# Patient Record
Sex: MALE | Race: WHITE | NOT HISPANIC OR LATINO | Employment: FULL TIME | URBAN - METROPOLITAN AREA
[De-identification: names, ages, dates, MRNs, and addresses within clinical notes are randomized per-mention and may not be internally consistent; named-entity substitution may affect disease eponyms.]

---

## 2020-08-19 ENCOUNTER — TELEPHONE (OUTPATIENT)
Dept: FAMILY MEDICINE CLINIC | Facility: CLINIC | Age: 55
End: 2020-08-19

## 2020-08-19 ENCOUNTER — OFFICE VISIT (OUTPATIENT)
Dept: FAMILY MEDICINE CLINIC | Facility: CLINIC | Age: 55
End: 2020-08-19

## 2020-08-19 VITALS
BODY MASS INDEX: 36.47 KG/M2 | TEMPERATURE: 97.3 F | OXYGEN SATURATION: 99 % | HEIGHT: 73 IN | DIASTOLIC BLOOD PRESSURE: 88 MMHG | HEART RATE: 67 BPM | WEIGHT: 275.2 LBS | SYSTOLIC BLOOD PRESSURE: 140 MMHG

## 2020-08-19 DIAGNOSIS — R73.01 IMPAIRED FASTING BLOOD SUGAR: ICD-10-CM

## 2020-08-19 DIAGNOSIS — R97.20 HIGH PROSTATE SPECIFIC ANTIGEN (PSA): ICD-10-CM

## 2020-08-19 DIAGNOSIS — Z12.11 ENCOUNTER FOR SCREENING COLONOSCOPY: ICD-10-CM

## 2020-08-19 DIAGNOSIS — E78.00 HIGH CHOLESTEROL: ICD-10-CM

## 2020-08-19 DIAGNOSIS — Z00.00 ANNUAL PHYSICAL EXAM: Primary | ICD-10-CM

## 2020-08-19 PROBLEM — J30.2 ALLERGIC RHINITIS, SEASONAL: Status: ACTIVE | Noted: 2020-08-19

## 2020-08-19 PROBLEM — E66.9 ADIPOSITY: Status: ACTIVE | Noted: 2020-08-19

## 2020-08-19 PROCEDURE — 99386 PREV VISIT NEW AGE 40-64: CPT | Performed by: FAMILY MEDICINE

## 2020-08-19 RX ORDER — LOSARTAN POTASSIUM AND HYDROCHLOROTHIAZIDE 12.5; 5 MG/1; MG/1
1 TABLET ORAL DAILY
COMMUNITY
End: 2021-01-25 | Stop reason: SDUPTHER

## 2020-08-19 NOTE — PROGRESS NOTES
Chief Complaint   Patient presents with   • Establish Care     needes referrals       Subjective     Wei Salgado is a 55 y.o. male and is here for a yearly physical exam. The patient reports no problems.    Do you take any herbs or supplements that were not prescribed by a doctor? no. If so, these will be added to active medication list.    55-year-old male here for annual exam and to establish care    Past medical history of hypertension.  He takes losartan unknown dose, he has been taking this medication for about a year.  He started with lisinopril but that gave him headaches.  He is former PCP is Dr. Alba Manriquez MD.      Labs from Jan 2019 last year:  -  -  -Fasting glucose 118 A1c 5.6  -LFTs normal  -Thyroid was normal   -Hgb normal   -PSA 0.7  -Low vit D  -Normal renal function    He was told to get started on cholesterol medication he declined.  He is a non-smoker.  His BMI is 36.  He is on medication for hypertension.    Health Maintenance:  No FH colon cancer.  Non-smoker.  No family history of any other cancers.  Has been told about sleep apnea in the past  Never had c-scope  He is on [facility] medicaid until end month/may get renewed we will see when we can schedule colonoscopy so that it is covered        Past Medical History:   Diagnosis Date   • Hyperlipidemia    • Hypertension    • Sleep apnea        Past Surgical History:   Procedure Laterality Date   • HAND SURGERY  1977       Family History   Problem Relation Age of Onset   • Arthritis Mother    • Mental illness Mother    • Stroke Father    • No Known Problems Sister    • No Known Problems Brother    • No Known Problems Brother    • No Known Problems Brother        Social History     Socioeconomic History   • Marital status:      Spouse name: Not on file   • Number of children: Not on file   • Years of education: Not on file   • Highest education level: Not on file   Tobacco Use   • Smoking status: Never Smoker   • Smokeless  "tobacco: Never Used   Substance and Sexual Activity   • Alcohol use: Defer   • Drug use: Defer       Current Outpatient Medications on File Prior to Visit   Medication Sig Dispense Refill   • LOSARTAN POTASSIUM PO Take  by mouth.       No current facility-administered medications on file prior to visit.        Review of Systems   Constitutional: Negative for activity change, chills and fever.   HENT: Negative for congestion, postnasal drip and rhinorrhea.    Eyes: Negative for blurred vision and pain.   Respiratory: Negative for cough, chest tightness and shortness of breath.    Cardiovascular: Negative for chest pain.   Gastrointestinal: Negative for abdominal pain, constipation, diarrhea, nausea and vomiting.   Endocrine: Negative for cold intolerance and heat intolerance.   Genitourinary: Negative for decreased urine volume, dysuria and frequency.   Musculoskeletal: Negative for arthralgias, back pain and myalgias.   Skin: Negative for rash and skin lesions.   Neurological: Negative for dizziness and confusion.   Psychiatric/Behavioral: Negative for agitation, behavioral problems and depressed mood.         Vitals:    08/19/20 0830   BP: 140/88   Pulse: 67   Temp: 97.3 °F (36.3 °C)   SpO2: 99%   Weight: 125 kg (275 lb 3.2 oz)   Height: 185.4 cm (73\")       General Appearance:  Alert, cooperative, no distress, appears stated age   Head:  Normocephalic, without obvious abnormality, atraumatic   Eyes:  PERRL, conjunctiva/corneas clear, EOM's intact.   Ears:  Normal external ear canals, both ears   Nose: Nares normal   Throat: Lips, mucosa, and tongue normal; teeth and gums normal   Neck: Supple, symmetrical, trachea midline   Back:  Symmetric   Lungs:  Clear to auscultation bilaterally, respirations unlabored   Chest wall:  No tenderness or deformity   Heart:  Regular rate and rhythm, S1 and S2 normal   Abdomen:  Soft, non-tender, bowel sounds active   Rectal:        Extremities: Extremities normal   Pulses: 2+ and " symmetric all extremities   Skin: Skin normal   Lymph nodes: Cervical nodes normal   Neurologic:  Normal strength, sensation and reflexes   throughout        No results found for this or any previous visit.  Assessment/Plan   Healthy male exam.    There are no diagnoses linked to this encounter.  1. Annual Exam     2. Patient Counseling:  --Nutrition: Stressed importance of moderation in sodium/caffeine intake, saturated fat and cholesterol.  Discussed caloric balance, sufficient intake of fresh fruits, vegetables, fiber, calcium, iron.  --Discussed the new recommendation against daily use of baby aspirin for primary prevention in low risk patients.  --Exercise: Stressed the importance of regular exercise.   --Substance Abuse: Discussed cessation/primary prevention of tobacco, alcohol, or other drug use; driving or other dangerous activities under the influence.    --Dental health: Discussed importance of regular tooth brushing, flossing, and dental visits.  -- suggested having eyes and vision checked if needed or past due.  --Immunizations reviewed.  --Discussed benefits of screening colonoscopy.    3. Discussed the patient's BMI with him.  The BMI is above average; BMI management plan is completed  4. Follow up as needed for acute illness       -Schedule for colonoscopy depending on his insurance coverage  -Make sure he comes back for fasting labs before the end of the month per insurance  -He is to call us and let us know what dosage of losartan he takes daily.  He does not know.  He takes it once a day in the mornings.  Blood pressure was borderline 140/80 today.  Asymptomatic      Shayna Messina MD  [facility]

## 2020-08-24 ENCOUNTER — RESULTS ENCOUNTER (OUTPATIENT)
Dept: FAMILY MEDICINE CLINIC | Facility: CLINIC | Age: 55
End: 2020-08-24

## 2020-08-24 DIAGNOSIS — R73.01 IMPAIRED FASTING BLOOD SUGAR: ICD-10-CM

## 2020-08-24 DIAGNOSIS — E78.00 HIGH CHOLESTEROL: ICD-10-CM

## 2020-08-24 DIAGNOSIS — R97.20 HIGH PROSTATE SPECIFIC ANTIGEN (PSA): ICD-10-CM

## 2020-08-24 DIAGNOSIS — Z00.00 ANNUAL PHYSICAL EXAM: ICD-10-CM

## 2020-09-14 ENCOUNTER — PREP FOR SURGERY (OUTPATIENT)
Dept: OTHER | Facility: HOSPITAL | Age: 55
End: 2020-09-14

## 2020-09-14 DIAGNOSIS — Z12.11 SCREEN FOR COLON CANCER: Primary | ICD-10-CM

## 2020-10-19 PROBLEM — Z12.11 SCREEN FOR COLON CANCER: Status: ACTIVE | Noted: 2020-10-19

## 2020-11-05 ENCOUNTER — TRANSCRIBE ORDERS (OUTPATIENT)
Dept: SLEEP MEDICINE | Facility: HOSPITAL | Age: 55
End: 2020-11-05

## 2020-11-05 DIAGNOSIS — Z01.818 OTHER SPECIFIED PRE-OPERATIVE EXAMINATION: Primary | ICD-10-CM

## 2020-11-18 ENCOUNTER — LAB (OUTPATIENT)
Dept: LAB | Facility: HOSPITAL | Age: 55
End: 2020-11-18

## 2020-11-18 DIAGNOSIS — Z01.818 OTHER SPECIFIED PRE-OPERATIVE EXAMINATION: ICD-10-CM

## 2020-11-18 PROCEDURE — C9803 HOPD COVID-19 SPEC COLLECT: HCPCS

## 2020-11-18 PROCEDURE — U0004 COV-19 TEST NON-CDC HGH THRU: HCPCS

## 2020-11-19 LAB — SARS-COV-2 RNA RESP QL NAA+PROBE: NOT DETECTED

## 2020-11-19 RX ORDER — FLUTICASONE PROPIONATE 50 MCG
1 SPRAY, SUSPENSION (ML) NASAL DAILY
COMMUNITY
End: 2021-01-25 | Stop reason: SDUPTHER

## 2020-11-20 ENCOUNTER — HOSPITAL ENCOUNTER (OUTPATIENT)
Facility: HOSPITAL | Age: 55
Setting detail: HOSPITAL OUTPATIENT SURGERY
Discharge: HOME | End: 2020-11-20
Attending: INTERNAL MEDICINE | Admitting: INTERNAL MEDICINE

## 2020-11-20 ENCOUNTER — ANESTHESIA (OUTPATIENT)
Dept: GASTROENTEROLOGY | Facility: HOSPITAL | Age: 55
End: 2020-11-20

## 2020-11-20 ENCOUNTER — ANESTHESIA EVENT (OUTPATIENT)
Dept: GASTROENTEROLOGY | Facility: HOSPITAL | Age: 55
End: 2020-11-20

## 2020-11-20 VITALS
DIASTOLIC BLOOD PRESSURE: 85 MMHG | TEMPERATURE: 98.4 F | HEART RATE: 65 BPM | SYSTOLIC BLOOD PRESSURE: 131 MMHG | OXYGEN SATURATION: 96 % | RESPIRATION RATE: 12 BRPM | BODY MASS INDEX: 36.18 KG/M2 | HEIGHT: 73 IN | WEIGHT: 273 LBS

## 2020-11-20 PROCEDURE — 25010000002 PROPOFOL 10 MG/ML EMULSION: Performed by: ANESTHESIOLOGY

## 2020-11-20 PROCEDURE — S0260 H&P FOR SURGERY: HCPCS | Performed by: INTERNAL MEDICINE

## 2020-11-20 PROCEDURE — G0121 COLON CA SCRN NOT HI RSK IND: HCPCS | Performed by: INTERNAL MEDICINE

## 2020-11-20 RX ORDER — LIDOCAINE HYDROCHLORIDE 20 MG/ML
INJECTION, SOLUTION INFILTRATION; PERINEURAL AS NEEDED
Status: DISCONTINUED | OUTPATIENT
Start: 2020-11-20 | End: 2020-11-20 | Stop reason: SURG

## 2020-11-20 RX ORDER — SODIUM CHLORIDE, SODIUM LACTATE, POTASSIUM CHLORIDE, CALCIUM CHLORIDE 600; 310; 30; 20 MG/100ML; MG/100ML; MG/100ML; MG/100ML
1000 INJECTION, SOLUTION INTRAVENOUS CONTINUOUS
Status: DISCONTINUED | OUTPATIENT
Start: 2020-11-20 | End: 2020-11-20 | Stop reason: HOSPADM

## 2020-11-20 RX ORDER — PROPOFOL 10 MG/ML
VIAL (ML) INTRAVENOUS AS NEEDED
Status: DISCONTINUED | OUTPATIENT
Start: 2020-11-20 | End: 2020-11-20 | Stop reason: SURG

## 2020-11-20 RX ORDER — PROPOFOL 10 MG/ML
VIAL (ML) INTRAVENOUS CONTINUOUS PRN
Status: DISCONTINUED | OUTPATIENT
Start: 2020-11-20 | End: 2020-11-20 | Stop reason: SURG

## 2020-11-20 RX ADMIN — SODIUM CHLORIDE, POTASSIUM CHLORIDE, SODIUM LACTATE AND CALCIUM CHLORIDE 1000 ML: 600; 310; 30; 20 INJECTION, SOLUTION INTRAVENOUS at 11:33

## 2020-11-20 RX ADMIN — LIDOCAINE HYDROCHLORIDE 60 MG: 20 INJECTION, SOLUTION INFILTRATION; PERINEURAL at 12:18

## 2020-11-20 RX ADMIN — PROPOFOL 140 MCG/KG/MIN: 10 INJECTION, EMULSION INTRAVENOUS at 12:18

## 2020-11-20 RX ADMIN — PROPOFOL 50 MG: 10 INJECTION, EMULSION INTRAVENOUS at 12:18

## 2020-11-20 NOTE — ANESTHESIA POSTPROCEDURE EVALUATION
"Patient: Wei Salgado    Procedure Summary     Date: 11/20/20 Room / Location:  [facility]    Anesthesia Start: 1215 Anesthesia Stop: 1248    Procedure: COLONOSCOPY INTO CECUM (N/A ) Diagnosis:       Screen for colon cancer      Internal hemorrhoids      (Screen for colon cancer [Z12.11])    Surgeon: Jesse Frey MD Provider: Toribio Horner MD    Anesthesia Type: MAC ASA Status: 3          Anesthesia Type: MAC    Vitals  Vitals Value Taken Time   /75 11/20/20 1249   Temp     Pulse 76 11/20/20 1249   Resp 12 11/20/20 1249   SpO2 95 % 11/20/20 1249           Post Anesthesia Care and Evaluation    Patient location during evaluation: bedside  Patient participation: complete - patient participated  Level of consciousness: awake  Pain score: 2  Pain management: adequate  Airway patency: patent  Anesthetic complications: No anesthetic complications  PONV Status: none  Cardiovascular status: acceptable  Respiratory status: acceptable  Hydration status: acceptable    Comments: /75   Pulse 76   Temp 36.9 °C (98.4 °F) (Oral)   Resp 12   Ht 185.4 cm (73\")   Wt 124 kg (273 lb)   SpO2 95%   BMI 36.02 kg/m²         "

## 2020-11-20 NOTE — BRIEF OP NOTE
COLONOSCOPY  Progress Note    Wei Salgado  11/20/2020    Pre-op Diagnosis:   Screen for colon cancer [Z12.11]       Post-Op Diagnosis Codes:     * Screen for colon cancer [Z12.11]     * Internal hemorrhoids [K64.8]    Procedure/CPT® Codes:        Procedure(s):  COLONOSCOPY INTO CECUM    Surgeon(s):  Jesse Frey MD    Anesthesia: Monitored Anesthesia Care    Staff:   Endo Technician: Rosalie Galeano Zyon  Endo Nurse: Elizabet Rojas RN         Estimated Blood Loss: none    Urine Voided: * No values recorded between 11/20/2020 12:15 PM and 11/20/2020 12:43 PM *    Specimens:                None          Drains: * No LDAs found *    Findings: Colonoscopy to the cecum and ileocecal valve with normal colonic mucosa throughout 1+ internal hemorrhoids were identified.    Complications: None          Jesse Frey MD     Date: 11/20/2020  Time: 12:44 EST

## 2020-11-20 NOTE — ANESTHESIA PREPROCEDURE EVALUATION
Anesthesia Evaluation     Patient summary reviewed and Nursing notes reviewed   NPO Solid Status: > 8 hours  NPO Liquid Status: > 2 hours           Airway   Mallampati: II  TM distance: >3 FB  Neck ROM: full  Dental - normal exam     Pulmonary - normal exam   (+) sleep apnea on CPAP,   Cardiovascular - normal exam    (+) hypertension 2 medications or greater, hyperlipidemia,       Neuro/Psych- negative ROS  GI/Hepatic/Renal/Endo    (+) obesity,       Musculoskeletal (-) negative ROS    Abdominal    Substance History - negative use     OB/GYN negative ob/gyn ROS         Other                        Anesthesia Plan    ASA 3     MAC       Anesthetic plan, all risks, benefits, and alternatives have been provided, discussed and informed consent has been obtained with: patient.

## 2020-11-20 NOTE — H&P
"[facility]  Pre Procedure History & Physical    Chief Complaint:   Colonoscopy screening    Subjective     HPI:   Patient 55-year-old male with history of hypertension hyperlipidemia presenting for screening.  Patient with no GI complaints here for colonoscopy.    Past Medical History:   Past Medical History:   Diagnosis Date   • Hyperlipidemia    • Hypertension    • Sleep apnea     USES CPAP        Past Surgical History:  Past Surgical History:   Procedure Laterality Date   • HAND SURGERY  1977   • WISDOM TOOTH EXTRACTION         Family History:  Family History   Problem Relation Age of Onset   • Arthritis Mother    • Mental illness Mother    • Stroke Father    • No Known Problems Sister    • No Known Problems Brother    • No Known Problems Brother    • No Known Problems Brother    • Malig Hyperthermia Neg Hx        Social History:   reports that he has never smoked. He has never used smokeless tobacco. He reports current alcohol use. He reports that he does not use drugs.    Medications:   Medications Prior to Admission   Medication Sig Dispense Refill Last Dose   • fluticasone (FLONASE) 50 MCG/ACT nasal spray 1 spray into the nostril(s) as directed by provider Daily.   11/19/2020 at Unknown time   • losartan-hydrochlorothiazide (HYZAAR) 50-12.5 MG per tablet Take 1 tablet by mouth Daily.   11/20/2020 at 0620       Allergies:  Patient has no known allergies.    ROS:    Pertinent items are noted in HPI     Objective     Blood pressure 141/90, pulse 70, temperature 98.4 °F (36.9 °C), temperature source Oral, resp. rate 13, height 185.4 cm (73\"), weight 124 kg (273 lb), SpO2 99 %.    Physical Exam   Constitutional: Pt is oriented to person, place, and time and well-developed, well-nourished, and in no distress.   Mouth/Throat: Oropharynx is clear and moist.   Neck: Normal range of motion.   Cardiovascular: Normal rate, regular rhythm and normal heart sounds.    Pulmonary/Chest: Effort normal " and breath sounds normal.   Abdominal: Soft. Nontender  Skin: Skin is warm and dry.   Psychiatric: Mood, memory, affect and judgment normal.     Assessment/Plan     Diagnosis:  Colonoscopy screening    Anticipated Surgical Procedure:  Colonoscopy    The risks, benefits, and alternatives of this procedure have been discussed with the patient or the responsible party- the patient understands and agrees to proceed.

## 2020-12-29 ENCOUNTER — TELEPHONE (OUTPATIENT)
Dept: FAMILY MEDICINE CLINIC | Facility: CLINIC | Age: 55
End: 2020-12-29

## 2020-12-29 NOTE — TELEPHONE ENCOUNTER
Provider: YOSVANY CONTRERAS  Caller: LOVE GARCIA   Relationship to Patient: SELF    Phone Number: [phone] (H)      Reason for Call: PATIENT CALLING STATING CALLED MEDICAID TO GET NEW CPAP MACHINE  MODEL (RESNED [id number]) WAS TOLD  NEEDS A PRESCRIPTION WRITTEN. PATIENT STATES JUST NEEDS HOES, HEADGEAR AND NASAL PILLOW        PATIENT IS REQUESTING A CALLBACK

## 2020-12-30 NOTE — TELEPHONE ENCOUNTER
Pt only needs the supplies, said his CPAP machine is fine. He only needs the hose, head strap and a new nasal pillow.

## 2020-12-31 NOTE — TELEPHONE ENCOUNTER
[facility] just supplies written on it, sorry thanks

## 2021-01-25 NOTE — TELEPHONE ENCOUNTER
Caller: Wei Salgado    Relationship: Self    Best call back number: [phone]       Medication needed:   Requested Prescriptions     Pending Prescriptions Disp Refills   • losartan-hydrochlorothiazide (HYZAAR) 50-12.5 MG per tablet        Sig: Take 1 tablet by mouth Daily.   • fluticasone (FLONASE) 50 MCG/ACT nasal spray        Si spray into the nostril(s) as directed by provider Daily.       When do you need the refill by: ASAP  What details did the patient provide when requesting the medication: PREVIOUS DR FILLED THESE, IS WANTING TO NOW SINCE HE ESTABLISHED WITH DR CONTRERAS IN  IF SHE CAN FILL THESE NOW    Does the patient have less than a 3 day supply:  [x] Yes  [] No    What is the patient's preferred pharmacy: [facility] [address] [place] [address] PKY - [phone] [facility] [phone] FX

## 2021-01-26 RX ORDER — FLUTICASONE PROPIONATE 50 MCG
1 SPRAY, SUSPENSION (ML) NASAL DAILY
Qty: 1 BOTTLE | Refills: 3 | Status: SHIPPED | OUTPATIENT
Start: 2021-01-26 | End: 2021-05-01

## 2021-01-26 RX ORDER — LOSARTAN POTASSIUM AND HYDROCHLOROTHIAZIDE 12.5; 5 MG/1; MG/1
1 TABLET ORAL DAILY
Qty: 30 TABLET | Refills: 3 | Status: SHIPPED | OUTPATIENT
Start: 2021-01-26 | End: 2021-05-01

## 2021-01-29 RX ORDER — LOSARTAN POTASSIUM 50 MG/1
50 TABLET ORAL DAILY
Qty: 30 TABLET | Refills: 1 | Status: SHIPPED | OUTPATIENT
Start: 2021-01-29 | End: 2021-03-30 | Stop reason: SDUPTHER

## 2021-01-29 RX ORDER — LOSARTAN POTASSIUM 50 MG/1
TABLET ORAL
COMMUNITY
Start: 2020-11-25 | End: 2021-01-29 | Stop reason: SDUPTHER

## 2021-01-29 NOTE — TELEPHONE ENCOUNTER
The message from the pt below states he only wants the losartan he does not need the HCTZ. I reconciled his medication and what is in his chart is what he was taking. The pending order is what he would like

## 2021-01-29 NOTE — TELEPHONE ENCOUNTER
Caller: Wei Salgado    Relationship: Self    Best call back number: [phone] (H)    Medication needed:   Requested Prescriptions      No prescriptions requested or ordered in this encounter   LOSARTAN 50MG 1 QD OR 25 2 QD    When do you need the refill by: ASAP    What details did the patient provide when requesting the medication: PATIENT IS REQUESTING THE LOSARTAN BY ITSELF, WAS PREVIOUSLY FILLED BY DR ANA APARICIO BUT HE HAS NOW SWITCHED TO DR CONTRERAS AND IS ASKING THAT SHE FILL IT WITHOUT THE HYDROCHLOROTHIAZIDE BECAUSE HE DOESN'T NEED THAT, PLEASE CALL PATIENT IF YOU HAVE QUESTIONS, PATIENT IS COMPLETELY OUT AND HAS BEEN FOR ALMOST A WEEK    Does the patient have less than a 3 day supply:  [x] Yes  [] No    What is the patient's preferred pharmacy: [facility] [address] [place] [address] PKY - [phone] [facility] [phone] FX

## 2021-01-29 NOTE — TELEPHONE ENCOUNTER
Can we check is he on this med or the combo losartan-hctz, can give refill but needs f/u at least q6mo

## 2021-02-01 ENCOUNTER — OFFICE VISIT (OUTPATIENT)
Dept: FAMILY MEDICINE CLINIC | Facility: CLINIC | Age: 56
End: 2021-02-01

## 2021-02-01 VITALS
WEIGHT: 265 LBS | HEART RATE: 76 BPM | BODY MASS INDEX: 35.12 KG/M2 | HEIGHT: 73 IN | SYSTOLIC BLOOD PRESSURE: 160 MMHG | OXYGEN SATURATION: 98 % | DIASTOLIC BLOOD PRESSURE: 90 MMHG

## 2021-02-01 DIAGNOSIS — J01.00 ACUTE MAXILLARY SINUSITIS, RECURRENCE NOT SPECIFIED: Primary | ICD-10-CM

## 2021-02-01 PROCEDURE — 99213 OFFICE O/P EST LOW 20 MIN: CPT | Performed by: PHYSICIAN ASSISTANT

## 2021-02-01 RX ORDER — AMOXICILLIN 500 MG/1
1000 CAPSULE ORAL 3 TIMES DAILY
Qty: 30 CAPSULE | Refills: 0 | Status: SHIPPED | OUTPATIENT
Start: 2021-02-01 | End: 2021-05-01

## 2021-02-01 RX ORDER — IBUPROFEN 200 MG
200 TABLET ORAL EVERY 6 HOURS PRN
COMMUNITY
End: 2021-05-01

## 2021-02-01 NOTE — PROGRESS NOTES
"Chief Complaint  Oral Pain (last dental visit 8/31/20)    Subjective          Wei Salgado presents to [facility] for   History of Present Illness    Gilberto is a 55 year old male who presents for fatigue and symptoms pain.  States he has had some teeth sensitivity.  Has had left ear pressure/pain off and on for the past few days.  Denied any fevers, chills, rhinorrhea, headache, or grinding of teeth.  The last saw his dentist 2020.  Has been taking over-the-counter ibuprofen which has helped.  Appetite and sleep have been normal.    Objective   Vital Signs:   /90   Pulse 76   Ht 185.4 cm (73\")   Wt 120 kg (265 lb)   SpO2 98%   BMI 34.96 kg/m²     Physical Exam  Vitals signs and nursing note reviewed.   Constitutional:       Appearance: Normal appearance. He is well-developed and well-groomed. He is obese.      Interventions: Face mask in place.   HENT:      Head: Normocephalic and atraumatic.      Jaw: There is normal jaw occlusion.      Right Ear: Hearing, tympanic membrane, ear canal and external ear normal.      Left Ear: Hearing, tympanic membrane, ear canal and external ear normal.      Nose:      Right Sinus: Maxillary sinus tenderness present.      Left Sinus: Maxillary sinus tenderness present.      Mouth/Throat:      Lips: Pink.      Mouth: Mucous membranes are moist.      Dentition: Normal dentition. No dental tenderness, dental caries or gum lesions.      Tongue: No lesions.      Palate: No mass.      Pharynx: Oropharynx is clear. Uvula midline.   Eyes:      General: Lids are normal.      Conjunctiva/sclera: Conjunctivae normal.   Neck:      Musculoskeletal: Neck supple.      Trachea: Trachea and phonation normal. No tracheal tenderness.   Cardiovascular:      Rate and Rhythm: Normal rate and regular rhythm.      Pulses: Normal pulses.      Heart sounds: Normal heart sounds, S1 normal and S2 normal. No murmur.   Pulmonary:      Effort: Pulmonary effort is normal. "      Breath sounds: Normal breath sounds and air entry.   Abdominal:      General: Bowel sounds are normal.      Palpations: Abdomen is soft. There is no hepatomegaly.      Tenderness: There is no abdominal tenderness.   Musculoskeletal:      Right lower leg: No edema.      Left lower leg: No edema.   Lymphadenopathy:      Cervical: No cervical adenopathy.      Right cervical: No superficial, deep or posterior cervical adenopathy.     Left cervical: No superficial, deep or posterior cervical adenopathy.   Skin:     General: Skin is warm and dry.      Capillary Refill: Capillary refill takes less than 2 seconds.   Neurological:      Mental Status: He is alert and oriented to person, place, and time.   Psychiatric:         Attention and Perception: Attention and perception normal.         Mood and Affect: Mood and affect normal.         Speech: Speech normal.         Behavior: Behavior normal. Behavior is cooperative.         Thought Content: Thought content normal.         Cognition and Memory: Cognition and memory normal.         Judgment: Judgment normal.     I was wearing a surgical mask and face shield during the entire office visit/encounter.     Result Review :                 Assessment and Plan    Problem List Items Addressed This Visit     None      Visit Diagnoses     Acute maxillary sinusitis, recurrence not specified    -  Primary    Relevant Medications    amoxicillin (AMOXIL) 500 MG capsule        Mr. Gilberto Salgado was seen in office today diagnosed with acute maxillary sinusitis.  I have sent amoxicillin antibiotic to pharmacy.  He will use as directed.  He will continue ibuprofen as directed.  If no improvement, will need to see his dentist.      Follow Up   No follow-ups on file.  Patient was given instructions and counseling regarding his condition or for health maintenance advice. Please see specific information pulled into the AVS if appropriate.       [facility]  [facility]  [address] [facility] [address]  Dept: [phone]  Dept Fax: [phone]  Loc: [phone]  Loc Fax: [phone]

## 2021-02-16 ENCOUNTER — OFFICE VISIT (OUTPATIENT)
Dept: FAMILY MEDICINE CLINIC | Facility: CLINIC | Age: 56
End: 2021-02-16

## 2021-02-16 ENCOUNTER — HOSPITAL ENCOUNTER (OUTPATIENT)
Dept: GENERAL RADIOLOGY | Facility: HOSPITAL | Age: 56
Discharge: HOME | End: 2021-02-16
Admitting: FAMILY MEDICINE

## 2021-02-16 VITALS
DIASTOLIC BLOOD PRESSURE: 80 MMHG | HEIGHT: 73 IN | WEIGHT: 289 LBS | TEMPERATURE: 97.7 F | BODY MASS INDEX: 38.3 KG/M2 | OXYGEN SATURATION: 98 % | HEART RATE: 86 BPM | RESPIRATION RATE: 18 BRPM | SYSTOLIC BLOOD PRESSURE: 130 MMHG

## 2021-02-16 DIAGNOSIS — Z00.00 ANNUAL PHYSICAL EXAM: ICD-10-CM

## 2021-02-16 DIAGNOSIS — G89.29 CHRONIC BILATERAL LOW BACK PAIN WITH BILATERAL SCIATICA: Primary | ICD-10-CM

## 2021-02-16 DIAGNOSIS — E78.2 MIXED HYPERLIPIDEMIA: ICD-10-CM

## 2021-02-16 DIAGNOSIS — R20.0 NUMBNESS AND TINGLING: ICD-10-CM

## 2021-02-16 DIAGNOSIS — G89.29 CHRONIC BILATERAL LOW BACK PAIN WITH BILATERAL SCIATICA: ICD-10-CM

## 2021-02-16 DIAGNOSIS — M54.41 CHRONIC BILATERAL LOW BACK PAIN WITH BILATERAL SCIATICA: ICD-10-CM

## 2021-02-16 DIAGNOSIS — M54.42 CHRONIC BILATERAL LOW BACK PAIN WITH BILATERAL SCIATICA: Primary | ICD-10-CM

## 2021-02-16 DIAGNOSIS — M54.41 CHRONIC BILATERAL LOW BACK PAIN WITH BILATERAL SCIATICA: Primary | ICD-10-CM

## 2021-02-16 DIAGNOSIS — M54.42 CHRONIC BILATERAL LOW BACK PAIN WITH BILATERAL SCIATICA: ICD-10-CM

## 2021-02-16 DIAGNOSIS — I10 ESSENTIAL HYPERTENSION: ICD-10-CM

## 2021-02-16 DIAGNOSIS — R20.2 NUMBNESS AND TINGLING: ICD-10-CM

## 2021-02-16 PROCEDURE — 99214 OFFICE O/P EST MOD 30 MIN: CPT | Performed by: FAMILY MEDICINE

## 2021-02-16 PROCEDURE — 72100 X-RAY EXAM L-S SPINE 2/3 VWS: CPT

## 2021-02-16 NOTE — PROGRESS NOTES
Chief Complaint   Patient presents with   • Back Pain   • Leg Pain     bilat       Subjective   Wei Salgado is a 55 y.o. male.     History of Present Illness   55-year-old male with borderline high cholesterol, stable hypertension, overweight here for lower back pain chronic    Has been having chronic low back pain for the past several years.  Describes as bilateral buttocks pain.  Worse when standing for long periods of time, bending over.  Also bilateral calf muscle pain.  This is improved with use of ibuprofen.  Will occasionally get sciatica on both sides.  Steroids helped temporarily.  Has never been to physical therapy or had any lumbar MRI of his back.  Also having some numbness of his right lower foot.    Hypertension: Stable on his losartan and hydrochlorothiazide medication but he is overdue for fasting blood work.    HLD: Due for fasting blood work.  He had a statin but then his cholesterol lowered via dietary supplements    He notes that he weighs more than he has ever before.  Has gained weight.  Thinks this is contributing to his lower back pain as well    The following portions of the patient's history were reviewed and updated as appropriate: allergies, current medications, past family history, past medical history, past social history, past surgical history and problem list.      Past Medical History:   Diagnosis Date   • Hyperlipidemia    • Hypertension    • Sleep apnea     USES CPAP        Past Surgical History:   Procedure Laterality Date   • COLONOSCOPY N/A 11/20/2020    Procedure: COLONOSCOPY INTO CECUM;  Surgeon: Jesse Frey MD;  Location: [facility] ENDOSCOPY;  Service: Gastroenterology;  Laterality: N/A;  PRE: SCREENING  POST: HEMORRHOIDS   • HAND SURGERY  1977   • WISDOM TOOTH EXTRACTION         Family History   Problem Relation Age of Onset   • Arthritis Mother    • Mental illness Mother    • Stroke Father    • No Known Problems Sister    • No Known Problems Brother    • No Known  Problems Brother    • No Known Problems Brother    • Malig Hyperthermia Neg Hx        Social History     Socioeconomic History   • Marital status:      Spouse name: Not on file   • Number of children: Not on file   • Years of education: Not on file   • Highest education level: Not on file   Tobacco Use   • Smoking status: Never Smoker   • Smokeless tobacco: Never Used   Substance and Sexual Activity   • Alcohol use: Yes     Comment: very seldom   • Drug use: Never       Current Outpatient Medications on File Prior to Visit   Medication Sig Dispense Refill   • amoxicillin (AMOXIL) 500 MG capsule Take 2 capsules by mouth 3 (Three) Times a Day. 30 capsule 0   • fluticasone (FLONASE) 50 MCG/ACT nasal spray 1 spray into the nostril(s) as directed by provider Daily. 1 bottle 3   • ibuprofen (ADVIL,MOTRIN) 200 MG tablet Take 200 mg by mouth Every 6 (Six) Hours As Needed for Mild Pain .     • losartan (COZAAR) 50 MG tablet Take 1 tablet by mouth Daily. 30 tablet 1   • losartan-hydrochlorothiazide (HYZAAR) 50-12.5 MG per tablet Take 1 tablet by mouth Daily. 30 tablet 3     No current facility-administered medications on file prior to visit.        Review of Systems   Constitutional: Negative for activity change, chills and fever.   HENT: Negative for congestion, postnasal drip and rhinorrhea.    Eyes: Negative for blurred vision and pain.   Respiratory: Negative for cough, chest tightness and shortness of breath.    Cardiovascular: Negative for chest pain.   Gastrointestinal: Negative for abdominal pain, constipation, diarrhea, nausea and vomiting.   Endocrine: Negative for cold intolerance and heat intolerance.   Genitourinary: Negative for decreased urine volume, dysuria and frequency.   Musculoskeletal: Negative for arthralgias, back pain and myalgias.   Skin: Negative for rash and skin lesions.   Neurological: Negative for dizziness and confusion.   Psychiatric/Behavioral: Negative for agitation, behavioral  problems and depressed mood.           Vitals:    02/16/21 1441   BP: 130/80   Pulse: 86   Resp: 18   Temp: 97.7 °F (36.5 °C)   SpO2: 98%      Objective   Physical Exam  Vitals signs and nursing note reviewed.   Constitutional:       Appearance: He is well-developed.   HENT:      Head: Normocephalic and atraumatic.   Eyes:      Conjunctiva/sclera: Conjunctivae normal.      Pupils: Pupils are equal, round, and reactive to light.   Neck:      Musculoskeletal: Neck supple.   Cardiovascular:      Rate and Rhythm: Normal rate and regular rhythm.      Heart sounds: Normal heart sounds. No murmur.   Pulmonary:      Effort: Pulmonary effort is normal. No respiratory distress.      Breath sounds: Normal breath sounds.   Abdominal:      General: Bowel sounds are normal.      Palpations: Abdomen is soft.   Musculoskeletal: Normal range of motion.   Skin:     General: Skin is warm and dry.   Neurological:      Mental Status: He is alert and oriented to person, place, and time.           Assessment/Plan   Problems Addressed this Visit        Health Encounters    Annual physical exam    Relevant Orders    Hemoglobin A1c    Lipid Panel    CBC & Differential    Comprehensive Metabolic Panel    Vitamin B12      Other Visit Diagnoses     Chronic bilateral low back pain with bilateral sciatica    -  Primary    Relevant Orders    XR Spine Lumbar 2 or 3 View (Completed)    Hemoglobin A1c    Lipid Panel    CBC & Differential    Comprehensive Metabolic Panel    Vitamin B12    Numbness and tingling        Relevant Orders    Hemoglobin A1c    Lipid Panel    CBC & Differential    Comprehensive Metabolic Panel    Vitamin B12      Diagnoses       Codes Comments    Chronic bilateral low back pain with bilateral sciatica    -  Primary ICD-10-CM: M54.42, M54.41, G89.29  ICD-9-CM: 724.2, 724.3, 338.29     Annual physical exam     ICD-10-CM: Z00.00  ICD-9-CM: V70.0     Numbness and tingling     ICD-10-CM: R20.0, R20.2  ICD-9-CM: 782.0            -Schedule for fasting lab work as he has not had this in over a year and he is on blood pressure medication.    -Need to check his blood sugars, vitamin B12, cholesterol levels    -Has been tolerating his losartan and hydrochlorothiazide well.  No further refills until he gets lab work completed.    -Discussed using Tylenol or ibuprofen and will start with x-ray of her back.  May need physical therapy and weight loss. SLR negative, no weakness on exam, good strength and sensation       Shayna Messina MD

## 2021-02-17 NOTE — PROGRESS NOTES
Please notify:    Xray does show some arthritis of lower back. Lets wait for lab results to return then can consider sending in course of steroids as well as physical therapy evaluation so they can do a full eval as well

## 2021-02-18 LAB
ALBUMIN SERPL-MCNC: 4.2 G/DL (ref 3.5–5.2)
ALBUMIN/GLOB SERPL: 1.6 G/DL
ALP SERPL-CCNC: 88 U/L (ref 39–117)
ALT SERPL-CCNC: 34 U/L (ref 1–41)
AST SERPL-CCNC: 36 U/L (ref 1–40)
BASOPHILS # BLD AUTO: 0.04 10*3/MM3 (ref 0–0.2)
BASOPHILS NFR BLD AUTO: 0.7 % (ref 0–1.5)
BILIRUB SERPL-MCNC: 0.5 MG/DL (ref 0–1.2)
BUN SERPL-MCNC: 16 MG/DL (ref 6–20)
BUN/CREAT SERPL: 17.2 (ref 7–25)
CALCIUM SERPL-MCNC: 9.5 MG/DL (ref 8.6–10.5)
CHLORIDE SERPL-SCNC: 102 MMOL/L (ref 98–107)
CHOLEST SERPL-MCNC: 251 MG/DL (ref 0–200)
CO2 SERPL-SCNC: 29.2 MMOL/L (ref 22–29)
CREAT SERPL-MCNC: 0.93 MG/DL (ref 0.76–1.27)
EOSINOPHIL # BLD AUTO: 0.14 10*3/MM3 (ref 0–0.4)
EOSINOPHIL NFR BLD AUTO: 2.4 % (ref 0.3–6.2)
ERYTHROCYTE [DISTWIDTH] IN BLOOD BY AUTOMATED COUNT: 13 % (ref 12.3–15.4)
GLOBULIN SER CALC-MCNC: 2.7 GM/DL
GLUCOSE SERPL-MCNC: 119 MG/DL (ref 65–99)
HBA1C MFR BLD: 5.4 % (ref 4.8–5.6)
HCT VFR BLD AUTO: 43.6 % (ref 37.5–51)
HDLC SERPL-MCNC: 43 MG/DL (ref 40–60)
HGB BLD-MCNC: 14.8 G/DL (ref 13–17.7)
IMM GRANULOCYTES # BLD AUTO: 0.03 10*3/MM3 (ref 0–0.05)
IMM GRANULOCYTES NFR BLD AUTO: 0.5 % (ref 0–0.5)
LDLC SERPL CALC-MCNC: 168 MG/DL (ref 0–100)
LYMPHOCYTES # BLD AUTO: 2.1 10*3/MM3 (ref 0.7–3.1)
LYMPHOCYTES NFR BLD AUTO: 36.1 % (ref 19.6–45.3)
MCH RBC QN AUTO: 30.9 PG (ref 26.6–33)
MCHC RBC AUTO-ENTMCNC: 33.9 G/DL (ref 31.5–35.7)
MCV RBC AUTO: 91 FL (ref 79–97)
MONOCYTES # BLD AUTO: 0.56 10*3/MM3 (ref 0.1–0.9)
MONOCYTES NFR BLD AUTO: 9.6 % (ref 5–12)
NEUTROPHILS # BLD AUTO: 2.95 10*3/MM3 (ref 1.7–7)
NEUTROPHILS NFR BLD AUTO: 50.7 % (ref 42.7–76)
NRBC BLD AUTO-RTO: 0.2 /100 WBC (ref 0–0.2)
PLATELET # BLD AUTO: 256 10*3/MM3 (ref 140–450)
POTASSIUM SERPL-SCNC: 4.3 MMOL/L (ref 3.5–5.2)
PROT SERPL-MCNC: 6.9 G/DL (ref 6–8.5)
PSA SERPL-MCNC: 0.97 NG/ML (ref 0–4)
RBC # BLD AUTO: 4.79 10*6/MM3 (ref 4.14–5.8)
SODIUM SERPL-SCNC: 142 MMOL/L (ref 136–145)
TRIGL SERPL-MCNC: 213 MG/DL (ref 0–150)
TSH SERPL DL<=0.005 MIU/L-ACNC: 1.47 UIU/ML (ref 0.27–4.2)
VLDLC SERPL CALC-MCNC: 40 MG/DL (ref 5–40)
WBC # BLD AUTO: 5.82 10*3/MM3 (ref 3.4–10.8)

## 2021-02-19 DIAGNOSIS — G89.29 CHRONIC BILATERAL LOW BACK PAIN WITH BILATERAL SCIATICA: Primary | ICD-10-CM

## 2021-02-19 DIAGNOSIS — M54.41 CHRONIC BILATERAL LOW BACK PAIN WITH BILATERAL SCIATICA: Primary | ICD-10-CM

## 2021-02-19 DIAGNOSIS — M54.42 CHRONIC BILATERAL LOW BACK PAIN WITH BILATERAL SCIATICA: Primary | ICD-10-CM

## 2021-02-19 RX ORDER — PREDNISONE 20 MG/1
40 TABLET ORAL DAILY
Qty: 10 TABLET | Refills: 0 | Status: SHIPPED | OUTPATIENT
Start: 2021-02-19 | End: 2021-02-24

## 2021-02-23 ENCOUNTER — TREATMENT (OUTPATIENT)
Dept: PHYSICAL THERAPY | Facility: CLINIC | Age: 56
End: 2021-02-23

## 2021-02-23 DIAGNOSIS — M54.50 LUMBAR SPINE PAINFUL ON MOVEMENT: ICD-10-CM

## 2021-02-23 DIAGNOSIS — R26.2 DIFFICULTY IN WALKING: ICD-10-CM

## 2021-02-23 DIAGNOSIS — S39.012A STRAIN OF LUMBAR REGION, INITIAL ENCOUNTER: Primary | ICD-10-CM

## 2021-02-23 PROCEDURE — 97162 PT EVAL MOD COMPLEX 30 MIN: CPT | Performed by: PHYSICAL THERAPIST

## 2021-02-23 NOTE — PROGRESS NOTES
Physical Therapy Initial Evaluation and Plan of Care    Patient: Wei Salgado   : 1965  Diagnosis/ICD-10 Code:  Strain of lumbar region, initial encounter [S39.012A]  Referring practitioner: Shayna Messina MD  Date of Initial Visit: 2021  Today's Date: 2021  Patient seen for 1 sessions           Subjective Questionnaire: Back index: 50      Subjective Evaluation    History of Present Illness  Mechanism of injury: Pt is a 55 year old WM w/ a 2-3 year history of lumbar spine pain. Pt states he first noticed something wrong 7-8 years ago when he sat on a metal bench at his sons baseball game and his anterior/inferior groin went numb. Pt states the last 2-3 years his back has really bothered him while standing and walking, but mostly standing. He states that in the last year or so he began getting bilateral posterior hip pain and bilateral posterior/lateral calf pain that has gotten worse in the last month and continues to get worse. Pt states that in order to relieve his symptoms he can lay down or put his feet up on a recliner/stool and to avoid the numbness in his groin he avoids sitting on hard surfaces. He recently was given a prednisone pack by his physician which has relieved his leg pain/back pain drastically. His physician also ran blood work which came back WNL, but Pt states he did not tell his physician about the numbness in his groin. He also states that he sometimes gets numbness in his right foot and that it feels like a sock is rolled up underneath his forefoot when he wears shoes but not barefoot. He states he sometimes gets pain in his back/bilateral calf/hips at the same times, but usually he feels it more in one spot but it will change without warning. He states that he has tried tylenol/aspirin which has helped some but not much. Pt denies the presence of bowel or bladder issues, history of trauma, fever, night sweats, chills, and states his pain has no effect on his  sleep.      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[employer]) Quality of life: fair (Pt only just recently got health insurance)    Pain  Current pain rating: 3  At worst pain ratin  Quality: lumbar (sharp/stiff), hip (like he got kicked - sore - leg feels heavy), Calves (soreness -like he just got done exercising (DOMS))  Relieving factors: rest (laying down, propping feet up (primary), shifting the left side (relieves back pain))  Aggravating factors: standing and ambulation  Progression: worsening    Diagnostic Tests  X-ray: abnormal    Patient Goals  Patient goals for therapy: decreased pain, increased motion, increased strength, independence with ADLs/IADLs and return to work  Patient goal: tolerance for standing/walking, losing weight           Objective          Palpation   Left   No palpable tenderness to the gluteus van, gluteus medius, piriformis and TFL.     Right   No palpable tenderness to the gluteus van, gluteus medius, piriformis and TFL.     Tenderness     Left Hip   No tenderness in the PSIS and greater trochanter.     Right Hip   No tenderness in the PSIS and greater trochanter.     Neurological Testing     Sensation     Lumbar   Left   Intact: light touch    Right   Intact: light touch    Comments   Right light touch: Pt states he has had sensory changes in his feet before but not present now    Reflexes   Left   Patellar (L4): normal (2+)  Achilles (S1): normal (2+)    Right   Patellar (L4): normal (2+)  Achilles (S1): normal (2+)    Active Range of Motion     Lumbar   Flexion: Active lumbar flexion: 100% + OP.   Extension: Active lumbar extension: 100% + OP; stiffness in lumbar spine. with pain  Left lateral flexion: Active left lumbar lateral flexion: 90%   Right lateral flexion: Active right lumbar lateral flexion: 75%; lumbar spine. with pain  Left rotation: Active left lumbar rotation: 85%   Right rotation: Active right lumbar rotation: 75% with pain  Left Hip   Flexion: Left hip active  flexion: 90%   External rotation (90/90): WFL  Internal rotation (90/90): WFL    Right Hip   Flexion: Right hip active flexion: 90%   External rotation (90/90): WFL  Internal rotation (90/90): WFL    Passive Range of Motion   Left Hip   Flexion: Left hip passive flexion: 75%   External rotation (90/90): Left hip passive external rotation 90/90: 100%   Internal rotation (90/90): Left hip passive internal rotation 90/90: 75%     Right Hip   Flexion: Right hip passive flexion: 75%   External rotation (90/90): Right hip passive external rotation 90/90: 100%   Internal rotation (90/90): Right hip passive internal rotation 90/90: 75%     Additional Passive Range of Motion Details  PAIVM:    CPA: Concordant for lumbar spine pain L3-L5  UPA (Lt and Rt): Concordant for lumbar spine pain L3-L5    Strength/Myotome Testing     Left Hip   Planes of Motion   Flexion: 5  External rotation: 4+  Internal rotation: 4+    Right Hip   Planes of Motion   Flexion: 5  External rotation: 4+  Internal rotation: 4+    Tests     Lumbar     Left   Negative crossed SLR, passive SLR and tibial bias.     Right   Negative crossed SLR, passive SLR and tibial bias.     Left Pelvic Girdle/Sacrum   Negative: sacrum compression, gapping, sacral spring and thigh thrust.     Right Pelvic Girdle/Sacrum   Negative: sacrum compression, gapping, sacral spring and thigh thrust.     Left Hip   Negative WINDY, FADIR and scour.     Right Hip   Negative WINDY, FADIR and scour.     Left Knee   Negative peroneal nerve tension.     Right Knee   Negative peroneal nerve tension.     Additional Tests Details  Prone on elbows: 1 min - stiffness in lower back no pain  Prone press up: increased lumbar spine pain          Assessment & Plan     Assessment  Impairments: abnormal or restricted ROM, activity intolerance, lacks appropriate home exercise program and weight-bearing intolerance  Assessment details: Pt presents with lumbar spine pain, restrictions in lumbar AROM,  pain with accessory testing of the lumbar spine, and difficulty with standing/ambulation. Pt presents with complaints of bilateral posterior hip/calf pain, numbness in his groin when sitting on hard surfaces, possible sensory changes in his feet, and complaints that his legs feel heavy/weak when standing for long periods. Pt's symptoms were less irritable on this date per subjective reports secondary to him being on Prednisone dose pack. All neurodynamic testing of the LE negative, normal neuro screen, and no peripherilization of symptoms into either LE with movements of the lumbar spine/accessory testing. Pt also states that he did not make his physican aware of the numbness/tingling in his groin when he went for his appointment. Suspect possible lumbar stenosis/neurogenic claudication. Plan is to see Pt 2x per week going forward to improve lumbar AROM, reduce sensitivity to accessory testing of the lumbar spine, and attempt to reproduce his LE pain when/if symptoms are more irritable.   Prognosis: fair  Prognosis details: LTG (0-4 weeks)    1. Pt will be compliant with HEP   2. Pt will reportable </= min tenderness during accessory movement testing of the lumbar spine.  3. 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 Pt will be independent with HEP  2. Pt will improve lumbar AROM to 100% in all planes with no complaints of pain.  3. Pt will report pain </=1-2/10 with prolonged standing.  4. Pt will report being able to sit on a hard surface without any numbness in his groin.  Functional Limitations: walking and standing  Plan  Therapy options: will be seen for skilled physical therapy services  Planned modality interventions: cryotherapy, dry needling, electrical stimulation/Russian stimulation and traction  Planned therapy interventions: manual therapy, ADL retraining, balance/weight-bearing training, body mechanics training, flexibility, functional ROM  exercises, home exercise program, IADL retraining, joint mobilization, therapeutic activities, stretching, strengthening, spinal/joint mobilization, soft tissue mobilization, neuromuscular re-education and motor coordination training  Frequency: 2x week  Duration in visits: 16  Plan details: See assessment        Manual Therapy:         mins  00543;  Therapeutic Exercise:         mins  03062;     Neuromuscular Arielle:        mins  28502;    Therapeutic Activity:          mins  89957;     Gait Training:           mins  88844;     Ultrasound:          mins  44515;    Electrical Stimulation:         mins  21662 ( );  Dry Needling          mins self-pay    Timed Treatment:   0   mins   Total Treatment:     55   mins    PT SIGNATURE: Coni Coles, PT; Ruy Fortune Student PT  DATE TREATMENT INITIATED: 2/23/2021    Initial Certification  Certification Period: 5/24/2021  I certify that the therapy services are furnished while this patient is under my care.  The services outlined above are required by this patient, and will be reviewed every 90 days.     PHYSICIAN: Shayna Messina MD      DATE:     Please sign and return via fax to [phone].. Thank you, [facility].

## 2021-02-24 ENCOUNTER — TELEPHONE (OUTPATIENT)
Dept: FAMILY MEDICINE CLINIC | Facility: CLINIC | Age: 56
End: 2021-02-24

## 2021-02-24 NOTE — TELEPHONE ENCOUNTER
Can be caused by nerve compression issues versus back v other. Would attend PT appointment and bring this up as well. We can consider imaging with MRI versus referral to an orthopedics/sports med doctor. I would start off with the physical therapy eval and then I will review report and we can go from there /can schedule a f/u phone visit with me or other after first PT appoint

## 2021-02-26 ENCOUNTER — TREATMENT (OUTPATIENT)
Dept: PHYSICAL THERAPY | Facility: CLINIC | Age: 56
End: 2021-02-26

## 2021-02-26 DIAGNOSIS — M54.50 LUMBAR SPINE PAINFUL ON MOVEMENT: ICD-10-CM

## 2021-02-26 DIAGNOSIS — R26.2 DIFFICULTY IN WALKING: ICD-10-CM

## 2021-02-26 DIAGNOSIS — S39.012A STRAIN OF LUMBAR REGION, INITIAL ENCOUNTER: Primary | ICD-10-CM

## 2021-02-26 PROCEDURE — 97110 THERAPEUTIC EXERCISES: CPT | Performed by: PHYSICAL THERAPIST

## 2021-02-26 PROCEDURE — 97530 THERAPEUTIC ACTIVITIES: CPT | Performed by: PHYSICAL THERAPIST

## 2021-02-26 PROCEDURE — 97012 MECHANICAL TRACTION THERAPY: CPT | Performed by: PHYSICAL THERAPIST

## 2021-02-26 NOTE — PROGRESS NOTES
Physical Therapy Daily Progress Note    Patient: Wei Salgado   : 1965  Diagnosis/ICD-10 Code:  Strain of lumbar region, initial encounter [S39.012A]  Referring practitioner: Shayna Messina MD  Date of Initial Visit: Type: THERAPY  Noted: 2021  Today's Date: 2021  Patient seen for 2 sessions         Wei Salgado reports: Pt states that his back pain is worse today and attributes it to the steroids wearing off. Pt states that his legs feel heavy/weak.    Subjective     Objective          Tests       Thoracic   Positive slump.     Lumbar     Left   Positive passive SLR.     Right   Positive passive SLR.       See Exercise, Manual, and Modality Logs for complete treatment.       Assessment & Plan     Assessment  Assessment details: Pt tolerated treatment well today. Pt had subjective decreases in heaviness in his legs/back pain post mechanical traction. Pt presents with increased irritibility in his lumbar spine/legs and positive neurodynamic testing bilaterally. Pt educated on anatomy, biomechanics, and prognosis related to possible lumbar stenosis/neurogenic claudication type symptoms he is presenting with. Pt progressed with self traction exercise in flexion to increase lumbar vertebral foramen/intervertebral foramen space. Pt progressed with flexion in sitting/PPT in supine/ in standing with anti-rotation to increase flexed position of the lumbar spine. Plan is to continue to see Pt 2x per week and monitor how lumbar flexion based exercise program/mechanical traction influences his back/leg pain in standing to improve tolerance for that activity and ability to perform his job with decreased pain/improved function.        Progress per Plan of Care           Manual Therapy:    5     mins  85533;  Therapeutic Exercise:     28    mins  34838;     Neuromuscular Arielle:        mins  47807;    Therapeutic Activity:      20    mins  22354;     Gait Training:           mins  36589;     Ultrasound:           mins  18080;    Electrical Stimulation:         mins  73597 ( );  Dry Needling          mins self-pay  Mechanical traction       10  mins 29375    Timed Treatment:   53   mins   Total Treatment:     63   mins    Ruy Fortune Student PT    Coni Coles, PT  Physical Therapist

## 2021-03-03 ENCOUNTER — TREATMENT (OUTPATIENT)
Dept: PHYSICAL THERAPY | Facility: CLINIC | Age: 56
End: 2021-03-03

## 2021-03-03 DIAGNOSIS — R26.2 DIFFICULTY IN WALKING: ICD-10-CM

## 2021-03-03 DIAGNOSIS — S39.012A STRAIN OF LUMBAR REGION, INITIAL ENCOUNTER: Primary | ICD-10-CM

## 2021-03-03 DIAGNOSIS — M54.50 LUMBAR SPINE PAINFUL ON MOVEMENT: ICD-10-CM

## 2021-03-03 PROCEDURE — 97012 MECHANICAL TRACTION THERAPY: CPT | Performed by: PHYSICAL THERAPIST

## 2021-03-03 PROCEDURE — 97110 THERAPEUTIC EXERCISES: CPT | Performed by: PHYSICAL THERAPIST

## 2021-03-03 NOTE — PROGRESS NOTES
Physical Therapy Daily Progress Note        Patient: Wei Salgado   : 1965  Diagnosis/ICD-10 Code:  No primary diagnosis found.  Referring practitioner: Shayna Messina MD  Date of Initial Visit: No linked episodes  Today's Date: 3/3/2021  Patient seen for Visit count could not be calculated. Make sure you are using a visit which is associated with an episode. sessions         Wei Salgado reports: he can tell his motion is getting better with the open books.  He has been able to do some of his exercises but not all of them because of his wife being in the hospital and recovering from hernia surgery.  He said he felt better after the traction last week.         Subjective     Objective   See Exercise, Manual, and Modality Logs for complete treatment.       Assessment/Plan  Reviewed initial exercises and HEP as pt reported he has been as compliant as he would like due to wife's surgery.  Education provided especially for anti-rotation exercises.  Gentle progression of strengthening this date with continued focus on PPT and abdominal stabilization with exercises.  Progressed time on traction due to relief in previous session and pt reported continued relief this date. Will continue to progress exercises and increase weight on traction next session if tolerated.  Will progress HEP as pt becomes more independent with current program.     Progress per Plan of Care           Manual Therapy:         mins  96389;  Therapeutic Exercise:    35     mins  89134;     Neuromuscular Arielle:        mins  93522;    Therapeutic Activity:          mins  24546;     Gait Training:           mins  23170;     Ultrasound:          mins  20168;    Electrical Stimulation:         mins  35865 ( );  Dry Needling          mins self-pay  Traction  15 mins    Timed Treatment:   35   mins   Total Treatment:     60   mins    Loraine Juárez PTA  Physical Therapist Assistant

## 2021-03-05 ENCOUNTER — TREATMENT (OUTPATIENT)
Dept: PHYSICAL THERAPY | Facility: CLINIC | Age: 56
End: 2021-03-05

## 2021-03-05 DIAGNOSIS — S39.012A STRAIN OF LUMBAR REGION, INITIAL ENCOUNTER: Primary | ICD-10-CM

## 2021-03-05 DIAGNOSIS — M54.50 LUMBAR SPINE PAINFUL ON MOVEMENT: ICD-10-CM

## 2021-03-05 DIAGNOSIS — R26.2 DIFFICULTY IN WALKING: ICD-10-CM

## 2021-03-05 PROCEDURE — 97110 THERAPEUTIC EXERCISES: CPT | Performed by: PHYSICAL THERAPIST

## 2021-03-05 PROCEDURE — 97012 MECHANICAL TRACTION THERAPY: CPT | Performed by: PHYSICAL THERAPIST

## 2021-03-05 PROCEDURE — 97530 THERAPEUTIC ACTIVITIES: CPT | Performed by: PHYSICAL THERAPIST

## 2021-03-05 NOTE — PROGRESS NOTES
Physical Therapy Daily Progress Note    Patient: Wei Salgado   : 1965  Diagnosis/ICD-10 Code:  Strain of lumbar region, initial encounter [S39.012A]  Referring practitioner: Shayna Messina MD  Date of Initial Visit: Type: THERAPY  Noted: 2021  Today's Date: 3/5/2021  Patient seen for 4 sessions         Wei Salgado reports: Pt states that his back continues to feel better, but he has noticed less change with his hip/leg pain. He states it has improved some since starting therapy.    Subjective     Objective   See Exercise, Manual, and Modality Logs for complete treatment.       Assessment & Plan     Assessment  Assessment details: Pt tolerated treatment well on this date. Pt continues to have decrease levels of subjective reports of lumbar spine pain at beginning of last two sessions and post exercise/mechanical traction. Bilateral posterior hip pain/posterior calf pain continues to increase in standing, but has decreased since eval and Pt states seated flexion has decreased his leg pain after standing for longer periods at work and he is taking more sitting breaks. Pt progressed with STS with PPT to improve Pt's ability to perform PPT during functional tasks for carryover into weightbearing to increase vertebral/intervertebral foramen widening for decreases in possible lumbar stenosis symptoms. Plan is to continue to see Pt 2x per week for manual therapy, modalities, and exercise PRN.        Progress per Plan of Care           Manual Therapy:    8     mins  90296;  Therapeutic Exercise:    23     mins  06152;     Neuromuscular Arielle:        mins  21484;    Therapeutic Activity:      9    mins  39896;     Gait Training:           mins  99138;     Ultrasound:          mins  11122;    Electrical Stimulation:         mins  73642 ( );  Dry Needling          mins self-pay  Mechanical traction       15  mins 41525    Timed Treatment:   40   mins   Total Treatment:     65   mins    Ruy Fortune  Student PT    Coni Coles, PT  Physical Therapist

## 2021-03-09 ENCOUNTER — TREATMENT (OUTPATIENT)
Dept: PHYSICAL THERAPY | Facility: CLINIC | Age: 56
End: 2021-03-09

## 2021-03-09 DIAGNOSIS — R26.2 DIFFICULTY IN WALKING: ICD-10-CM

## 2021-03-09 DIAGNOSIS — M54.50 LUMBAR SPINE PAINFUL ON MOVEMENT: ICD-10-CM

## 2021-03-09 DIAGNOSIS — S39.012A STRAIN OF LUMBAR REGION, INITIAL ENCOUNTER: Primary | ICD-10-CM

## 2021-03-09 PROCEDURE — 97110 THERAPEUTIC EXERCISES: CPT | Performed by: PHYSICAL THERAPIST

## 2021-03-09 PROCEDURE — 97012 MECHANICAL TRACTION THERAPY: CPT | Performed by: PHYSICAL THERAPIST

## 2021-03-09 NOTE — PROGRESS NOTES
Physical Therapy Daily Progress Note        Patient: Wei Salgado   : 1965  Diagnosis/ICD-10 Code:  Strain of lumbar region, initial encounter [S39.012A]  Referring practitioner: Shayna Messina MD  Date of Initial Visit: Type: THERAPY  Noted: 2021  Today's Date: 3/9/2021  Patient seen for 5 sessions         Wei Salgado reports: the back has been a little better.  He said it isn't as bad as it was and he is trying to cut down on ibuprofen.         Subjective     Objective   See Exercise, Manual, and Modality Logs for complete treatment.       Assessment/Plan  Continued to review exercises and focus on abdominal stabilization/PPT as pt continued to demonstrate some difficulty activating without significant knee flexion.  Progressed weight with traction and pt reported continued relief from use.  Will continue to progress core strengthening as tolerated as well as weight on traction for continued relief of symptoms.     Progress per Plan of Care           Manual Therapy:         mins  09866;  Therapeutic Exercise:    30     mins  27690;     Neuromuscular Arielle:        mins  68356;    Therapeutic Activity:          mins  08443;     Gait Training:           mins  64489;     Ultrasound:          mins  67539;    Electrical Stimulation:         mins  32593 ( );  Dry Needling          mins self-pay  Traction  15 mins     Timed Treatment:   30   mins   Total Treatment:     50   mins    Loraine Juárez PTA  Physical Therapist Assistant

## 2021-03-12 ENCOUNTER — TREATMENT (OUTPATIENT)
Dept: PHYSICAL THERAPY | Facility: CLINIC | Age: 56
End: 2021-03-12

## 2021-03-12 DIAGNOSIS — R26.2 DIFFICULTY IN WALKING: ICD-10-CM

## 2021-03-12 DIAGNOSIS — S39.012A STRAIN OF LUMBAR REGION, INITIAL ENCOUNTER: Primary | ICD-10-CM

## 2021-03-12 DIAGNOSIS — M54.50 LUMBAR SPINE PAINFUL ON MOVEMENT: ICD-10-CM

## 2021-03-12 PROCEDURE — 97012 MECHANICAL TRACTION THERAPY: CPT | Performed by: PHYSICAL THERAPIST

## 2021-03-12 PROCEDURE — 97530 THERAPEUTIC ACTIVITIES: CPT | Performed by: PHYSICAL THERAPIST

## 2021-03-12 PROCEDURE — 97110 THERAPEUTIC EXERCISES: CPT | Performed by: PHYSICAL THERAPIST

## 2021-03-12 NOTE — PROGRESS NOTES
Physical Therapy Daily Progress Note    Patient: Wei Salgado   : 1965  Diagnosis/ICD-10 Code:  Strain of lumbar region, initial encounter [S39.012A]  Referring practitioner: Shayna Messina MD  Date of Initial Visit: Type: THERAPY  Noted: 2021  Today's Date: 3/12/2021  Patient seen for 6 sessions         Wei Salgado reports: Pt states that he continues to notice increased mobility in his lower back, but states he is still only noticing small changes in his leg pain/sensation of heaviness in his legs.     Subjective     Objective   See Exercise, Manual, and Modality Logs for complete treatment.       Assessment & Plan     Assessment  Assessment details: Pt tolerated treatment well today. Pt continued to tolerate lumbar gapping techniques on the Rt with subjective reports of increased mobility post manual therapy techniques. Pt able to perform all exercises in standing without leg pain/sensation of heaviness in his legs. Pt continues to report decreases in lumbar spine pain/leg pain post mechanical traction. Pt progress with cable column side stepping exercise with PPT to improve Pt's ability to maintain PPT/lumbar flexion in standing for carryover to ambulation to improve vertebral foramen/intervertebral foramen opening for reduced possible lumbar stenosis symptoms. Plan is to continue to see Pt 2x per week for continued imporvements with pain free lumbar AROM, improve hip mobility/strength, and reduce complaints of bilateral leg pain/heaviness with standing.        Progress per Plan of Care           Manual Therapy:    8     mins  60512;  Therapeutic Exercise:     23    mins  31057;     Neuromuscular Arielle:        mins  44323;    Therapeutic Activity:     17     mins  39211;     Gait Training:           mins  08410;     Ultrasound:          mins  94350;    Electrical Stimulation:         mins  06853 ( );  Dry Needling          mins self-pay  Mechanical traction       15  mins  45418    Timed Treatment:   48   mins   Total Treatment:     63   mins    Ruy Fortune Student PT    Coni Coles, PT  Physical Therapist

## 2021-03-16 ENCOUNTER — TREATMENT (OUTPATIENT)
Dept: PHYSICAL THERAPY | Facility: CLINIC | Age: 56
End: 2021-03-16

## 2021-03-16 DIAGNOSIS — R26.2 DIFFICULTY IN WALKING: ICD-10-CM

## 2021-03-16 DIAGNOSIS — M54.50 LUMBAR SPINE PAINFUL ON MOVEMENT: ICD-10-CM

## 2021-03-16 DIAGNOSIS — S39.012A STRAIN OF LUMBAR REGION, INITIAL ENCOUNTER: Primary | ICD-10-CM

## 2021-03-16 PROCEDURE — 97110 THERAPEUTIC EXERCISES: CPT | Performed by: PHYSICAL THERAPIST

## 2021-03-16 PROCEDURE — 97012 MECHANICAL TRACTION THERAPY: CPT | Performed by: PHYSICAL THERAPIST

## 2021-03-16 NOTE — PROGRESS NOTES
Physical Therapy Daily Progress Note        Patient: Wei Salgado   : 1965  Diagnosis/ICD-10 Code:  Strain of lumbar region, initial encounter [S39.012A]  Referring practitioner: Shayna Messina MD  Date of Initial Visit: Type: THERAPY  Noted: 2021  Today's Date: 3/16/2021  Patient seen for 7 sessions         Wei Salgado reports: the back has been better.  He said he has been sitting more the last few days at work.  He said he isn't taking as much OTC pain medicine but it is still there.         Subjective     Objective   See Exercise, Manual, and Modality Logs for complete treatment.       Assessment/Plan  Pt tolerated core strengthening without complaints including addition of dead bugs.  Held cable column exercises due to time constraints.  Increased traction weight this date with pt reporting increased pull however no pain.  Will monitor tolerance to progression of weight on traction and continue to progress towards goals and decreased pain especially with standing.     Progress per Plan of Care           Manual Therapy:         mins  62843;  Therapeutic Exercise:    33     mins  60019;     Neuromuscular Arielle:        mins  38277;    Therapeutic Activity:          mins  96030;     Gait Training:           mins  21570;     Ultrasound:          mins  93266;    Electrical Stimulation:         mins  27437 ( );  Dry Needling          mins self-pay  Traction  15 mins     Timed Treatment:   33   mins   Total Treatment:     48   mins    Loraine Juárez PTA  Physical Therapist Assistant

## 2021-03-18 ENCOUNTER — TREATMENT (OUTPATIENT)
Dept: PHYSICAL THERAPY | Facility: CLINIC | Age: 56
End: 2021-03-18

## 2021-03-18 DIAGNOSIS — S39.012A STRAIN OF LUMBAR REGION, INITIAL ENCOUNTER: Primary | ICD-10-CM

## 2021-03-18 DIAGNOSIS — R26.2 DIFFICULTY IN WALKING: ICD-10-CM

## 2021-03-18 DIAGNOSIS — M54.50 LUMBAR SPINE PAINFUL ON MOVEMENT: ICD-10-CM

## 2021-03-18 PROCEDURE — 97530 THERAPEUTIC ACTIVITIES: CPT | Performed by: PHYSICAL THERAPIST

## 2021-03-18 PROCEDURE — 97012 MECHANICAL TRACTION THERAPY: CPT | Performed by: PHYSICAL THERAPIST

## 2021-03-18 PROCEDURE — 97110 THERAPEUTIC EXERCISES: CPT | Performed by: PHYSICAL THERAPIST

## 2021-03-19 NOTE — PROGRESS NOTES
Physical Therapy Daily Progress Note    Patient: Wei Salgado   : 1965  Diagnosis/ICD-10 Code:  Strain of lumbar region, initial encounter [S39.012A]  Referring practitioner: Shayna Messina MD  Date of Initial Visit: Type: THERAPY  Noted: 2021  Today's Date: 3/19/2021  Patient seen for 8 sessions         Wei Salgado reports: Pt states that he had been feeling much better on Monday and Tuesday, but had a really rough day yesterday when he had to work while on his back for long periods of time. States he felt his leg pain on both sides and that they felt heavy and noticed that his walking was different.     Subjective     Objective   See Exercise, Manual, and Modality Logs for complete treatment.       Assessment & Plan     Assessment  Assessment details: Pt tolerated treatment well today. Pt continues to have subjective reports of decreased pain/increased mobility in his lumbar spine/legs post treatment. Pt regressed to dead bug exercise with ball for improved sequencing with the exercise and increased ability to maintain PPT during the exercise due to Pt hyperextending his spine without the ball. Plan is to continue to see Pt 2x per week to continue to decrease complaints of weakness/heaviness in BLE and increase pain free AROM of the lumbar spine.        Progress per Plan of Care           Manual Therapy:    8     mins  76237;  Therapeutic Exercise:     24  mins  35269;     Neuromuscular Arielle:        mins  24716;    Therapeutic Activity:     23    mins  14466;     Gait Training:           mins  32563;     Ultrasound:          mins  90682;    Electrical Stimulation:         mins  47854 ( );  Dry Needling          mins self-pay  Traction  15 min   02244    Timed Treatment:   55   mins   Total Treatment:     84   mins    Ruy Fortune Student PT    Coni Coles, PT  Physical Therapist

## 2021-03-23 ENCOUNTER — TREATMENT (OUTPATIENT)
Dept: PHYSICAL THERAPY | Facility: CLINIC | Age: 56
End: 2021-03-23

## 2021-03-23 DIAGNOSIS — M54.50 LUMBAR SPINE PAINFUL ON MOVEMENT: ICD-10-CM

## 2021-03-23 DIAGNOSIS — S39.012A STRAIN OF LUMBAR REGION, INITIAL ENCOUNTER: Primary | ICD-10-CM

## 2021-03-23 DIAGNOSIS — R26.2 DIFFICULTY IN WALKING: ICD-10-CM

## 2021-03-23 PROCEDURE — 97012 MECHANICAL TRACTION THERAPY: CPT | Performed by: PHYSICAL THERAPIST

## 2021-03-23 PROCEDURE — 97530 THERAPEUTIC ACTIVITIES: CPT | Performed by: PHYSICAL THERAPIST

## 2021-03-23 PROCEDURE — 97110 THERAPEUTIC EXERCISES: CPT | Performed by: PHYSICAL THERAPIST

## 2021-03-23 NOTE — PROGRESS NOTES
Physical Therapy Daily Progress Note    Patient: Wei Salgado   : 1965  Diagnosis/ICD-10 Code:  Strain of lumbar region, initial encounter [S39.012A]  Referring practitioner: Shayna Messina MD  Date of Initial Visit: Type: THERAPY  Noted: 2021  Today's Date: 3/23/2021  Patient seen for 9 sessions         Wei Salgado reports: Pt states his leg pain/sensation of heaviness in his legs after standing for 10 minutes is 4/10 and 3/10 after 1 min. He states he feels he has progressed well in therapy up to this point, but thinks he has a lot of progress left to be made.    Subjective Questionnaire: Back index: 28%    Subjective     Objective          Active Range of Motion     Lumbar   Flexion: Active lumbar flexion: 100%   Extension: Active lumbar extension: 75% with pain  Left lateral flexion: Active left lumbar lateral flexion: 75% with pain  Right lateral flexion: Active right lumbar lateral flexion: 75% with pain  Left rotation: Active left lumbar rotation: 75%   Right rotation: Active right lumbar rotation: 75%       See Exercise, Manual, and Modality Logs for complete treatment.        Assessment & Plan     Assessment  Assessment details: Pt has progressed in PT. Pt has met 2/3 STG's and 1/4 LTG's. Pt with subjective decreases in reported leg pain/sensation of heaviness after prolonged bouts of standing/walking. Pt tolerated treatment well today. Pt progressed with lat pull down in standing to improve his ability to perform PPT/resist lumbar extension in standing to increase vertebral foramen widening/decrease possible stenosis of lumbar spine. Plan is to continue to see Pt 2x per week and continue to progress lumbar mobility/PPT strengthening/endurance exercises for further improvements with lumbar AROM, decreased complaints of leg heaviness/pain in standing, and improved ability to perform his normal work duties with decreased pain/increased function.        Progress per Plan of  Care    Prognosis details: LTG (0-4 weeks)    1. Pt will be compliant with HEP MET  2. Pt will reportable </= min tenderness during accessory movement testing of the lumbar spine. NOT TESTED  3. 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 Pt will be independent with HEP MET  2. Pt will improve lumbar AROM to 100% in all planes with no complaints of pain. PROGRESSING  3. Pt will report pain </=1-2/10 with prolonged standing. PROGRESSING  4. Pt will report being able to sit on a hard surface without any numbness in his groin. PROGRESSING       Manual Therapy:    8     mins  70230;  Therapeutic Exercise:     23    mins  41392;     Neuromuscular Arielle:        mins  15870;    Therapeutic Activity:     11     mins  03436;     Gait Training:           mins  51492;     Ultrasound:          mins  05054;    Electrical Stimulation:         mins  28538 ( );  Dry Needling          mins self-pay  Mechanical traction  15    mins 61535    Timed Treatment:   42   mins   Total Treatment:     67   mins    Ruy Fortune Student PT     Coni Coles, PT  Physical Therapist

## 2021-03-25 ENCOUNTER — TREATMENT (OUTPATIENT)
Dept: PHYSICAL THERAPY | Facility: CLINIC | Age: 56
End: 2021-03-25

## 2021-03-25 DIAGNOSIS — S39.012A STRAIN OF LUMBAR REGION, INITIAL ENCOUNTER: Primary | ICD-10-CM

## 2021-03-25 DIAGNOSIS — M54.50 LUMBAR SPINE PAINFUL ON MOVEMENT: ICD-10-CM

## 2021-03-25 DIAGNOSIS — R26.2 DIFFICULTY IN WALKING: ICD-10-CM

## 2021-03-25 PROCEDURE — 97530 THERAPEUTIC ACTIVITIES: CPT | Performed by: PHYSICAL THERAPIST

## 2021-03-25 PROCEDURE — 97012 MECHANICAL TRACTION THERAPY: CPT | Performed by: PHYSICAL THERAPIST

## 2021-03-25 PROCEDURE — 97110 THERAPEUTIC EXERCISES: CPT | Performed by: PHYSICAL THERAPIST

## 2021-03-25 NOTE — PROGRESS NOTES
Physical Therapy Daily Progress Note    Patient: Wei Salgado   : 1965  Diagnosis/ICD-10 Code:  Strain of lumbar region, initial encounter [S39.012A]  Referring practitioner: Shayna Messina MD  Date of Initial Visit: Type: THERAPY  Noted: 2021  Today's Date: 3/25/2021  Patient seen for 10 sessions         Wei Salgado reports: Pt states he was working a lot while on his back and he was able to tolerate it better than he did last week.    Subjective     Objective   See Exercise, Manual, and Modality Logs for complete treatment.       Assessment & Plan     Assessment  Assessment details: Pt tolerated treatment well today. Pt progressed with decline plank in standing with PPT with bilateral UE support on table. Pt required cueing to initiate neutral spine position before initiating pelvic tilt. Pt also progressed with step up with SLS. Pt required increased UE support to maintain SLS. Pt with subjective improvements with his ability to tolerate work while lying on his back compared to last week. Plan is to continue to see Pt 2x per week for manual therapy, exercise, and modalities PRN.        Progress per Plan of Care           Manual Therapy:    8     mins  39914;  Therapeutic Exercise:    23     mins  19279;     Neuromuscular Arielle:        mins  06590;    Therapeutic Activity:      8    mins  43850;     Gait Training:           mins  83974;     Ultrasound:          mins  63272;    Electrical Stimulation:         mins  89325 ( );  Dry Needling          mins self-pay  Mechanical traction     15 mins   00810    Timed Treatment:   39   mins   Total Treatment:     70   mins    Ruy Fortune Student PT    Coni Coles, PT  Physical Therapist

## 2021-03-30 ENCOUNTER — TREATMENT (OUTPATIENT)
Dept: PHYSICAL THERAPY | Facility: CLINIC | Age: 56
End: 2021-03-30

## 2021-03-30 ENCOUNTER — TELEPHONE (OUTPATIENT)
Dept: FAMILY MEDICINE CLINIC | Facility: CLINIC | Age: 56
End: 2021-03-30

## 2021-03-30 DIAGNOSIS — R26.2 DIFFICULTY IN WALKING: ICD-10-CM

## 2021-03-30 DIAGNOSIS — I10 ESSENTIAL HYPERTENSION: Primary | ICD-10-CM

## 2021-03-30 DIAGNOSIS — M54.50 LUMBAR SPINE PAINFUL ON MOVEMENT: ICD-10-CM

## 2021-03-30 DIAGNOSIS — S39.012A STRAIN OF LUMBAR REGION, INITIAL ENCOUNTER: Primary | ICD-10-CM

## 2021-03-30 PROCEDURE — 97110 THERAPEUTIC EXERCISES: CPT | Performed by: PHYSICAL THERAPIST

## 2021-03-30 PROCEDURE — 97530 THERAPEUTIC ACTIVITIES: CPT | Performed by: PHYSICAL THERAPIST

## 2021-03-30 PROCEDURE — 97012 MECHANICAL TRACTION THERAPY: CPT | Performed by: PHYSICAL THERAPIST

## 2021-03-30 RX ORDER — LOSARTAN POTASSIUM 50 MG/1
50 TABLET ORAL DAILY
Qty: 90 TABLET | Refills: 1 | Status: SHIPPED | OUTPATIENT
Start: 2021-03-30 | End: 2021-05-01

## 2021-03-30 RX ORDER — LOSARTAN POTASSIUM AND HYDROCHLOROTHIAZIDE 12.5; 1 MG/1; MG/1
1 TABLET ORAL DAILY
Qty: 90 TABLET | Refills: 1 | Status: SHIPPED | OUTPATIENT
Start: 2021-03-30 | End: 2021-09-22

## 2021-03-30 NOTE — TELEPHONE ENCOUNTER
PATIENT CALLED AND PHARMACY ADVISED HIM INSTEAD OF TAKING   losartan (COZAAR) 50 MG tablet  AND   losartan-hydrochlorothiazide (HYZAAR) 50-12.5 MG per tablet    HE CAN GET A SCRIPT OF:  osartan-hydrochlorothiazide (HYZAAR) 100 MG     THEY HAVE THE PREVIOUS REQUESTS ON HOLD UNTIL THEY HEAR FROM OFFICE ON WHAT TO DO. PLEASE ADVISE [phone]. HE SAYS HE IS OKAY TO TAKE ONLY ONE OR BOTH IF PCP ADVISES.    [id number] - [place] [address] - [phone]  - [phone]   [phone]

## 2021-03-30 NOTE — PROGRESS NOTES
Physical Therapy Daily Progress Note        Patient: Wei Salgado   : 1965  Diagnosis/ICD-10 Code:  Strain of lumbar region, initial encounter [S39.012A]  Referring practitioner: Shayna Messina MD  Date of Initial Visit: Type: THERAPY  Noted: 2021  Today's Date: 3/30/2021  Patient seen for 11 sessions         Wei Salgado reports: his back isn't doing too good today.  He rated it a 0/10 at rest but a 6/10 with moving around.  When he takes Aspirin and ibuprofen it helps.  He said his back was bothering him last night so he had to cut his exercises short.           Subjective     Objective   See Exercise, Manual, and Modality Logs for complete treatment.       Assessment/Plan  Due to reports of increased pain, maintained exercises however progressed weight on traction.  Closely monitored tolerance to exercises however no modifications required d/t pain and minimal cues required for technique.  Pt reported feeling good following traction.  Will continue to monitor symptoms and progress as tolerated.     Progress per Plan of Care           Manual Therapy:         mins  00652;  Therapeutic Exercise:    30     mins  17422;     Neuromuscular Arielle:        mins  17537;    Therapeutic Activity:     15     mins  20137;     Gait Training:           mins  92356;     Ultrasound:          mins  89539;    Electrical Stimulation:         mins  70270 ( );  Dry Needling          mins self-pay  Traction  15 mins     Timed Treatment:   45   mins   Total Treatment:     70   mins    Loraine Juárez PTA  Physical Therapist Assistant

## 2021-04-02 ENCOUNTER — TREATMENT (OUTPATIENT)
Dept: PHYSICAL THERAPY | Facility: CLINIC | Age: 56
End: 2021-04-02

## 2021-04-02 DIAGNOSIS — M54.50 LUMBAR SPINE PAINFUL ON MOVEMENT: ICD-10-CM

## 2021-04-02 DIAGNOSIS — R26.2 DIFFICULTY IN WALKING: ICD-10-CM

## 2021-04-02 DIAGNOSIS — S39.012A STRAIN OF LUMBAR REGION, INITIAL ENCOUNTER: Primary | ICD-10-CM

## 2021-04-02 PROCEDURE — 97110 THERAPEUTIC EXERCISES: CPT | Performed by: PHYSICAL THERAPIST

## 2021-04-02 PROCEDURE — 97012 MECHANICAL TRACTION THERAPY: CPT | Performed by: PHYSICAL THERAPIST

## 2021-04-02 PROCEDURE — 97140 MANUAL THERAPY 1/> REGIONS: CPT | Performed by: PHYSICAL THERAPIST

## 2021-04-02 PROCEDURE — 97530 THERAPEUTIC ACTIVITIES: CPT | Performed by: PHYSICAL THERAPIST

## 2021-04-02 NOTE — PROGRESS NOTES
Physical Therapy Daily Progress Note    Patient: Wei Salgado   : 1965  Diagnosis/ICD-10 Code:  Strain of lumbar region, initial encounter [S39.012A]  Referring practitioner: Shayna Messina MD  Date of Initial Visit: Type: THERAPY  Noted: 2021  Today's Date: 2021  Patient seen for 12 sessions         Wei Salgado reports: Pt states his back feels better today, but that his bilateral buttock pain is 6/10. States he ordered a massage table and is going to begin to perform self lumbar traction at home. Pt states that he would like to take a break from PT to see if he can manage his lumbar spine/leg pain on his own.    Subjective     Objective          Active Range of Motion     Lumbar   Flexion: Active lumbar flexion: 100%. relieving.   Extension: Active lumbar extension: 75% with pain  Left lateral flexion: Active left lumbar lateral flexion: 100%   Right lateral flexion: Active right lumbar lateral flexion: 100%   Left rotation: Active left lumbar rotation: 75%   Right rotation: Active right lumbar rotation: 75%, Rt hip pain.     Passive Range of Motion     Additional Passive Range of Motion Details  PAIVM: Concordant Rt hip pain L5-S1, L4-L5 UPA Rt      See Exercise, Manual, and Modality Logs for complete treatment.       Assessment & Plan     Assessment  Assessment details: Pt tolerated treatment well today. Pt with decreased reports of heaviness/pain in his bilateral legs and hips during standing exercises with PPT. Pt with increased reports of lumbar spine mobility post manual therapy techniques. Pt continues to have reports of leg heaviness/pain and changes in his gait/ambulation after standing/ambulating for long periods and relief of symptoms in sitting/lumbar flexion. Pt educated on possible options for continuing PT, performing HEP at home, or possible follow up with MD/ortho. Plan is for Pt to take a ~2 week break from PT and return on 2021 for possible d/c or continuation of  care depending on Pt's symptoms in his lumbar spine/olena LE at that time.        Progress per Plan of Care         Manual Therapy:    11     mins  11181;  Therapeutic Exercise:     30    mins  36757;     Neuromuscular Arielle:        mins  05246;    Therapeutic Activity:      14    mins  96390;     Gait Training:           mins  86053;     Ultrasound:          mins  02048;    Electrical Stimulation:         mins  93111 ( );  Dry Needling          mins self-pay  Mechanical Traction     15 mins  23387    Timed Treatment:   55   mins   Total Treatment:     80   mins    Ruy Fortune Student PT    Coni Coles, PT  Physical Therapist

## 2021-04-14 ENCOUNTER — TREATMENT (OUTPATIENT)
Dept: PHYSICAL THERAPY | Facility: CLINIC | Age: 56
End: 2021-04-14

## 2021-04-14 DIAGNOSIS — R26.2 DIFFICULTY IN WALKING: ICD-10-CM

## 2021-04-14 DIAGNOSIS — M54.50 LUMBAR SPINE PAINFUL ON MOVEMENT: ICD-10-CM

## 2021-04-14 DIAGNOSIS — S39.012A STRAIN OF LUMBAR REGION, INITIAL ENCOUNTER: Primary | ICD-10-CM

## 2021-04-14 PROCEDURE — 97164 PT RE-EVAL EST PLAN CARE: CPT | Performed by: PHYSICAL THERAPIST

## 2021-04-14 NOTE — PROGRESS NOTES
Re-Assessment / Re-Certification        Patient: Wei Salgado   : 1965  Diagnosis/ICD-10 Code:  Strain of lumbar region, initial encounter [S39.012A]  Referring practitioner: Shayna Messina MD  Date of Initial Visit: 2021  Today's Date: 2021  Patient seen for 13 sessions    Subjective:   Wei Salgado reports: he does not notice any changes in his back or legs since he started PT.  Pt has not had PT for a week and a half and continued with the HEP, but still has not noticed any changes.  Pt states he still has trouble walking for any amount of time and almost feels like his walking has gotten worse.  Pt describes his pain as heaviness and weakness in both legs.  States at time especially when he is carrying anything with weight, his legs almost feel like they are going to give out R > L.  Pt states the discomfort in his legs is constant, but he also gets some intermittent pain in the back of his calves.    Subjective Questionnaire: Oswestry: back index 17/50-34%  Clinical Progress: worse  Home Program Compliance: Yes  Treatment has included: therapeutic exercise, manual therapy, therapeutic activity, traction, moist heat and pt was instructed in a HEP.      Subjective   Objective          Tenderness     Lumbar Spine  Tenderness in the facet joint.     Left Hip   Tenderness in the PSIS.     Right Hip   Tenderness in the PSIS.     Additional Tenderness Details  Pt with minimal point tenderness over L1-L3 facet joints/transverse process with A-P mobilizations, pt with mild point tenderness over B gluteals and piriformis.  Pt moderate tenderness over the L1-L2-L3 intervertebral space.      Neurological Testing     Reflexes   Left   Patellar (L4): trace (1+)  Achilles (S1): absent (0)    Right   Patellar (L4): trace (1+)  Achilles (S1): absent (0)    Active Range of Motion     Lumbar   Flexion: 100 degrees   Extension: 25 degrees with pain  Left lateral flexion: 25 degrees with pain  Right lateral  flexion: 50 degrees with pain  Left rotation: 50 degrees   Right rotation: 50 degrees with pain    Strength/Myotome Testing     Left Hip   Planes of Motion   Flexion: 5    Right Hip   Planes of Motion   Flexion: 4+    Left Knee   Flexion: 5  Extension: 5    Right Knee   Flexion: 4+  Extension: 5    Left Ankle/Foot   Dorsiflexion: 5  Plantar flexion: 5  Eversion: 5  Great toe extension: 5    Right Ankle/Foot   Dorsiflexion: 5  Plantar flexion: 5  Eversion: 5  Great toe extension: 5    Additional Strength Details  Upper abdominal strength 2+/5     Tests     Lumbar     Left   Negative crossed SLR, femoral stretch and passive SLR.     Right   Negative crossed SLR, femoral stretch and passive SLR.     Left Hip   Positive piriformis.   Negative ROVERTO and FADIR.   90/90 SLR: Negative.   SLR: Positive.     Right Hip   Positive piriformis.   Negative ROVERTO and FADIR.   90/90 SLR: Negative.  SLR: Positive.     Additional Tests Details  Moderate hip tightness, but denies LBP with Roverto or Fadir testing  Mild B hamstring tightness with SLR, but minimal tightness with 90-90      Assessment & Plan     Assessment  Assessment details: Pt continues to report of B LE weakness and heaviness when ambulating or standing.  Pt was getting temporary relief from traction, but states by the time he would walk to the parking lot the heaviness in his legs returned.  Pt continues to have limited lumbar AROM, tenderness at L1-L3 facets and intervertebral space, diminished LE reflexes, and some right hip flexor and hamstring weakness.  Feel at this time pt has plateaued in PT and would benefit from further testing.         Progress toward previous goals: Partially Met    Pt met all STGs, but 1/4 LTGs.        1. Pt will be compliant with HEP MET  2. Pt will reportable </= min tenderness during accessory movement testing of the lumbar spine. MET   3. 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 Pt will be independent with HEP MET  2. Pt will improve lumbar AROM to 100% in all planes with no complaints of pain.  NOT MET   3. Pt will report pain </=1-2/10 with prolonged standing. NOT MET   4. Pt will report being able to sit on a hard surface without any numbness in his groin. NOT MET     Recommendations: Continue with recommendations holding treatment at this time due to pt reporting minimal subjective improvements.  Recommend further testing at this time due to pt's right hip flex and HS weakness, decreased lumbar AROM, and pt's continued c/o weakness and heaviness in B LEs when ambulating.      Prognosis to achieve goals: fair    PT Signature: Coni Coles, PT      Based upon review of the patient's progress and continued therapy plan, it is my medical opinion that Wei Salgado should continue physical therapy treatment at [facility]  [address] [place] [id number] [phone].    Signature: __________________________________  Shayna Messina MD    Manual Therapy:         mins  84349;  Therapeutic Exercise:         mins  09361;     Neuromuscular Arielle:        mins  62962;    Therapeutic Activity:          mins  05293;     Gait Training:           mins  31181;     Ultrasound:          mins  55499;    Electrical Stimulation:         mins  30529 ( );  Dry Needling          mins self-pay  Re-eval                         20 mins     Timed Treatment:      mins   Total Treatment:    25    mins

## 2021-04-20 ENCOUNTER — OFFICE VISIT (OUTPATIENT)
Dept: FAMILY MEDICINE CLINIC | Facility: CLINIC | Age: 56
End: 2021-04-20

## 2021-04-20 VITALS
HEART RATE: 84 BPM | SYSTOLIC BLOOD PRESSURE: 136 MMHG | OXYGEN SATURATION: 97 % | DIASTOLIC BLOOD PRESSURE: 90 MMHG | WEIGHT: 287.4 LBS | TEMPERATURE: 97.7 F | HEIGHT: 73 IN | BODY MASS INDEX: 38.09 KG/M2

## 2021-04-20 DIAGNOSIS — M54.50 ACUTE BILATERAL LOW BACK PAIN WITHOUT SCIATICA: Primary | ICD-10-CM

## 2021-04-20 DIAGNOSIS — R29.898 WEAKNESS OF BOTH LOWER EXTREMITIES: ICD-10-CM

## 2021-04-20 PROCEDURE — 99214 OFFICE O/P EST MOD 30 MIN: CPT | Performed by: FAMILY MEDICINE

## 2021-04-20 RX ORDER — MELOXICAM 15 MG/1
15 TABLET ORAL DAILY
Qty: 30 TABLET | Refills: 1 | Status: SHIPPED | OUTPATIENT
Start: 2021-04-20 | End: 2021-06-04

## 2021-04-20 NOTE — PROGRESS NOTES
Chief Complaint   Patient presents with   • Discuss physical w/ Dr Mayuri Rueda   Wei Salgado is a 55 y.o. male.     History of Present Illness   55-year-old male here to follow-up on reports of bilateral lower extremity swelling standing.    He had some temporary relief from traction with physical therapy but the symptoms would always come back.  This is not a new issue but has been going for several years now for the patient.  No acute neurological symptoms.    Lower back pain issues not improving with physical therapy.  Describes a feeling of heaviness and weakness in his bilateral lower extremities. He has decreased lumbar AROM and some weakness in his lower extremities when ambulating.  Physical therapy recommended further work-up/possible mri.    The following portions of the patient's history were reviewed and updated as appropriate: allergies, current medications, past family history, past medical history, past social history, past surgical history and problem list.      Past Medical History:   Diagnosis Date   • Allergic    • Arthritis    • Headache    • Hyperlipidemia    • Hypertension    • Sleep apnea     USES CPAP        Past Surgical History:   Procedure Laterality Date   • COLONOSCOPY N/A 11/20/2020    Procedure: COLONOSCOPY INTO CECUM;  Surgeon: Jesse Frey MD;  Location: [facility] ENDOSCOPY;  Service: Gastroenterology;  Laterality: N/A;  PRE: SCREENING  POST: HEMORRHOIDS   • HAND SURGERY  1977   • WISDOM TOOTH EXTRACTION         Family History   Problem Relation Age of Onset   • Arthritis Mother    • Mental illness Mother    • Stroke Father    • No Known Problems Sister    • No Known Problems Brother    • No Known Problems Brother    • No Known Problems Brother    • Malig Hyperthermia Neg Hx        Social History     Socioeconomic History   • Marital status:      Spouse name: Not on file   • Number of children: Not on file   • Years of education: Not on file   • Highest  education level: Not on file   Tobacco Use   • Smoking status: Never Smoker   • Smokeless tobacco: Never Used   Substance and Sexual Activity   • Alcohol use: Yes     Comment: very seldom   • Drug use: Never       Current Outpatient Medications on File Prior to Visit   Medication Sig Dispense Refill   • fluticasone (FLONASE) 50 MCG/ACT nasal spray 1 spray into the nostril(s) as directed by provider Daily. 1 bottle 3   • ibuprofen (ADVIL,MOTRIN) 200 MG tablet Take 200 mg by mouth Every 6 (Six) Hours As Needed for Mild Pain .     • losartan-hydrochlorothiazide (Hyzaar) 100-12.5 MG per tablet Take 1 tablet by mouth Daily. 90 tablet 1   • amoxicillin (AMOXIL) 500 MG capsule Take 2 capsules by mouth 3 (Three) Times a Day. 30 capsule 0   • losartan (COZAAR) 50 MG tablet Take 1 tablet by mouth Daily. 90 tablet 1   • losartan-hydrochlorothiazide (HYZAAR) 50-12.5 MG per tablet Take 1 tablet by mouth Daily. 30 tablet 3     No current facility-administered medications on file prior to visit.       Review of Systems   Constitutional: Negative for appetite change and fever.   HENT: Negative for congestion.    Gastrointestinal: Negative for abdominal pain.   Musculoskeletal: Positive for back pain.   Neurological: Negative for dizziness, tremors, weakness and confusion.   Psychiatric/Behavioral: Negative for behavioral problems.           Vitals:    04/20/21 1017   BP: 136/90   Pulse: 84   Temp: 97.7 °F (36.5 °C)   SpO2: 97%      Objective   Physical Exam  Vitals and nursing note reviewed.   Constitutional:       Appearance: He is well-developed.   HENT:      Head: Normocephalic.   Cardiovascular:      Rate and Rhythm: Normal rate and regular rhythm.      Heart sounds: Normal heart sounds. No murmur heard.     Pulmonary:      Effort: Pulmonary effort is normal. No respiratory distress.   Abdominal:      Palpations: Abdomen is soft.   Musculoskeletal:         General: Normal range of motion.      Cervical back: Neck supple.    Skin:     General: Skin is warm.   Neurological:      Mental Status: He is alert and oriented to person, place, and time.           Assessment/Plan   Problems Addressed this Visit     None      Visit Diagnoses     Acute bilateral low back pain without sciatica    -  Primary    Relevant Medications    meloxicam (Mobic) 15 MG tablet    Other Relevant Orders    MRI Lumbar Spine With & Without Contrast    Ambulatory Referral to Sports Medicine    Weakness of both lower extremities        Relevant Orders    MRI Lumbar Spine With & Without Contrast      Diagnoses       Codes Comments    Acute bilateral low back pain without sciatica    -  Primary ICD-10-CM: M54.5  ICD-9-CM: 724.2, 338.19     Weakness of both lower extremities     ICD-10-CM: R29.898  ICD-9-CM: 729.89         -Will refer to sports medicine for second opinion and get a lumbar MRI scheduled as well as back pain with weakness in spite of conservative care. No improvement with physical therapy and anti-inflammatories    -And continue the Mobic in the interim         Shayna Messina MD

## 2021-04-29 ENCOUNTER — TELEPHONE (OUTPATIENT)
Dept: FAMILY MEDICINE CLINIC | Facility: CLINIC | Age: 56
End: 2021-04-29

## 2021-04-29 DIAGNOSIS — M54.10 RADICULAR LOW BACK PAIN: Primary | ICD-10-CM

## 2021-04-29 RX ORDER — PREDNISONE 20 MG/1
TABLET ORAL
Qty: 15 TABLET | Refills: 0 | Status: SHIPPED | OUTPATIENT
Start: 2021-04-29 | End: 2021-06-04

## 2021-04-29 NOTE — TELEPHONE ENCOUNTER
Caller: Wei Salgado    Relationship: Self    Best call back number: [phone]      What is the best time to reach you: ANYTIME    Who are you requesting to speak with DOCTOR OR MA      Do you know the name of the person who called: WEI    What was the call regarding: PATIENT STATES HE IS STILL IN A LOT OF PAIN THE MELOXICAM IS NOT DOING MUCH. HE IS WANTING TO KNOW IF THERE IS ANYTHING ELSE HE CAN TRY.    Do you require a callback: YES

## 2021-04-29 NOTE — TELEPHONE ENCOUNTER
Patient states pain is while moving about.  Described as a sudden shock radiating from center back down rt let.  Please advise

## 2021-04-29 NOTE — TELEPHONE ENCOUNTER
-We could try other anti-inflammatories but that does not seem to be helping  -Can we clarify is he having back pain up while moving around or what his pain is like?  -We could try a longer steroid taper

## 2021-04-29 NOTE — TELEPHONE ENCOUNTER
-I will send in a steroid taper for him to start and after that may need to consider other pain med options    -Is his MRI scheduled?   -Based on MRI results can determine further plan such as injections or neuro doctor evaluation etc  -IF he develops new weakness etc is to notify us

## 2021-04-29 NOTE — TELEPHONE ENCOUNTER
Karin from [facility] in [place] called stating that since you are not looking for an infection, mass or previous back surg contract is not used in the  Ct. Should you decide to not do with contrast please forward message to pool to call.  Thanks

## 2021-05-01 ENCOUNTER — HOSPITAL ENCOUNTER (EMERGENCY)
Facility: HOSPITAL | Age: 56
Discharge: HOME | End: 2021-05-01
Attending: EMERGENCY MEDICINE | Admitting: EMERGENCY MEDICINE

## 2021-05-01 ENCOUNTER — APPOINTMENT (OUTPATIENT)
Dept: CT IMAGING | Facility: HOSPITAL | Age: 56
End: 2021-05-01

## 2021-05-01 VITALS
HEART RATE: 75 BPM | HEIGHT: 73 IN | SYSTOLIC BLOOD PRESSURE: 120 MMHG | BODY MASS INDEX: 37.92 KG/M2 | OXYGEN SATURATION: 97 % | TEMPERATURE: 98 F | DIASTOLIC BLOOD PRESSURE: 68 MMHG | RESPIRATION RATE: 18 BRPM

## 2021-05-01 DIAGNOSIS — N20.1 URETERAL CALCULUS, LEFT: Primary | ICD-10-CM

## 2021-05-01 LAB
ALBUMIN SERPL-MCNC: 4.4 G/DL (ref 3.5–5.2)
ALBUMIN/GLOB SERPL: 1.8 G/DL
ALP SERPL-CCNC: 71 U/L (ref 39–117)
ALT SERPL W P-5'-P-CCNC: 35 U/L (ref 1–41)
ANION GAP SERPL CALCULATED.3IONS-SCNC: 9.3 MMOL/L (ref 5–15)
AST SERPL-CCNC: 22 U/L (ref 1–40)
BACTERIA UR QL AUTO: ABNORMAL /HPF
BASOPHILS # BLD AUTO: 0.06 10*3/MM3 (ref 0–0.2)
BASOPHILS NFR BLD AUTO: 0.6 % (ref 0–1.5)
BILIRUB SERPL-MCNC: 0.4 MG/DL (ref 0–1.2)
BILIRUB UR QL STRIP: NEGATIVE
BUN SERPL-MCNC: 22 MG/DL (ref 6–20)
BUN/CREAT SERPL: 22.7 (ref 7–25)
CALCIUM SPEC-SCNC: 8.9 MG/DL (ref 8.6–10.5)
CHLORIDE SERPL-SCNC: 104 MMOL/L (ref 98–107)
CLARITY UR: CLEAR
CO2 SERPL-SCNC: 26.7 MMOL/L (ref 22–29)
COD CRY URNS QL: ABNORMAL /HPF
COLOR UR: YELLOW
CREAT SERPL-MCNC: 0.97 MG/DL (ref 0.76–1.27)
DEPRECATED RDW RBC AUTO: 45 FL (ref 37–54)
EOSINOPHIL # BLD AUTO: 0.08 10*3/MM3 (ref 0–0.4)
EOSINOPHIL NFR BLD AUTO: 0.8 % (ref 0.3–6.2)
ERYTHROCYTE [DISTWIDTH] IN BLOOD BY AUTOMATED COUNT: 13.4 % (ref 12.3–15.4)
GFR SERPL CREATININE-BSD FRML MDRD: 80 ML/MIN/1.73
GLOBULIN UR ELPH-MCNC: 2.4 GM/DL
GLUCOSE SERPL-MCNC: 125 MG/DL (ref 65–99)
GLUCOSE UR STRIP-MCNC: NEGATIVE MG/DL
HCT VFR BLD AUTO: 41.5 % (ref 37.5–51)
HGB BLD-MCNC: 13.9 G/DL (ref 13–17.7)
HGB UR QL STRIP.AUTO: ABNORMAL
HYALINE CASTS UR QL AUTO: ABNORMAL /LPF
IMM GRANULOCYTES # BLD AUTO: 0.05 10*3/MM3 (ref 0–0.05)
IMM GRANULOCYTES NFR BLD AUTO: 0.5 % (ref 0–0.5)
KETONES UR QL STRIP: NEGATIVE
LEUKOCYTE ESTERASE UR QL STRIP.AUTO: NEGATIVE
LYMPHOCYTES # BLD AUTO: 1.74 10*3/MM3 (ref 0.7–3.1)
LYMPHOCYTES NFR BLD AUTO: 17 % (ref 19.6–45.3)
MCH RBC QN AUTO: 30.6 PG (ref 26.6–33)
MCHC RBC AUTO-ENTMCNC: 33.5 G/DL (ref 31.5–35.7)
MCV RBC AUTO: 91.4 FL (ref 79–97)
MONOCYTES # BLD AUTO: 0.57 10*3/MM3 (ref 0.1–0.9)
MONOCYTES NFR BLD AUTO: 5.6 % (ref 5–12)
MUCOUS THREADS URNS QL MICRO: ABNORMAL /HPF
NEUTROPHILS NFR BLD AUTO: 7.74 10*3/MM3 (ref 1.7–7)
NEUTROPHILS NFR BLD AUTO: 75.5 % (ref 42.7–76)
NITRITE UR QL STRIP: NEGATIVE
NRBC BLD AUTO-RTO: 0 /100 WBC (ref 0–0.2)
PH UR STRIP.AUTO: 5.5 [PH] (ref 4.5–8)
PLATELET # BLD AUTO: 240 10*3/MM3 (ref 140–450)
PMV BLD AUTO: 11.2 FL (ref 6–12)
POTASSIUM SERPL-SCNC: 3.3 MMOL/L (ref 3.5–5.2)
PROT SERPL-MCNC: 6.8 G/DL (ref 6–8.5)
PROT UR QL STRIP: ABNORMAL
RBC # BLD AUTO: 4.54 10*6/MM3 (ref 4.14–5.8)
RBC # UR: ABNORMAL /HPF
REF LAB TEST METHOD: ABNORMAL
SODIUM SERPL-SCNC: 140 MMOL/L (ref 136–145)
SP GR UR STRIP: 1.02 (ref 1–1.03)
SQUAMOUS #/AREA URNS HPF: ABNORMAL /HPF
UROBILINOGEN UR QL STRIP: ABNORMAL
WBC # BLD AUTO: 10.24 10*3/MM3 (ref 3.4–10.8)
WBC UR QL AUTO: ABNORMAL /HPF

## 2021-05-01 PROCEDURE — 74176 CT ABD & PELVIS W/O CONTRAST: CPT

## 2021-05-01 PROCEDURE — 99283 EMERGENCY DEPT VISIT LOW MDM: CPT

## 2021-05-01 PROCEDURE — 80053 COMPREHEN METABOLIC PANEL: CPT | Performed by: EMERGENCY MEDICINE

## 2021-05-01 PROCEDURE — 81001 URINALYSIS AUTO W/SCOPE: CPT | Performed by: EMERGENCY MEDICINE

## 2021-05-01 PROCEDURE — 99284 EMERGENCY DEPT VISIT MOD MDM: CPT | Performed by: EMERGENCY MEDICINE

## 2021-05-01 PROCEDURE — 85025 COMPLETE CBC W/AUTO DIFF WBC: CPT | Performed by: EMERGENCY MEDICINE

## 2021-05-01 RX ORDER — ONDANSETRON 8 MG/1
TABLET, ORALLY DISINTEGRATING ORAL
Qty: 10 TABLET | Refills: 0 | Status: SHIPPED | OUTPATIENT
Start: 2021-05-01 | End: 2021-05-12

## 2021-05-01 RX ORDER — CEFUROXIME AXETIL 250 MG/1
250 TABLET ORAL 2 TIMES DAILY
Qty: 14 TABLET | Refills: 0 | Status: SHIPPED | OUTPATIENT
Start: 2021-05-01 | End: 2021-05-08

## 2021-05-01 RX ORDER — TAMSULOSIN HYDROCHLORIDE 0.4 MG/1
1 CAPSULE ORAL DAILY
Qty: 14 CAPSULE | Refills: 0 | Status: SHIPPED | OUTPATIENT
Start: 2021-05-01 | End: 2021-05-15

## 2021-05-01 RX ORDER — SODIUM CHLORIDE 0.9 % (FLUSH) 0.9 %
10 SYRINGE (ML) INJECTION AS NEEDED
Status: DISCONTINUED | OUTPATIENT
Start: 2021-05-01 | End: 2021-05-01 | Stop reason: HOSPADM

## 2021-05-01 RX ORDER — OXYCODONE HYDROCHLORIDE AND ACETAMINOPHEN 5; 325 MG/1; MG/1
1 TABLET ORAL EVERY 4 HOURS PRN
Qty: 20 TABLET | Refills: 0 | Status: SHIPPED | OUTPATIENT
Start: 2021-05-01 | End: 2021-05-12

## 2021-05-01 NOTE — ED PROVIDER NOTES
Subjective     History provided by:  Patient    History of Present Illness    · Chief complaint: Abdominal pain    · Location: Left lower quadrant radiating around the left side.    · Quality/Severity: Moderately severe cramping pain that goes and comes.    · Timing/Onset: Started 2-1/2 hours ago.    · Modifying Factors: No aggravating or relieving factors.    · Associated symptoms: Reports associated nausea and vomited a couple times last hour.  Reports sensation he needs to urinate but is unable to.  Denies any fever.    · Narrative: The patient is a 55-year-old white male complaining of left lower quadrant abdominal pain that he describes as crampy and comes and goes.  The pain radiates around his left side.  He had associated nausea and vomiting the last hour.  He denies a history of similar pain.  He has no previous history of kidney stones.  He does have a family history with a brother having kidney stones.  He has a history of low back pain and sciatica for 1 to 2 years.  He has a history of hypertension.  Past surgical history is negative.  He is currently pain-free.    Review of Systems   Constitutional: Negative for activity change, appetite change, chills, diaphoresis, fatigue and fever.   HENT: Negative for congestion, dental problem, ear pain, hearing loss, mouth sores, postnasal drip, rhinorrhea, sinus pressure, sore throat and voice change.    Eyes: Negative for photophobia, pain, discharge, redness and visual disturbance.   Respiratory: Negative for cough, chest tightness, shortness of breath, wheezing and stridor.    Cardiovascular: Negative for chest pain, palpitations and leg swelling.   Gastrointestinal: Positive for abdominal pain, nausea and vomiting. Negative for diarrhea.   Genitourinary: Positive for difficulty urinating. Negative for discharge, dysuria, flank pain, frequency, hematuria, penile pain, testicular pain and urgency.   Musculoskeletal: Negative for arthralgias, back pain, gait  "problem, joint swelling, myalgias, neck pain and neck stiffness.   Skin: Negative for color change and rash.   Neurological: Negative for dizziness, tremors, seizures, syncope, facial asymmetry, speech difficulty, weakness, light-headedness, numbness and headaches.   Hematological: Negative for adenopathy.   Psychiatric/Behavioral: Negative.  Negative for confusion and decreased concentration. The patient is not nervous/anxious.      Past Medical History:   Diagnosis Date   • Allergic    • Arthritis    • Headache    • Hyperlipidemia    • Hypertension    • Sleep apnea     USES CPAP      /96 (BP Location: Right arm, Patient Position: Sitting)   Pulse 75   Temp 98 °F (36.7 °C) (Oral)   Resp 18   Ht 185.4 cm (73\")   SpO2 97%   BMI 37.92 kg/m²     Past Medical History:   Diagnosis Date   • Allergic    • Arthritis    • Headache    • Hyperlipidemia    • Hypertension    • Sleep apnea     USES CPAP        No Known Allergies    Past Surgical History:   Procedure Laterality Date   • COLONOSCOPY N/A 11/20/2020    Procedure: COLONOSCOPY INTO CECUM;  Surgeon: Jesse Frey MD;  Location: [facility] ENDOSCOPY;  Service: Gastroenterology;  Laterality: N/A;  PRE: SCREENING  POST: HEMORRHOIDS   • HAND SURGERY  1977   • WISDOM TOOTH EXTRACTION         Family History   Problem Relation Age of Onset   • Arthritis Mother    • Mental illness Mother    • Stroke Father    • No Known Problems Sister    • No Known Problems Brother    • No Known Problems Brother    • No Known Problems Brother    • Malig Hyperthermia Neg Hx        Social History     Socioeconomic History   • Marital status:      Spouse name: Not on file   • Number of children: Not on file   • Years of education: Not on file   • Highest education level: Not on file   Tobacco Use   • Smoking status: Never Smoker   • Smokeless tobacco: Never Used   Substance and Sexual Activity   • Alcohol use: Yes     Comment: very seldom   • Drug use: Never   • Sexual " activity: Defer           Objective   Physical Exam  Vitals and nursing note reviewed.   Constitutional:       General: He is not in acute distress.     Appearance: Normal appearance. He is well-developed. He is obese. He is not ill-appearing, toxic-appearing or diaphoretic.      Comments: The patient appears healthy in no acute distress.  Review of his vital signs: He is afebrile with a temperature 98, blood pressure slightly elevated 165/96, remainder vital signs within normal limits.   HENT:      Head: Normocephalic and atraumatic.      Nose: Nose normal.      Mouth/Throat:      Mouth: Mucous membranes are moist.      Pharynx: Oropharynx is clear. No oropharyngeal exudate.   Eyes:      General: No scleral icterus.        Right eye: No discharge.         Left eye: No discharge.      Conjunctiva/sclera: Conjunctivae normal.      Pupils: Pupils are equal, round, and reactive to light.   Neck:      Thyroid: No thyromegaly.      Vascular: No JVD.   Cardiovascular:      Rate and Rhythm: Normal rate and regular rhythm.      Heart sounds: Normal heart sounds. No murmur heard.     Pulmonary:      Effort: Pulmonary effort is normal.      Breath sounds: Normal breath sounds. No wheezing or rales.   Chest:      Chest wall: No tenderness.   Abdominal:      General: Bowel sounds are normal. There is no distension.      Palpations: Abdomen is soft.      Tenderness: There is no abdominal tenderness. There is no right CVA tenderness or left CVA tenderness.   Musculoskeletal:         General: No tenderness or deformity. Normal range of motion.      Cervical back: Normal range of motion and neck supple.   Lymphadenopathy:      Cervical: No cervical adenopathy.   Skin:     General: Skin is warm and dry.      Capillary Refill: Capillary refill takes less than 2 seconds.      Findings: No rash.   Neurological:      General: No focal deficit present.      Mental Status: He is alert and oriented to person, place, and time.      Cranial  Nerves: No cranial nerve deficit.      Coordination: Coordination normal.      Comments: No focal motor sensory deficit   Psychiatric:         Behavior: Behavior normal.         Thought Content: Thought content normal.         Judgment: Judgment normal.         Procedures           ED Course  ED Course as of May 01 1606   Sat May 01, 2021   1551 Review the patient's test results: His CMP had a slightly low potassium of 3.3 and a slightly elevated glucose of 125, otherwise normal electrolytes, normal renal and liver function test.  CBC had a normal white count 10.24 with a slight left shift.  Hemoglobin, hematocrit and platelets within normal limits.  Urinalysis had too numerous to count red blood cells without evidence of infection.  CT of the abdomen pelvis without contrast was interpreted by the radiologist as showing a 4 mm distal left ureteral stone with resultant hydroureter.    [TP]   1552 I discussed with the patient his diagnosis of a left ureteral calculus and test results.  He will be prescribed Percocet, Zofran, Flomax, and Ceftin.  He is currently already taking meloxicam.  He will be instructed to make an appointment to follow-up with Dr. Valentin, urology on-call.    [TP]      ED Course User Index  [TP] Bernabe Arrieta MD                                           MDM  Number of Diagnoses or Management Options  Ureteral calculus, left: new and requires workup     Amount and/or Complexity of Data Reviewed  Clinical lab tests: ordered and reviewed  Tests in the radiology section of CPT®: ordered and reviewed    Risk of Complications, Morbidity, and/or Mortality  Presenting problems: high  Diagnostic procedures: high  Management options: high  General comments: My differential diagnosis for abdominal pain includes but is not limited to:  Gastritis, gastroenteritis, peptic ulcer disease, GERD, esophageal perforation, acute appendicitis, mesenteric adenitis, Meckel’s diverticulum, epiploic appendagitis,  diverticulitis, colon cancer, ulcerative colitis, Crohn’s disease, intussusception, small bowel obstruction, adhesions, ischemic bowel, perforated viscus, ileus, obstipation, biliary colic, cholecystitis, cholelithiasis, Nico-Winston Rick, hepatitis, pancreatitis, common bile duct obstruction, cholangitis, bile leak, splenic trauma, splenic rupture, splenic infarction, splenic abscess, abdominal abscess, ascites, spontaneous bacterial peritonitis, hernia, UTI, cystitis, prostatitis, ureterolithiasis, urinary obstruction, ovarian cyst, torsion, pregnancy, ectopic pregnancy, PID, pelvic abscess, mittelschmerz, endometriosis, AAA, myocardial infarction, pneumonia, cancer, porphyria, DKA, medications, sickle cell, viral syndrome, zoster    Patient Progress  Patient progress: stable      Final diagnoses:   Ureteral calculus, left       ED Disposition  ED Disposition     ED Disposition Condition Comment    Discharge Stable           Jethro Valentin MD  [address] [place] [address] [phone]    Schedule an appointment as soon as possible for a visit   next available         Medication List      New Prescriptions    cefuroxime 250 MG tablet  Commonly known as: CEFTIN  Take 1 tablet by mouth 2 (Two) Times a Day for 7 days.     ondansetron ODT 8 MG disintegrating tablet  Commonly known as: ZOFRAN-ODT  One tablet po q 6 hours PRN nausea and vomiting     oxyCODONE-acetaminophen 5-325 MG per tablet  Commonly known as: Percocet  Take 1 tablet by mouth Every 4 (Four) Hours As Needed for Severe Pain .     tamsulosin 0.4 MG capsule 24 hr capsule  Commonly known as: FLOMAX  Take 1 capsule by mouth Daily for 14 doses.           Where to Get Your Medications      These medications were sent to [facility] [address] [place] [address] [facility] - [phone]  - [phone] [address] [address]    Phone: [phone]   · cefuroxime 250 MG tablet  · ondansetron ODT  8 MG disintegrating tablet  · oxyCODONE-acetaminophen 5-325 MG per tablet  · tamsulosin 0.4 MG capsule 24 hr capsule         Labs Reviewed   COMPREHENSIVE METABOLIC PANEL - Abnormal; Notable for the following components:       Result Value    Glucose 125 (*)     BUN 22 (*)     Potassium 3.3 (*)     All other components within normal limits    Narrative:     GFR Normal >60  Chronic Kidney Disease <60  Kidney Failure <15     URINALYSIS W/ MICROSCOPIC IF INDICATED (NO CULTURE) - Abnormal; Notable for the following components:    Blood, UA Large (3+) (*)     Protein, UA 30 mg/dL (1+) (*)     All other components within normal limits   CBC WITH AUTO DIFFERENTIAL - Abnormal; Notable for the following components:    Lymphocyte % 17.0 (*)     Neutrophils, Absolute 7.74 (*)     All other components within normal limits   URINALYSIS, MICROSCOPIC ONLY - Abnormal; Notable for the following components:    RBC, UA Too Numerous to Count (*)     WBC, UA 0-2 (*)     Bacteria, UA Trace (*)     Mucus, UA Small/1+ (*)     All other components within normal limits   CBC AND DIFFERENTIAL    Narrative:     The following orders were created for panel order CBC & Differential.  Procedure                               Abnormality         Status                     ---------                               -----------         ------                     CBC Auto Differential[[id number]]        Abnormal            Final result                 Please view results for these tests on the individual orders.     CT Abdomen Pelvis Without Contrast   Final Result   There is a 4 mm stone in the patient's distal left ureter with resultant hydroureter.               Signer Name: Leydi Franco MD    Signed: 5/1/2021 3:42 PM    Workstation Name: [id number]     [facility]             Medication List      New Prescriptions    cefuroxime 250 MG tablet  Commonly known as: CEFTIN  Take 1 tablet by mouth 2 (Two) Times a Day for 7 days.      ondansetron ODT 8 MG disintegrating tablet  Commonly known as: ZOFRAN-ODT  One tablet po q 6 hours PRN nausea and vomiting     oxyCODONE-acetaminophen 5-325 MG per tablet  Commonly known as: Percocet  Take 1 tablet by mouth Every 4 (Four) Hours As Needed for Severe Pain .     tamsulosin 0.4 MG capsule 24 hr capsule  Commonly known as: FLOMAX  Take 1 capsule by mouth Daily for 14 doses.           Where to Get Your Medications      These medications were sent to [facility] - [address] - [phone]  - [phone] [address] [address]    Phone: [phone]   · cefuroxime 250 MG tablet  · ondansetron ODT 8 MG disintegrating tablet  · oxyCODONE-acetaminophen 5-325 MG per tablet  · tamsulosin 0.4 MG capsule 24 hr capsule              Bernabe Arrieta MD  05/01/21 1282

## 2021-05-12 ENCOUNTER — TELEPHONE (OUTPATIENT)
Dept: FAMILY MEDICINE CLINIC | Facility: CLINIC | Age: 56
End: 2021-05-12

## 2021-05-12 ENCOUNTER — OFFICE VISIT (OUTPATIENT)
Dept: FAMILY MEDICINE CLINIC | Facility: CLINIC | Age: 56
End: 2021-05-12

## 2021-05-12 VITALS
WEIGHT: 286.4 LBS | TEMPERATURE: 97.7 F | HEIGHT: 73 IN | HEART RATE: 69 BPM | SYSTOLIC BLOOD PRESSURE: 158 MMHG | BODY MASS INDEX: 37.96 KG/M2 | DIASTOLIC BLOOD PRESSURE: 90 MMHG | OXYGEN SATURATION: 98 %

## 2021-05-12 DIAGNOSIS — M54.16 CHRONIC RADICULAR PAIN OF LOWER BACK: ICD-10-CM

## 2021-05-12 DIAGNOSIS — M54.50 CHRONIC BILATERAL LOW BACK PAIN WITHOUT SCIATICA: Primary | ICD-10-CM

## 2021-05-12 DIAGNOSIS — G89.29 CHRONIC BILATERAL LOW BACK PAIN WITHOUT SCIATICA: Primary | ICD-10-CM

## 2021-05-12 DIAGNOSIS — G89.29 CHRONIC RADICULAR PAIN OF LOWER BACK: ICD-10-CM

## 2021-05-12 DIAGNOSIS — R29.898 WEAKNESS OF BOTH LOWER EXTREMITIES: ICD-10-CM

## 2021-05-12 PROCEDURE — 99213 OFFICE O/P EST LOW 20 MIN: CPT | Performed by: FAMILY MEDICINE

## 2021-05-12 RX ORDER — HYDROCODONE BITARTRATE AND ACETAMINOPHEN 5; 325 MG/1; MG/1
1 TABLET ORAL 2 TIMES DAILY PRN
Qty: 60 TABLET | Refills: 0 | Status: SHIPPED | OUTPATIENT
Start: 2021-05-12 | End: 2021-05-13

## 2021-05-12 RX ORDER — NAPROXEN 500 MG/1
500 TABLET ORAL 2 TIMES DAILY WITH MEALS
Qty: 30 TABLET | Refills: 0 | Status: SHIPPED | OUTPATIENT
Start: 2021-05-12 | End: 2021-06-03 | Stop reason: SDUPTHER

## 2021-05-12 NOTE — TELEPHONE ENCOUNTER
BLADIMIR pharmacist Jonah at [facility] pharmacy and he stated insurance will not cover the Hydrocodone with the diagnosis given. We will need to do a PA for this. PA request has been faxed to our office to process.

## 2021-05-12 NOTE — TELEPHONE ENCOUNTER
Pharmacy Name: [facility]     Pharmacy representative name: CEDRIC    Pharmacy representative phone number:[phone]    What medication are you calling in regards to: HYDROCODONE    What question does the pharmacy have: PHARMACY CALLING IN REGARDS TO LET DR CONTRERAS KNOW PRESCRIPTION REQUIRES PRIOR AUTHORIZATION. [facility] CONTR[facility]AS WOULD LIKE TO CALL IN A VERBAL. PLEASE ADVISE, THANK You!    Who is the provider that prescribed the medication: DR. CONTRERAS

## 2021-05-12 NOTE — PROGRESS NOTES
Chief Complaint   Patient presents with   • Med Refill       Subjective   Wei Salgado is a 55 y.o. male.     History of Present Illness   55-year-old male here for chronic lower back pain    Chronic lower back pain: Feeling of heaviness in his bilateral lower extremities.  Lumbar back pain.  Occasional radicular symptoms.  No new weakness no new neurological signs. He had no improvement with physical therapy/conservative tx.     Feels better with forward flexion of the back.  Never had lumbar MRI before.  He also relief with steroids but then his pain returned when he completed the steroid course.  Anti-inflammatories like ibuprofen and Mobic have not given enough relief.    The following portions of the patient's history were reviewed and updated as appropriate: allergies, current medications, past family history, past medical history, past social history, past surgical history and problem list.      Past Medical History:   Diagnosis Date   • Allergic    • Arthritis    • Headache    • Hyperlipidemia    • Hypertension    • Sleep apnea     USES CPAP        Past Surgical History:   Procedure Laterality Date   • COLONOSCOPY N/A 11/20/2020    Procedure: COLONOSCOPY INTO CECUM;  Surgeon: Jesse Frey MD;  Location: [facility] ENDOSCOPY;  Service: Gastroenterology;  Laterality: N/A;  PRE: SCREENING  POST: HEMORRHOIDS   • HAND SURGERY  1977   • WISDOM TOOTH EXTRACTION         Family History   Problem Relation Age of Onset   • Arthritis Mother    • Mental illness Mother    • Stroke Father    • No Known Problems Sister    • No Known Problems Brother    • No Known Problems Brother    • No Known Problems Brother    • Malig Hyperthermia Neg Hx        Social History     Socioeconomic History   • Marital status:      Spouse name: Not on file   • Number of children: Not on file   • Years of education: Not on file   • Highest education level: Not on file   Tobacco Use   • Smoking status: Never Smoker   • Smokeless  tobacco: Never Used   Substance and Sexual Activity   • Alcohol use: Yes     Comment: very seldom   • Drug use: Never   • Sexual activity: Defer       Current Outpatient Medications on File Prior to Visit   Medication Sig Dispense Refill   • losartan-hydrochlorothiazide (Hyzaar) 100-12.5 MG per tablet Take 1 tablet by mouth Daily. 90 tablet 1   • meloxicam (Mobic) 15 MG tablet Take 1 tablet by mouth Daily. 30 tablet 1   • tamsulosin (FLOMAX) 0.4 MG capsule 24 hr capsule Take 1 capsule by mouth Daily for 14 doses. 14 capsule 0   • predniSONE (DELTASONE) 20 MG tablet Take 2 tablets by mouth for 5 days.  Then take 1 tablets by mouth for 5 days. 15 tablet 0     No current facility-administered medications on file prior to visit.       Review of Systems   Constitutional: Negative for chills and fever.   HENT: Negative for congestion.    Respiratory: Negative for cough and wheezing.    Cardiovascular: Negative for chest pain.   Gastrointestinal: Negative for blood in stool and vomiting.   Genitourinary: Negative for decreased urine volume.   Musculoskeletal: Positive for back pain.   Neurological: Positive for weakness. Negative for dizziness and confusion.   Psychiatric/Behavioral: Negative for behavioral problems.           Vitals:    05/12/21 1014   BP: 158/90   Pulse: 69   Temp: 97.7 °F (36.5 °C)   SpO2: 98%      Objective   Physical Exam  Vitals and nursing note reviewed.   Constitutional:       Appearance: He is well-developed.   HENT:      Head: Normocephalic.   Cardiovascular:      Rate and Rhythm: Normal rate and regular rhythm.      Heart sounds: Normal heart sounds. No murmur heard.     Pulmonary:      Effort: Pulmonary effort is normal. No respiratory distress.      Breath sounds: Normal breath sounds.   Abdominal:      Palpations: Abdomen is soft.   Musculoskeletal:         General: Normal range of motion.      Cervical back: Neck supple.   Skin:     General: Skin is warm and dry.   Neurological:       General: No focal deficit present.      Mental Status: He is alert and oriented to person, place, and time. Mental status is at baseline.           Assessment/Plan   Problems Addressed this Visit     None      Visit Diagnoses     Chronic bilateral low back pain without sciatica    -  Primary    Relevant Medications    naproxen (Naprosyn) 500 MG tablet    HYDROcodone-acetaminophen (Norco) 5-325 MG per tablet    Chronic radicular pain of lower back        Relevant Medications    HYDROcodone-acetaminophen (Norco) 5-325 MG per tablet    Weakness of both lower extremities        Relevant Medications    HYDROcodone-acetaminophen (Norco) 5-325 MG per tablet      Diagnoses       Codes Comments    Chronic bilateral low back pain without sciatica    -  Primary ICD-10-CM: M54.5, G89.29  ICD-9-CM: 724.2, 338.29     Chronic radicular pain of lower back     ICD-10-CM: M54.16, G89.29  ICD-9-CM: 724.4, 338.29     Weakness of both lower extremities     ICD-10-CM: R29.898  ICD-9-CM: 729.89         May try a stronger anti-inflammatory like naproxen twice daily.  Can also try Norco if no relief from naproxen.  We will follow up on lumbar MRI.  If he has any new neurological symptoms or issues he is to contact us in the interim           Return in about 3 months (around 8/12/2021).      Shayna Messina MD

## 2021-05-13 RX ORDER — HYDROCODONE BITARTRATE AND ACETAMINOPHEN 5; 325 MG/1; MG/1
1 TABLET ORAL 2 TIMES DAILY PRN
Qty: 60 TABLET | Refills: 0 | Status: SHIPPED | OUTPATIENT
Start: 2021-05-13 | End: 2021-05-21 | Stop reason: SDUPTHER

## 2021-05-14 ENCOUNTER — HOSPITAL ENCOUNTER (OUTPATIENT)
Dept: MRI IMAGING | Facility: HOSPITAL | Age: 56
Discharge: HOME | End: 2021-05-14
Admitting: FAMILY MEDICINE

## 2021-05-14 DIAGNOSIS — M54.50 ACUTE BILATERAL LOW BACK PAIN WITHOUT SCIATICA: ICD-10-CM

## 2021-05-14 DIAGNOSIS — R29.898 WEAKNESS OF BOTH LOWER EXTREMITIES: ICD-10-CM

## 2021-05-14 DIAGNOSIS — M48.062 SPINAL STENOSIS OF LUMBAR REGION WITH NEUROGENIC CLAUDICATION: Primary | ICD-10-CM

## 2021-05-14 PROCEDURE — 72148 MRI LUMBAR SPINE W/O DYE: CPT

## 2021-05-19 ENCOUNTER — OFFICE VISIT (OUTPATIENT)
Dept: SPORTS MEDICINE | Facility: CLINIC | Age: 56
End: 2021-05-19

## 2021-05-19 VITALS
WEIGHT: 285 LBS | RESPIRATION RATE: 16 BRPM | HEART RATE: 68 BPM | DIASTOLIC BLOOD PRESSURE: 78 MMHG | BODY MASS INDEX: 37.77 KG/M2 | TEMPERATURE: 98.4 F | OXYGEN SATURATION: 98 % | HEIGHT: 73 IN | SYSTOLIC BLOOD PRESSURE: 129 MMHG

## 2021-05-19 DIAGNOSIS — M51.36 DEGENERATIVE DISC DISEASE, LUMBAR: ICD-10-CM

## 2021-05-19 DIAGNOSIS — M47.816 FACET ARTHROPATHY, LUMBAR: ICD-10-CM

## 2021-05-19 DIAGNOSIS — G89.29 CHRONIC BILATERAL LOW BACK PAIN WITH RIGHT-SIDED SCIATICA: Primary | ICD-10-CM

## 2021-05-19 DIAGNOSIS — M48.061 SPINAL STENOSIS OF LUMBAR REGION, UNSPECIFIED WHETHER NEUROGENIC CLAUDICATION PRESENT: ICD-10-CM

## 2021-05-19 DIAGNOSIS — M54.41 CHRONIC BILATERAL LOW BACK PAIN WITH RIGHT-SIDED SCIATICA: Primary | ICD-10-CM

## 2021-05-19 PROCEDURE — 99244 OFF/OP CNSLTJ NEW/EST MOD 40: CPT | Performed by: FAMILY MEDICINE

## 2021-05-19 NOTE — PROGRESS NOTES
"Chief Complaint  Back Pain (pain radiating down right hip - pain over a year progressively getting worse in past 2 months)    Subjective          Wei Salgado presents to [facility]  History of Present Illness  Seen at the request of Dr. Messina. Chronic.  Pain has been ongoing for the past year but has progressed over the past 2 months.  He has been in physical therapy though this was making his pain worse.  He was taking over-the-counter medication prior to prescriptions from Dr. Messina.  Was initially on meloxicam, has tried prednisone and currently is taking Norco as needed.  No bowel or bladder incontinence.  He states that his position of comfort is usually seated as his pain seems to be worse when standing.  He can walk short distances.  He is manual labor.  He has had symptoms down both legs but primarily his pain is on the right.  Pending appointment neurosurgery 6/4/2021.    Objective   Vital Signs:   /78 (BP Location: Right arm, Patient Position: Sitting, Cuff Size: Large Adult)   Pulse 68   Temp 98.4 °F (36.9 °C) (Temporal)   Resp 16   Ht 185.4 cm (73\")   Wt 129 kg (285 lb)   SpO2 98%   BMI 37.60 kg/m²     Physical Exam  Vitals and nursing note reviewed.   Constitutional:       Appearance: He is well-developed.   HENT:      Head: Normocephalic and atraumatic.   Eyes:      Conjunctiva/sclera: Conjunctivae normal.   Pulmonary:      Effort: No accessory muscle usage or respiratory distress.   Musculoskeletal:         General: No deformity.      Lumbar back: Tenderness (b/l lower paraspinals) present. No bony tenderness. Negative right straight leg raise test and negative left straight leg raise test.      Comments: Negative WINDY and FADIR   Skin:     General: Skin is warm and dry.      Findings: No rash.   Neurological:      Mental Status: He is alert and oriented to person, place, and time.      Gait: Gait abnormal.      Deep Tendon Reflexes:      Reflex " Scores:       Patellar reflexes are 1+ on the right side and 1+ on the left side.       Achilles reflexes are 1+ on the right side and 1+ on the left side.  Psychiatric:         Behavior: Behavior normal.        Result Review :       Data reviewed: Radiologic studies MRI Lumbar Spine Without Contrast (05/14/2021 11:08)       Independent review of outside MRI lumbar spine without contrast 5/14/2021. In summation, multilevel degenerative disc disease with thecal sac narrowing, facet arthropathy and neuroforaminal narrowing. Of all levels mentioned, findings are worse on left versus right.     Assessment and Plan    Diagnoses and all orders for this visit:    1. Chronic bilateral low back pain with right-sided sciatica (Primary)  -     Ambulatory Referral to Pain Management    2. Facet arthropathy, lumbar  -     Ambulatory Referral to Pain Management    3. Degenerative disc disease, lumbar  -     Ambulatory Referral to Pain Management    4. Spinal stenosis of lumbar region, unspecified whether neurogenic claudication present  -     Ambulatory Referral to Pain Management      Discussed exam, MRI findings with patient.  He has failed conservative management and his pain is progressively worsening.  Did discuss further nonoperative options such as lumbar epidural injections.  Referral made to Dr. Acosta.  Keep appointment with neurosurgery.  All questions answered to the best of my ability.      Follow Up   Return if symptoms worsen or fail to improve.  Patient was given instructions and counseling regarding his condition or for health maintenance advice. Please see specific information pulled into the AVS if appropriate.

## 2021-05-21 DIAGNOSIS — G89.29 CHRONIC BILATERAL LOW BACK PAIN WITHOUT SCIATICA: ICD-10-CM

## 2021-05-21 DIAGNOSIS — R29.898 WEAKNESS OF BOTH LOWER EXTREMITIES: ICD-10-CM

## 2021-05-21 DIAGNOSIS — G89.29 CHRONIC RADICULAR PAIN OF LOWER BACK: ICD-10-CM

## 2021-05-21 DIAGNOSIS — M54.16 CHRONIC RADICULAR PAIN OF LOWER BACK: ICD-10-CM

## 2021-05-21 DIAGNOSIS — M54.50 CHRONIC BILATERAL LOW BACK PAIN WITHOUT SCIATICA: ICD-10-CM

## 2021-05-21 RX ORDER — HYDROCODONE BITARTRATE AND ACETAMINOPHEN 5; 325 MG/1; MG/1
1 TABLET ORAL 2 TIMES DAILY PRN
Qty: 60 TABLET | Refills: 0 | Status: SHIPPED | OUTPATIENT
Start: 2021-05-21 | End: 2021-06-02 | Stop reason: SDUPTHER

## 2021-05-21 NOTE — TELEPHONE ENCOUNTER
Can we check if he has  been able to fill this?     If he has not I can send an updated script for the 60 tabs

## 2021-05-21 NOTE — TELEPHONE ENCOUNTER
Medication needed a PA, I have completed on cover my meds today. Patient can only get a 7 day supply, he currently only has 1 pill left.

## 2021-06-02 DIAGNOSIS — G89.29 CHRONIC RADICULAR PAIN OF LOWER BACK: ICD-10-CM

## 2021-06-02 DIAGNOSIS — M54.16 CHRONIC RADICULAR PAIN OF LOWER BACK: ICD-10-CM

## 2021-06-02 DIAGNOSIS — M54.50 CHRONIC BILATERAL LOW BACK PAIN WITHOUT SCIATICA: ICD-10-CM

## 2021-06-02 DIAGNOSIS — R29.898 WEAKNESS OF BOTH LOWER EXTREMITIES: ICD-10-CM

## 2021-06-02 DIAGNOSIS — G89.29 CHRONIC BILATERAL LOW BACK PAIN WITHOUT SCIATICA: ICD-10-CM

## 2021-06-02 RX ORDER — HYDROCODONE BITARTRATE AND ACETAMINOPHEN 5; 325 MG/1; MG/1
1 TABLET ORAL 2 TIMES DAILY PRN
Qty: 7 TABLET | Refills: 0 | Status: SHIPPED | OUTPATIENT
Start: 2021-06-02 | End: 2021-06-18

## 2021-06-02 NOTE — TELEPHONE ENCOUNTER
SW pt he states he see's neuro doctor on Friday and goes to pain management on 08.02.21.     He asked if we could try maybe #7 pills at a time to see if that will be cheaper.     PLEASE ADVISE

## 2021-06-02 NOTE — TELEPHONE ENCOUNTER
-Unable to do weekly scripts for 7 pills at a time. Can fill one more script to last until  he sees Neuro Dr on Friday    -Then we can talk about alternative options that insurance might cover like tramadol etc

## 2021-06-02 NOTE — TELEPHONE ENCOUNTER
-Does he have an appointment scheduled with Pain management? Would get on their cancellation list to discuss other pain control options like injections    -Did a prior auth get him any insurance coverage for this medication? Will not be able to write a weekly script long term.

## 2021-06-03 DIAGNOSIS — G89.29 CHRONIC BILATERAL LOW BACK PAIN WITHOUT SCIATICA: ICD-10-CM

## 2021-06-03 DIAGNOSIS — M54.50 CHRONIC BILATERAL LOW BACK PAIN WITHOUT SCIATICA: ICD-10-CM

## 2021-06-03 RX ORDER — NAPROXEN 500 MG/1
500 TABLET ORAL 2 TIMES DAILY WITH MEALS
Qty: 30 TABLET | Refills: 0 | Status: SHIPPED | OUTPATIENT
Start: 2021-06-03 | End: 2021-06-10

## 2021-06-04 ENCOUNTER — OFFICE VISIT (OUTPATIENT)
Dept: NEUROSURGERY | Facility: CLINIC | Age: 56
End: 2021-06-04

## 2021-06-04 VITALS
TEMPERATURE: 99 F | WEIGHT: 285 LBS | HEIGHT: 73 IN | SYSTOLIC BLOOD PRESSURE: 146 MMHG | BODY MASS INDEX: 37.77 KG/M2 | DIASTOLIC BLOOD PRESSURE: 92 MMHG

## 2021-06-04 DIAGNOSIS — M48.062 SPINAL STENOSIS, LUMBAR REGION, WITH NEUROGENIC CLAUDICATION: Primary | ICD-10-CM

## 2021-06-04 PROCEDURE — 99204 OFFICE O/P NEW MOD 45 MIN: CPT | Performed by: NEUROLOGICAL SURGERY

## 2021-06-04 RX ORDER — ACETAMINOPHEN 500 MG
500 TABLET ORAL EVERY 6 HOURS PRN
COMMUNITY
End: 2022-02-01

## 2021-06-04 RX ORDER — CEFAZOLIN SODIUM IN 0.9 % NACL 3 G/100 ML
3 INTRAVENOUS SOLUTION, PIGGYBACK (ML) INTRAVENOUS ONCE
Status: CANCELLED | OUTPATIENT
Start: 2021-06-28 | End: 2021-06-04

## 2021-06-07 ENCOUNTER — TELEPHONE (OUTPATIENT)
Dept: NEUROSURGERY | Facility: CLINIC | Age: 56
End: 2021-06-07

## 2021-06-07 DIAGNOSIS — G89.29 CHRONIC BILATERAL LOW BACK PAIN WITHOUT SCIATICA: ICD-10-CM

## 2021-06-07 DIAGNOSIS — M54.50 CHRONIC BILATERAL LOW BACK PAIN WITHOUT SCIATICA: ICD-10-CM

## 2021-06-07 DIAGNOSIS — M54.16 CHRONIC RADICULAR PAIN OF LOWER BACK: ICD-10-CM

## 2021-06-07 DIAGNOSIS — R29.898 WEAKNESS OF BOTH LOWER EXTREMITIES: ICD-10-CM

## 2021-06-07 DIAGNOSIS — G89.29 CHRONIC RADICULAR PAIN OF LOWER BACK: ICD-10-CM

## 2021-06-07 RX ORDER — HYDROCODONE BITARTRATE AND ACETAMINOPHEN 5; 325 MG/1; MG/1
1 TABLET ORAL 2 TIMES DAILY PRN
Qty: 7 TABLET | Refills: 0 | Status: CANCELLED | OUTPATIENT
Start: 2021-06-07

## 2021-06-07 NOTE — TELEPHONE ENCOUNTER
Patient called to get his surgery with Dr. Costello scheduled. He would like it around the end of June. Message to call the patient was given to Elvira.

## 2021-06-08 DIAGNOSIS — M48.062 SPINAL STENOSIS, LUMBAR REGION, WITH NEUROGENIC CLAUDICATION: Primary | ICD-10-CM

## 2021-06-08 RX ORDER — PREDNISONE 20 MG/1
40 TABLET ORAL DAILY
Qty: 10 TABLET | Refills: 0 | Status: SHIPPED | OUTPATIENT
Start: 2021-06-08 | End: 2021-06-13

## 2021-06-09 DIAGNOSIS — M54.16 CHRONIC RADICULAR PAIN OF LOWER BACK: ICD-10-CM

## 2021-06-09 DIAGNOSIS — G89.4 CHRONIC PAIN SYNDROME: ICD-10-CM

## 2021-06-09 DIAGNOSIS — G89.29 CHRONIC RADICULAR PAIN OF LOWER BACK: ICD-10-CM

## 2021-06-09 DIAGNOSIS — M48.062 SPINAL STENOSIS OF LUMBAR REGION WITH NEUROGENIC CLAUDICATION: Primary | ICD-10-CM

## 2021-06-09 DIAGNOSIS — R29.898 WEAKNESS OF BOTH LOWER EXTREMITIES: ICD-10-CM

## 2021-06-09 RX ORDER — TRAMADOL HYDROCHLORIDE 50 MG/1
50 TABLET ORAL EVERY 8 HOURS PRN
Qty: 90 TABLET | Refills: 0 | Status: SHIPPED | OUTPATIENT
Start: 2021-06-09 | End: 2021-06-28 | Stop reason: HOSPADM

## 2021-06-15 ENCOUNTER — TRANSCRIBE ORDERS (OUTPATIENT)
Dept: PREADMISSION TESTING | Facility: HOSPITAL | Age: 56
End: 2021-06-15

## 2021-06-15 DIAGNOSIS — Z01.818 OTHER SPECIFIED PRE-OPERATIVE EXAMINATION: Primary | ICD-10-CM

## 2021-06-18 ENCOUNTER — PRE-ADMISSION TESTING (OUTPATIENT)
Dept: PREADMISSION TESTING | Facility: HOSPITAL | Age: 56
End: 2021-06-18

## 2021-06-18 VITALS
DIASTOLIC BLOOD PRESSURE: 91 MMHG | TEMPERATURE: 98.3 F | SYSTOLIC BLOOD PRESSURE: 146 MMHG | HEART RATE: 80 BPM | OXYGEN SATURATION: 97 % | RESPIRATION RATE: 16 BRPM | WEIGHT: 283 LBS | HEIGHT: 73 IN | BODY MASS INDEX: 37.51 KG/M2

## 2021-06-18 LAB
ANION GAP SERPL CALCULATED.3IONS-SCNC: 7.1 MMOL/L (ref 5–15)
BUN SERPL-MCNC: 15 MG/DL (ref 6–20)
BUN/CREAT SERPL: 13.3 (ref 7–25)
CALCIUM SPEC-SCNC: 9.1 MG/DL (ref 8.6–10.5)
CHLORIDE SERPL-SCNC: 105 MMOL/L (ref 98–107)
CO2 SERPL-SCNC: 28.9 MMOL/L (ref 22–29)
CREAT SERPL-MCNC: 1.13 MG/DL (ref 0.76–1.27)
DEPRECATED RDW RBC AUTO: 44.3 FL (ref 37–54)
ERYTHROCYTE [DISTWIDTH] IN BLOOD BY AUTOMATED COUNT: 13.1 % (ref 12.3–15.4)
GFR SERPL CREATININE-BSD FRML MDRD: 67 ML/MIN/1.73
GLUCOSE SERPL-MCNC: 93 MG/DL (ref 65–99)
HCT VFR BLD AUTO: 44 % (ref 37.5–51)
HGB BLD-MCNC: 14.8 G/DL (ref 13–17.7)
MCH RBC QN AUTO: 30.8 PG (ref 26.6–33)
MCHC RBC AUTO-ENTMCNC: 33.6 G/DL (ref 31.5–35.7)
MCV RBC AUTO: 91.5 FL (ref 79–97)
PLATELET # BLD AUTO: 241 10*3/MM3 (ref 140–450)
PMV BLD AUTO: 11.2 FL (ref 6–12)
POTASSIUM SERPL-SCNC: 3.4 MMOL/L (ref 3.5–5.2)
RBC # BLD AUTO: 4.81 10*6/MM3 (ref 4.14–5.8)
SODIUM SERPL-SCNC: 141 MMOL/L (ref 136–145)
WBC # BLD AUTO: 6.55 10*3/MM3 (ref 3.4–10.8)

## 2021-06-18 PROCEDURE — 36415 COLL VENOUS BLD VENIPUNCTURE: CPT

## 2021-06-18 PROCEDURE — 93010 ELECTROCARDIOGRAM REPORT: CPT | Performed by: INTERNAL MEDICINE

## 2021-06-18 PROCEDURE — 80048 BASIC METABOLIC PNL TOTAL CA: CPT

## 2021-06-18 PROCEDURE — 85027 COMPLETE CBC AUTOMATED: CPT

## 2021-06-18 PROCEDURE — 93005 ELECTROCARDIOGRAM TRACING: CPT

## 2021-06-18 RX ORDER — NAPROXEN SODIUM 220 MG
220 TABLET ORAL 2 TIMES DAILY PRN
COMMUNITY
End: 2021-07-13

## 2021-06-18 NOTE — DISCHARGE INSTRUCTIONS
Take the following medications the morning of surgery:    NONE    CALL OFFICE FOR ARRIVAL TIME      If you are on prescription narcotic pain medication to control your pain you may also take that medication the morning of surgery.    General Instructions:  • Do not eat solid food after midnight the night before surgery.  • You may drink clear liquids day of surgery but must stop at least one hour before your hospital arrival time.  • It is beneficial for you to have a clear drink that contains carbohydrates the day of surgery.  We suggest a 12 to 20 ounce bottle of Gatorade or Powerade for non-diabetic patients or a 12 to 20 ounce bottle of G2 or Powerade Zero for diabetic patients. (Pediatric patients, are not advised to drink a 12 to 20 ounce carbohydrate drink)    Clear liquids are liquids you can see through.  Nothing red in color.     Plain water                               Sports drinks  Sodas                                   Gelatin (Jell-O)  Fruit juices without pulp such as white grape juice and apple juice  Popsicles that contain no fruit or yogurt  Tea or coffee (no cream or milk added)  Gatorade / Powerade  G2 / Powerade Zero    • Infants may have breast milk up to four hours before surgery.  • Infants drinking formula may drink formula up to six hours before surgery.   • Patients who avoid smoking, chewing tobacco and alcohol for 4 weeks prior to surgery have a reduced risk of post-operative complications.  Quit smoking as many days before surgery as you can.  • Do not smoke, use chewing tobacco or drink alcohol the day of surgery.   • If applicable bring your C-PAP/ BI-PAP machine.  • Bring any papers given to you in the doctor’s office.  • Wear clean comfortable clothes.  • Do not wear contact lenses, false eyelashes or make-up.  Bring a case for your glasses.   • Bring crutches or walker if applicable.  • Remove all piercings.  Leave jewelry and any other valuables at home.  • Hair extensions with  metal clips must be removed prior to surgery.  • The Pre-Admission Testing nurse will instruct you to bring medications if unable to obtain an accurate list in Pre-Admission Testing.        If you were given a blood bank ID arm band remember to bring it with you the day of surgery.    Preventing a Surgical Site Infection:  • For 2 to 3 days before surgery, avoid shaving with a razor because the razor can irritate skin and make it easier to develop an infection.    • Any areas of open skin can increase the risk of a post-operative wound infection by allowing bacteria to enter and travel throughout the body.  Notify your surgeon if you have any skin wounds / rashes even if it is not near the expected surgical site.  The area will need assessed to determine if surgery should be delayed until it is healed.  • The night prior to surgery shower using a fresh bar of anti-bacterial soap (such as Dial) and clean washcloth.  Sleep in a clean bed with clean clothing.  Do not allow pets to sleep with you.  • Shower on the morning of surgery using a fresh bar of anti-bacterial soap (such as Dial) and clean washcloth.  Dry with a clean towel and dress in clean clothing.  • Ask your surgeon if you will be receiving antibiotics prior to surgery.  • Make sure you, your family, and all healthcare providers clean their hands with soap and water or an alcohol based hand  before caring for you or your wound.    Day of surgery:  Your arrival time is approximately two hours before your scheduled surgery time.  Upon arrival, a Pre-op nurse and Anesthesiologist will review your health history, obtain vital signs, and answer questions you may have.  The only belongings needed at this time will be a list of your home medications and if applicable your C-PAP/BI-PAP machine.  A Pre-op nurse will start an IV and you may receive medication in preparation for surgery, including something to help you relax.     Please be aware that surgery  does come with discomfort.  We want to make every effort to control your discomfort so please discuss any uncontrolled symptoms with your nurse.   Your doctor will most likely have prescribed pain medications.      If you are going home after surgery you will receive individualized written care instructions before being discharged.  A responsible adult must drive you to and from the hospital on the day of your surgery and stay with you for 24 hours.  Discharge prescriptions can be filled by the hospital pharmacy during regular pharmacy hours.  If you are having surgery late in the day/evening your prescription may be e-prescribed to your pharmacy.  Please verify your pharmacy hours or chose a 24 hour pharmacy to avoid not having access to your prescription because your pharmacy has closed for the day.    If you are staying overnight following surgery, you will be transported to your hospital room following the recovery period.  [facility] has all private rooms.    If you have any questions please call Pre-Admission Testing at [phone].  Deductibles and co-payments are collected on the day of service. Please be prepared to pay the required co-pay, deductible or deposit on the day of service as defined by your plan.    Patient Education for Self-Quarantine Process    Following your COVID testing, we strongly recommend that you do not leave your home after you have been tested for COVID except to get medical care. This includes not going to work, school or to public areas.  If this is not possible for you to do please limit your activities to only required outings.  Be sure to wear a mask when you are with other people, practice social distancing and wash your hands frequently.      The following items provide additional details to keep you safe.  • Wash your hands with soap and water frequently for at least 20 seconds.   • Avoid touching your eyes, nose and mouth with unwashed hands.  • Do not  share anything - utensils, towels, food from the same bowl.   • Have your own utensils, drinking glass, dishes, towels and bedding.   • Do not have visitors.   • Do use FaceTime to stay in touch with family and friends.  • You should stay in a specific room away from others if possible.   • Stay at least 6 feet away from others in the home if you cannot have a dedicated room to yourself.   • Do not snuggle with your pet. While the CDC says there is no evidence that pets can spread COVID-19 or be infected from humans, it is probably best to avoid “petting, snuggling, being kissed or licked and sharing food (during self-quarantine)”, according to the CDC.   • Sanitize household surfaces daily. Include all high touch areas (door handles, light switches, phones, countertops, etc.)  • Do not share a bathroom with others, if possible.   • Wear a mask around others in your home if you are unable to stay in a separate room or 6 feet apart. If  you are unable to wear a mask, have your family member wear a mask if they must be within 6 feet of you.   Call your surgeon immediately if you experience any of the following symptoms:  • Sore Throat  • Shortness of Breath or difficulty breathing  • Cough  • Chills  • Body soreness or muscle pain  • Headache  • Fever  • New loss of taste or smell  • Do not arrive for your surgery ill.  Your procedure will need to be rescheduled to another time.  You will need to call your physician before the day of surgery to avoid any unnecessary exposure to hospital staff as well as other patients.

## 2021-06-19 LAB — QT INTERVAL: 381 MS

## 2021-06-25 ENCOUNTER — LAB (OUTPATIENT)
Dept: LAB | Facility: HOSPITAL | Age: 56
End: 2021-06-25

## 2021-06-25 DIAGNOSIS — Z01.818 OTHER SPECIFIED PRE-OPERATIVE EXAMINATION: ICD-10-CM

## 2021-06-25 LAB — SARS-COV-2 ORF1AB RESP QL NAA+PROBE: NOT DETECTED

## 2021-06-25 PROCEDURE — C9803 HOPD COVID-19 SPEC COLLECT: HCPCS

## 2021-06-25 PROCEDURE — U0004 COV-19 TEST NON-CDC HGH THRU: HCPCS

## 2021-06-28 ENCOUNTER — HOSPITAL ENCOUNTER (OUTPATIENT)
Facility: HOSPITAL | Age: 56
Setting detail: HOSPITAL OUTPATIENT SURGERY
Discharge: HOME | End: 2021-06-28
Attending: NEUROLOGICAL SURGERY | Admitting: NEUROLOGICAL SURGERY

## 2021-06-28 ENCOUNTER — APPOINTMENT (OUTPATIENT)
Dept: GENERAL RADIOLOGY | Facility: HOSPITAL | Age: 56
End: 2021-06-28

## 2021-06-28 ENCOUNTER — ANESTHESIA EVENT (OUTPATIENT)
Dept: PERIOP | Facility: HOSPITAL | Age: 56
End: 2021-06-28

## 2021-06-28 ENCOUNTER — ANESTHESIA (OUTPATIENT)
Dept: PERIOP | Facility: HOSPITAL | Age: 56
End: 2021-06-28

## 2021-06-28 VITALS
RESPIRATION RATE: 20 BRPM | SYSTOLIC BLOOD PRESSURE: 157 MMHG | BODY MASS INDEX: 37.48 KG/M2 | DIASTOLIC BLOOD PRESSURE: 89 MMHG | WEIGHT: 282.8 LBS | OXYGEN SATURATION: 97 % | HEIGHT: 73 IN | HEART RATE: 87 BPM | TEMPERATURE: 99.2 F

## 2021-06-28 DIAGNOSIS — M48.062 SPINAL STENOSIS, LUMBAR REGION, WITH NEUROGENIC CLAUDICATION: ICD-10-CM

## 2021-06-28 PROCEDURE — 25010000002 NEOSTIGMINE 5 MG/10ML SOLUTION: Performed by: NURSE ANESTHETIST, CERTIFIED REGISTERED

## 2021-06-28 PROCEDURE — 63047 LAM FACETEC & FORAMOT LUMBAR: CPT | Performed by: SPECIALIST/TECHNOLOGIST, OTHER

## 2021-06-28 PROCEDURE — 25010000002 ONDANSETRON PER 1 MG: Performed by: NURSE ANESTHETIST, CERTIFIED REGISTERED

## 2021-06-28 PROCEDURE — 63047 LAM FACETEC & FORAMOT LUMBAR: CPT | Performed by: NEUROLOGICAL SURGERY

## 2021-06-28 PROCEDURE — 25010000002 PHENYLEPHRINE PER 1 ML: Performed by: NURSE ANESTHETIST, CERTIFIED REGISTERED

## 2021-06-28 PROCEDURE — 72020 X-RAY EXAM OF SPINE 1 VIEW: CPT

## 2021-06-28 PROCEDURE — 25010000002 MIDAZOLAM PER 1 MG: Performed by: ANESTHESIOLOGY

## 2021-06-28 PROCEDURE — 25010000002 FENTANYL CITRATE (PF) 50 MCG/ML SOLUTION: Performed by: NURSE ANESTHETIST, CERTIFIED REGISTERED

## 2021-06-28 PROCEDURE — C1889 IMPLANT/INSERT DEVICE, NOC: HCPCS | Performed by: NEUROLOGICAL SURGERY

## 2021-06-28 PROCEDURE — 25010000002 HYDROMORPHONE PER 4 MG: Performed by: NURSE ANESTHETIST, CERTIFIED REGISTERED

## 2021-06-28 PROCEDURE — 25010000002 DEXAMETHASONE PER 1 MG: Performed by: NURSE ANESTHETIST, CERTIFIED REGISTERED

## 2021-06-28 PROCEDURE — 25010000002 CEFAZOLIN PER 500 MG: Performed by: NEUROLOGICAL SURGERY

## 2021-06-28 PROCEDURE — 25010000002 KETOROLAC TROMETHAMINE PER 15 MG: Performed by: NEUROLOGICAL SURGERY

## 2021-06-28 PROCEDURE — 25010000002 SUCCINYLCHOLINE PER 20 MG: Performed by: NURSE ANESTHETIST, CERTIFIED REGISTERED

## 2021-06-28 PROCEDURE — 25010000002 PROPOFOL 10 MG/ML EMULSION: Performed by: NURSE ANESTHETIST, CERTIFIED REGISTERED

## 2021-06-28 DEVICE — FLOSEAL HEMOSTATIC MATRIX, 10ML
Type: IMPLANTABLE DEVICE | Site: SPINE LUMBAR | Status: FUNCTIONAL
Brand: FLOSEAL HEMOSTATIC MATRIX

## 2021-06-28 DEVICE — BONE WAX
Type: IMPLANTABLE DEVICE | Site: SPINE LUMBAR | Status: FUNCTIONAL
Brand: ETHICON

## 2021-06-28 RX ORDER — BUPIVACAINE HYDROCHLORIDE AND EPINEPHRINE 5; 5 MG/ML; UG/ML
INJECTION, SOLUTION PERINEURAL AS NEEDED
Status: DISCONTINUED | OUTPATIENT
Start: 2021-06-28 | End: 2021-06-28 | Stop reason: HOSPADM

## 2021-06-28 RX ORDER — MORPHINE SULFATE 2 MG/ML
2 INJECTION, SOLUTION INTRAMUSCULAR; INTRAVENOUS EVERY 4 HOURS PRN
Status: CANCELLED | OUTPATIENT
Start: 2021-06-28 | End: 2021-07-08

## 2021-06-28 RX ORDER — KETOROLAC TROMETHAMINE 15 MG/ML
15 INJECTION, SOLUTION INTRAMUSCULAR; INTRAVENOUS EVERY 6 HOURS PRN
Status: DISCONTINUED | OUTPATIENT
Start: 2021-06-28 | End: 2021-06-28 | Stop reason: HOSPADM

## 2021-06-28 RX ORDER — CYCLOBENZAPRINE HCL 10 MG
10 TABLET ORAL 3 TIMES DAILY PRN
Qty: 60 TABLET | Refills: 3 | Status: SHIPPED | OUTPATIENT
Start: 2021-06-28 | End: 2021-09-24

## 2021-06-28 RX ORDER — FENTANYL CITRATE 50 UG/ML
50 INJECTION, SOLUTION INTRAMUSCULAR; INTRAVENOUS
Status: DISCONTINUED | OUTPATIENT
Start: 2021-06-28 | End: 2021-06-28 | Stop reason: HOSPADM

## 2021-06-28 RX ORDER — NEOSTIGMINE METHYLSULFATE 0.5 MG/ML
INJECTION, SOLUTION INTRAVENOUS AS NEEDED
Status: DISCONTINUED | OUTPATIENT
Start: 2021-06-28 | End: 2021-06-28 | Stop reason: SURG

## 2021-06-28 RX ORDER — MAGNESIUM HYDROXIDE 1200 MG/15ML
LIQUID ORAL AS NEEDED
Status: DISCONTINUED | OUTPATIENT
Start: 2021-06-28 | End: 2021-06-28 | Stop reason: HOSPADM

## 2021-06-28 RX ORDER — HYDROCODONE BITARTRATE AND ACETAMINOPHEN 7.5; 325 MG/1; MG/1
1 TABLET ORAL EVERY 6 HOURS PRN
Qty: 40 TABLET | Refills: 0 | Status: SHIPPED | OUTPATIENT
Start: 2021-06-28 | End: 2021-08-10

## 2021-06-28 RX ORDER — HYDROCODONE BITARTRATE AND ACETAMINOPHEN 7.5; 325 MG/1; MG/1
1 TABLET ORAL EVERY 4 HOURS PRN
Status: CANCELLED | OUTPATIENT
Start: 2021-06-28 | End: 2021-07-08

## 2021-06-28 RX ORDER — PROPOFOL 10 MG/ML
VIAL (ML) INTRAVENOUS AS NEEDED
Status: DISCONTINUED | OUTPATIENT
Start: 2021-06-28 | End: 2021-06-28 | Stop reason: SURG

## 2021-06-28 RX ORDER — EPHEDRINE SULFATE 50 MG/ML
5 INJECTION, SOLUTION INTRAVENOUS ONCE AS NEEDED
Status: DISCONTINUED | OUTPATIENT
Start: 2021-06-28 | End: 2021-06-28 | Stop reason: HOSPADM

## 2021-06-28 RX ORDER — HYDROMORPHONE HCL 110MG/55ML
PATIENT CONTROLLED ANALGESIA SYRINGE INTRAVENOUS AS NEEDED
Status: DISCONTINUED | OUTPATIENT
Start: 2021-06-28 | End: 2021-06-28 | Stop reason: SURG

## 2021-06-28 RX ORDER — SODIUM CHLORIDE, SODIUM LACTATE, POTASSIUM CHLORIDE, CALCIUM CHLORIDE 600; 310; 30; 20 MG/100ML; MG/100ML; MG/100ML; MG/100ML
9 INJECTION, SOLUTION INTRAVENOUS CONTINUOUS
Status: DISCONTINUED | OUTPATIENT
Start: 2021-06-28 | End: 2021-06-28 | Stop reason: HOSPADM

## 2021-06-28 RX ORDER — MIDAZOLAM HYDROCHLORIDE 1 MG/ML
1 INJECTION INTRAMUSCULAR; INTRAVENOUS
Status: DISCONTINUED | OUTPATIENT
Start: 2021-06-28 | End: 2021-06-28 | Stop reason: HOSPADM

## 2021-06-28 RX ORDER — FENTANYL CITRATE 50 UG/ML
INJECTION, SOLUTION INTRAMUSCULAR; INTRAVENOUS AS NEEDED
Status: DISCONTINUED | OUTPATIENT
Start: 2021-06-28 | End: 2021-06-28 | Stop reason: SURG

## 2021-06-28 RX ORDER — NALOXONE HCL 0.4 MG/ML
0.4 VIAL (ML) INJECTION
Status: CANCELLED | OUTPATIENT
Start: 2021-06-28

## 2021-06-28 RX ORDER — HYDROCODONE BITARTRATE AND ACETAMINOPHEN 7.5; 325 MG/1; MG/1
2 TABLET ORAL EVERY 4 HOURS PRN
Status: CANCELLED | OUTPATIENT
Start: 2021-06-28 | End: 2021-07-08

## 2021-06-28 RX ORDER — NALOXONE HCL 0.4 MG/ML
0.2 VIAL (ML) INJECTION AS NEEDED
Status: DISCONTINUED | OUTPATIENT
Start: 2021-06-28 | End: 2021-06-28 | Stop reason: HOSPADM

## 2021-06-28 RX ORDER — SODIUM CHLORIDE 0.9 % (FLUSH) 0.9 %
10 SYRINGE (ML) INJECTION AS NEEDED
Status: CANCELLED | OUTPATIENT
Start: 2021-06-28

## 2021-06-28 RX ORDER — LIDOCAINE HYDROCHLORIDE 10 MG/ML
0.5 INJECTION, SOLUTION EPIDURAL; INFILTRATION; INTRACAUDAL; PERINEURAL ONCE AS NEEDED
Status: DISCONTINUED | OUTPATIENT
Start: 2021-06-28 | End: 2021-06-28 | Stop reason: HOSPADM

## 2021-06-28 RX ORDER — GLYCOPYRROLATE 0.2 MG/ML
INJECTION INTRAMUSCULAR; INTRAVENOUS AS NEEDED
Status: DISCONTINUED | OUTPATIENT
Start: 2021-06-28 | End: 2021-06-28 | Stop reason: SURG

## 2021-06-28 RX ORDER — ONDANSETRON 2 MG/ML
INJECTION INTRAMUSCULAR; INTRAVENOUS AS NEEDED
Status: DISCONTINUED | OUTPATIENT
Start: 2021-06-28 | End: 2021-06-28 | Stop reason: SURG

## 2021-06-28 RX ORDER — SODIUM CHLORIDE, SODIUM LACTATE, POTASSIUM CHLORIDE, CALCIUM CHLORIDE 600; 310; 30; 20 MG/100ML; MG/100ML; MG/100ML; MG/100ML
INJECTION, SOLUTION INTRAVENOUS CONTINUOUS PRN
Status: DISCONTINUED | OUTPATIENT
Start: 2021-06-28 | End: 2021-06-28 | Stop reason: SURG

## 2021-06-28 RX ORDER — PROMETHAZINE HYDROCHLORIDE 25 MG/1
25 TABLET ORAL ONCE AS NEEDED
Status: DISCONTINUED | OUTPATIENT
Start: 2021-06-28 | End: 2021-06-28 | Stop reason: HOSPADM

## 2021-06-28 RX ORDER — ACETAMINOPHEN 325 MG/1
650 TABLET ORAL EVERY 4 HOURS PRN
Status: CANCELLED | OUTPATIENT
Start: 2021-06-28

## 2021-06-28 RX ORDER — SODIUM CHLORIDE 0.9 % (FLUSH) 0.9 %
3 SYRINGE (ML) INJECTION EVERY 12 HOURS SCHEDULED
Status: DISCONTINUED | OUTPATIENT
Start: 2021-06-28 | End: 2021-06-28 | Stop reason: HOSPADM

## 2021-06-28 RX ORDER — LABETALOL HYDROCHLORIDE 5 MG/ML
5 INJECTION, SOLUTION INTRAVENOUS
Status: DISCONTINUED | OUTPATIENT
Start: 2021-06-28 | End: 2021-06-28 | Stop reason: HOSPADM

## 2021-06-28 RX ORDER — SODIUM CHLORIDE AND POTASSIUM CHLORIDE 150; 900 MG/100ML; MG/100ML
75 INJECTION, SOLUTION INTRAVENOUS CONTINUOUS
Status: CANCELLED | OUTPATIENT
Start: 2021-06-28

## 2021-06-28 RX ORDER — DIPHENHYDRAMINE HCL 25 MG
25 CAPSULE ORAL
Status: DISCONTINUED | OUTPATIENT
Start: 2021-06-28 | End: 2021-06-28 | Stop reason: HOSPADM

## 2021-06-28 RX ORDER — CYCLOBENZAPRINE HCL 10 MG
10 TABLET ORAL 3 TIMES DAILY PRN
Status: CANCELLED | OUTPATIENT
Start: 2021-06-28

## 2021-06-28 RX ORDER — DIPHENHYDRAMINE HYDROCHLORIDE 50 MG/ML
12.5 INJECTION INTRAMUSCULAR; INTRAVENOUS
Status: DISCONTINUED | OUTPATIENT
Start: 2021-06-28 | End: 2021-06-28 | Stop reason: HOSPADM

## 2021-06-28 RX ORDER — SODIUM CHLORIDE 0.9 % (FLUSH) 0.9 %
3-10 SYRINGE (ML) INJECTION AS NEEDED
Status: DISCONTINUED | OUTPATIENT
Start: 2021-06-28 | End: 2021-06-28 | Stop reason: HOSPADM

## 2021-06-28 RX ORDER — OXYCODONE AND ACETAMINOPHEN 10; 325 MG/1; MG/1
1 TABLET ORAL EVERY 4 HOURS PRN
Status: DISCONTINUED | OUTPATIENT
Start: 2021-06-28 | End: 2021-06-28 | Stop reason: HOSPADM

## 2021-06-28 RX ORDER — HYDROMORPHONE HYDROCHLORIDE 1 MG/ML
0.5 INJECTION, SOLUTION INTRAMUSCULAR; INTRAVENOUS; SUBCUTANEOUS
Status: DISCONTINUED | OUTPATIENT
Start: 2021-06-28 | End: 2021-06-28 | Stop reason: HOSPADM

## 2021-06-28 RX ORDER — HYDRALAZINE HYDROCHLORIDE 20 MG/ML
5 INJECTION INTRAMUSCULAR; INTRAVENOUS
Status: DISCONTINUED | OUTPATIENT
Start: 2021-06-28 | End: 2021-06-28 | Stop reason: HOSPADM

## 2021-06-28 RX ORDER — ONDANSETRON 2 MG/ML
4 INJECTION INTRAMUSCULAR; INTRAVENOUS EVERY 6 HOURS PRN
Status: CANCELLED | OUTPATIENT
Start: 2021-06-28

## 2021-06-28 RX ORDER — DEXAMETHASONE SODIUM PHOSPHATE 10 MG/ML
INJECTION INTRAMUSCULAR; INTRAVENOUS AS NEEDED
Status: DISCONTINUED | OUTPATIENT
Start: 2021-06-28 | End: 2021-06-28 | Stop reason: SURG

## 2021-06-28 RX ORDER — ONDANSETRON 4 MG/1
4 TABLET, FILM COATED ORAL EVERY 6 HOURS PRN
Status: CANCELLED | OUTPATIENT
Start: 2021-06-28

## 2021-06-28 RX ORDER — SUCCINYLCHOLINE CHLORIDE 20 MG/ML
INJECTION INTRAMUSCULAR; INTRAVENOUS AS NEEDED
Status: DISCONTINUED | OUTPATIENT
Start: 2021-06-28 | End: 2021-06-28 | Stop reason: SURG

## 2021-06-28 RX ORDER — SODIUM CHLORIDE 0.9 % (FLUSH) 0.9 %
3 SYRINGE (ML) INJECTION EVERY 12 HOURS SCHEDULED
Status: CANCELLED | OUTPATIENT
Start: 2021-06-28

## 2021-06-28 RX ORDER — ONDANSETRON 2 MG/ML
4 INJECTION INTRAMUSCULAR; INTRAVENOUS ONCE AS NEEDED
Status: DISCONTINUED | OUTPATIENT
Start: 2021-06-28 | End: 2021-06-28 | Stop reason: HOSPADM

## 2021-06-28 RX ORDER — LIDOCAINE HYDROCHLORIDE 20 MG/ML
INJECTION, SOLUTION INFILTRATION; PERINEURAL AS NEEDED
Status: DISCONTINUED | OUTPATIENT
Start: 2021-06-28 | End: 2021-06-28 | Stop reason: SURG

## 2021-06-28 RX ORDER — ROCURONIUM BROMIDE 10 MG/ML
INJECTION, SOLUTION INTRAVENOUS AS NEEDED
Status: DISCONTINUED | OUTPATIENT
Start: 2021-06-28 | End: 2021-06-28 | Stop reason: SURG

## 2021-06-28 RX ORDER — PROMETHAZINE HYDROCHLORIDE 25 MG/1
25 SUPPOSITORY RECTAL ONCE AS NEEDED
Status: DISCONTINUED | OUTPATIENT
Start: 2021-06-28 | End: 2021-06-28 | Stop reason: HOSPADM

## 2021-06-28 RX ORDER — KETAMINE HYDROCHLORIDE 10 MG/ML
INJECTION INTRAMUSCULAR; INTRAVENOUS AS NEEDED
Status: DISCONTINUED | OUTPATIENT
Start: 2021-06-28 | End: 2021-06-28 | Stop reason: SURG

## 2021-06-28 RX ORDER — CEFAZOLIN SODIUM IN 0.9 % NACL 3 G/100 ML
3 INTRAVENOUS SOLUTION, PIGGYBACK (ML) INTRAVENOUS ONCE
Status: COMPLETED | OUTPATIENT
Start: 2021-06-28 | End: 2021-06-28

## 2021-06-28 RX ORDER — FAMOTIDINE 10 MG/ML
20 INJECTION, SOLUTION INTRAVENOUS ONCE
Status: COMPLETED | OUTPATIENT
Start: 2021-06-28 | End: 2021-06-28

## 2021-06-28 RX ADMIN — ROCURONIUM BROMIDE 50 MG: 50 INJECTION INTRAVENOUS at 12:45

## 2021-06-28 RX ADMIN — FAMOTIDINE 20 MG: 10 INJECTION INTRAVENOUS at 12:15

## 2021-06-28 RX ADMIN — PHENYLEPHRINE HYDROCHLORIDE 100 MCG: 10 INJECTION INTRAVENOUS at 13:29

## 2021-06-28 RX ADMIN — NEOSTIGMINE METHYLSULFATE 3 MG: 0.5 INJECTION INTRAVENOUS at 14:28

## 2021-06-28 RX ADMIN — KETAMINE HYDROCHLORIDE 10 MG: 10 INJECTION INTRAMUSCULAR; INTRAVENOUS at 13:50

## 2021-06-28 RX ADMIN — KETOROLAC TROMETHAMINE 15 MG: 15 INJECTION, SOLUTION INTRAMUSCULAR; INTRAVENOUS at 15:09

## 2021-06-28 RX ADMIN — KETAMINE HYDROCHLORIDE 40 MG: 10 INJECTION INTRAMUSCULAR; INTRAVENOUS at 12:45

## 2021-06-28 RX ADMIN — FENTANYL CITRATE 50 MCG: 50 INJECTION, SOLUTION INTRAMUSCULAR; INTRAVENOUS at 16:06

## 2021-06-28 RX ADMIN — ONDANSETRON 4 MG: 2 INJECTION INTRAMUSCULAR; INTRAVENOUS at 13:53

## 2021-06-28 RX ADMIN — FENTANYL CITRATE 50 MCG: 50 INJECTION, SOLUTION INTRAMUSCULAR; INTRAVENOUS at 15:17

## 2021-06-28 RX ADMIN — OXYCODONE HYDROCHLORIDE AND ACETAMINOPHEN 1 TABLET: 10; 325 TABLET ORAL at 15:24

## 2021-06-28 RX ADMIN — MIDAZOLAM 1 MG: 1 INJECTION INTRAMUSCULAR; INTRAVENOUS at 12:15

## 2021-06-28 RX ADMIN — SUCCINYLCHOLINE CHLORIDE 140 MG: 20 INJECTION, SOLUTION INTRAMUSCULAR; INTRAVENOUS; PARENTERAL at 12:41

## 2021-06-28 RX ADMIN — SODIUM CHLORIDE, POTASSIUM CHLORIDE, SODIUM LACTATE AND CALCIUM CHLORIDE 9 ML/HR: 600; 310; 30; 20 INJECTION, SOLUTION INTRAVENOUS at 12:15

## 2021-06-28 RX ADMIN — DEXAMETHASONE SODIUM PHOSPHATE 10 MG: 10 INJECTION INTRAMUSCULAR; INTRAVENOUS at 13:19

## 2021-06-28 RX ADMIN — HYDROMORPHONE HYDROCHLORIDE 0.5 MG: 2 INJECTION, SOLUTION INTRAMUSCULAR; INTRAVENOUS; SUBCUTANEOUS at 14:39

## 2021-06-28 RX ADMIN — CEFAZOLIN SODIUM 3 G: 10 INJECTION, POWDER, FOR SOLUTION INTRAVENOUS at 12:22

## 2021-06-28 RX ADMIN — GLYCOPYRROLATE 0.2 MG: 0.2 INJECTION INTRAMUSCULAR; INTRAVENOUS at 12:45

## 2021-06-28 RX ADMIN — PHENYLEPHRINE HYDROCHLORIDE 100 MCG: 10 INJECTION INTRAVENOUS at 14:15

## 2021-06-28 RX ADMIN — FENTANYL CITRATE 50 MCG: 50 INJECTION, SOLUTION INTRAMUSCULAR; INTRAVENOUS at 15:02

## 2021-06-28 RX ADMIN — FENTANYL CITRATE 100 MCG: 50 INJECTION INTRAMUSCULAR; INTRAVENOUS at 12:41

## 2021-06-28 RX ADMIN — GLYCOPYRROLATE 0.4 MG: 0.2 INJECTION INTRAMUSCULAR; INTRAVENOUS at 14:28

## 2021-06-28 RX ADMIN — SODIUM CHLORIDE, POTASSIUM CHLORIDE, SODIUM LACTATE AND CALCIUM CHLORIDE: 600; 310; 30; 20 INJECTION, SOLUTION INTRAVENOUS at 12:38

## 2021-06-28 RX ADMIN — LIDOCAINE HYDROCHLORIDE 100 MG: 20 INJECTION, SOLUTION INFILTRATION; PERINEURAL at 12:41

## 2021-06-28 RX ADMIN — PROPOFOL 200 MG: 10 INJECTION, EMULSION INTRAVENOUS at 12:41

## 2021-06-28 RX ADMIN — SODIUM CHLORIDE, POTASSIUM CHLORIDE, SODIUM LACTATE AND CALCIUM CHLORIDE: 600; 310; 30; 20 INJECTION, SOLUTION INTRAVENOUS at 13:51

## 2021-06-28 NOTE — ANESTHESIA POSTPROCEDURE EVALUATION
"Patient: Wei Salgado    Procedure Summary     Date: 06/28/21 Room / Location: [facility] OR [address] [place] [address] MAIN OR    Anesthesia Start: 1238 Anesthesia Stop: 1443    Procedure: Lumbar laminectomy lumbar 3 lumbar 4 for bilateral decompression (Bilateral Spine Lumbar) Diagnosis:       Spinal stenosis, lumbar region, with neurogenic claudication      (Spinal stenosis, lumbar region, with neurogenic claudication [M48.062])    Surgeons: Bernabe Costello MD Provider: Tobin Issa MD    Anesthesia Type: general ASA Status: 2          Anesthesia Type: general    Vitals  Vitals Value Taken Time   /98 06/28/21 1530   Temp 37.3 °C (99.2 °F) 06/28/21 1441   Pulse 72 06/28/21 1533   Resp 16 06/28/21 1530   SpO2 92 % 06/28/21 1534   Vitals shown include unvalidated device data.        Post Anesthesia Care and Evaluation    Patient location during evaluation: bedside  Patient participation: complete - patient participated  Level of consciousness: awake  Pain score: 2  Pain management: adequate  Airway patency: patent  Anesthetic complications: No anesthetic complications  PONV Status: none  Cardiovascular status: acceptable  Respiratory status: acceptable  Hydration status: acceptable    Comments: /88 (BP Location: Left arm, Patient Position: Lying)   Pulse 72   Temp 37.3 °C (99.2 °F) (Oral)   Resp 16   Ht 185.4 cm (73\")   Wt 128 kg (282 lb 12.8 oz)   SpO2 95%   BMI 37.31 kg/m²       "

## 2021-06-28 NOTE — ANESTHESIA PROCEDURE NOTES
Airway  Urgency: elective    Date/Time: 6/28/2021 12:42 PM  Airway not difficult    General Information and Staff    Patient location during procedure: OR  CRNA: Yessi Maki CRNA    Indications and Patient Condition  Indications for airway management: airway protection    Preoxygenated: yes  Mask difficulty assessment: 1 - vent by mask    Final Airway Details  Final airway type: endotracheal airway      Successful airway: ETT  Cuffed: yes   Successful intubation technique: direct laryngoscopy  Facilitating devices/methods: Bougie  Endotracheal tube insertion site: oral  Blade: Doron  Blade size: 4  ETT size (mm): 7.5  Cormack-Lehane Classification: grade IIb - view of arytenoids or posterior of glottis only  Placement verified by: chest auscultation and capnometry   Cuff volume (mL): 8  Measured from: lips  Number of attempts at approach: 1    Additional Comments  Atraumatic placement of ETT, dentition unchanged from preop.

## 2021-06-28 NOTE — ANESTHESIA PREPROCEDURE EVALUATION
Anesthesia Evaluation     Patient summary reviewed and Nursing notes reviewed   NPO Solid Status: > 8 hours  NPO Liquid Status: > 2 hours           Airway   Mallampati: II  Neck ROM: full  No difficulty expected  Dental - normal exam     Pulmonary     breath sounds clear to auscultation  (+) sleep apnea,   Cardiovascular     Rhythm: regular    (+) hypertension, hyperlipidemia,       Neuro/Psych  (+) headaches,     GI/Hepatic/Renal/Endo    (+) obesity, morbid obesity,      Musculoskeletal     Abdominal   (+) obese,    Substance History      OB/GYN          Other   arthritis,                      Anesthesia Plan    ASA 2     general   (Prone position discussed)  intravenous induction     Anesthetic plan, all risks, benefits, and alternatives have been provided, discussed and informed consent has been obtained with: patient.

## 2021-07-07 NOTE — PROGRESS NOTES
"Subjective   History of Present Illness: Wei Salgado is a 56 y.o. male is here today for follow-up. Mr. Salgado is 2 weeks and 1 day out from having a L3-4 laminectomy for bilateral decompression on 06-28-21.    Today, Mr. Salgado reports that he is doing well. He has mild back pain and bilateral leg pain.  About 3 days after surgery he developed very severe low back pain and actually was quite concerned but then just as soon as he started to become concerned he got better and he feels better than before surgery now.  The incision looks clean and dry. No redness, swelling or drainage. Patient denies having fever. On a scale of 0-10, the patient rates his pain a 1.    While in the room and during my examination of the patient I wore a mask and eye protection.  I washed my hands before and after this patient encounter.  The patient was also wearing a mask.    History of Present Illness    The following portions of the patient's history were reviewed and updated as appropriate: allergies, current medications, past family history, past medical history, past social history, past surgical history and problem list.    Review of Systems   Respiratory: Negative for chest tightness and shortness of breath.    Cardiovascular: Negative for chest pain.   Musculoskeletal: Positive for back pain.       Objective     Vitals:    07/13/21 1222   BP: 140/88   Temp: 98.7 °F (37.1 °C)   Weight: 128 kg (282 lb)   Height: 185.4 cm (73\")     Body mass index is 37.21 kg/m².      Physical Exam  Neurologic Exam    Physical Exam:    CONSTITUTIONAL:  appears well developed, well-nourished and in no acute distress.    NECK: the neck is supple and symmetric. The trachea is midline with no masses.      PULMONARY: Respiratory effort is normal with no increased work of breathing or signs of respiratory distress.    CARDIOVASCULAR: Pedal pulses are +2/4 bilaterally. Examination of the extremities shows no edema or varicosities.    MUSCULOSKELETAL: " Gait normal    SKIN: The skin is warm, dry and intact.  Lumbar skin incision healed without any Steri-Strips present    NEUROLOGIC:   Normal motor strength noted. Muscle bulk and tone are normal.  Sensory exam is normal to all modalities.  Straight raising test Nick sign is negative  Cortical function is intact and without deficits. Speech is normal.    PSYCHIATRIC: oriented to person, place and time. Patient's mood and affect are normal.    Assessment/Plan     Medical Decision Making:      The patient can now lift up to 25 lbs. and may soak the incision in a bath or pool if desired. They will arrange Physical Therapy 2-3 times per week for 3 weeks to initiate a home exercise plan. It is safe for the patient to drive if they are comfortable driving and not consuming narcotic pain medications. Follow up is arranged following PT. All questions were reviewed and answered.    Return in about 4 weeks (around 8/10/2021) for discussion of Physical Therapy results.    Diagnoses and all orders for this visit:    1. Spinal stenosis, lumbar region, with neurogenic claudication (Primary)  -     Ambulatory Referral to Physical Therapy Evaluate and treat    2. Status post lumbar surgery  -     Ambulatory Referral to Physical Therapy Evaluate and treat             Bernabe Costello MD [facility]  Neurological Surgery

## 2021-07-13 ENCOUNTER — OFFICE VISIT (OUTPATIENT)
Dept: NEUROSURGERY | Facility: CLINIC | Age: 56
End: 2021-07-13

## 2021-07-13 VITALS
DIASTOLIC BLOOD PRESSURE: 88 MMHG | SYSTOLIC BLOOD PRESSURE: 140 MMHG | WEIGHT: 282 LBS | BODY MASS INDEX: 37.37 KG/M2 | TEMPERATURE: 98.7 F | HEIGHT: 73 IN

## 2021-07-13 DIAGNOSIS — M48.062 SPINAL STENOSIS, LUMBAR REGION, WITH NEUROGENIC CLAUDICATION: Primary | ICD-10-CM

## 2021-07-13 DIAGNOSIS — Z98.890 STATUS POST LUMBAR SURGERY: ICD-10-CM

## 2021-07-13 PROCEDURE — 99024 POSTOP FOLLOW-UP VISIT: CPT | Performed by: NEUROLOGICAL SURGERY

## 2021-07-15 ENCOUNTER — TREATMENT (OUTPATIENT)
Dept: PHYSICAL THERAPY | Facility: CLINIC | Age: 56
End: 2021-07-15

## 2021-07-15 DIAGNOSIS — M54.50 LUMBAR SPINE PAINFUL ON MOVEMENT: Primary | ICD-10-CM

## 2021-07-15 DIAGNOSIS — Z98.890 STATUS POST LUMBAR SURGERY: ICD-10-CM

## 2021-07-15 PROCEDURE — 97110 THERAPEUTIC EXERCISES: CPT | Performed by: PHYSICAL THERAPIST

## 2021-07-15 PROCEDURE — 97162 PT EVAL MOD COMPLEX 30 MIN: CPT | Performed by: PHYSICAL THERAPIST

## 2021-07-16 ENCOUNTER — TREATMENT (OUTPATIENT)
Dept: PHYSICAL THERAPY | Facility: CLINIC | Age: 56
End: 2021-07-16

## 2021-07-16 DIAGNOSIS — M54.50 LUMBAR SPINE PAINFUL ON MOVEMENT: Primary | ICD-10-CM

## 2021-07-16 DIAGNOSIS — Z98.890 STATUS POST LUMBAR SURGERY: ICD-10-CM

## 2021-07-16 PROCEDURE — 97110 THERAPEUTIC EXERCISES: CPT | Performed by: PHYSICAL THERAPIST

## 2021-07-16 PROCEDURE — 97530 THERAPEUTIC ACTIVITIES: CPT | Performed by: PHYSICAL THERAPIST

## 2021-07-16 NOTE — PATIENT INSTRUCTIONS
Access Code: [email or web address]  URL: [email or web address]  Date: 07/16/2021  Prepared by: Kenia Waddell    Exercises  Supine Figure 4 Piriformis Stretch - 2 x daily - 1 sets - 3 reps - 20 hold  Supine Piriformis Stretch with Foot on Ground - 2 x daily - 1 sets - 3 reps - 20 hold  Standing Row with Anchored Resistance - 1 x daily - 1 sets - 20 reps - 3 hold  Standing Anti-Rotation Press with Anchored Resistance - 1 x daily - 15 reps - 1 sets - 3 hold  Wall Push Up - 1 x daily - 2 sets - 10 reps - 3 hold  Supine Knees to Chest with Swiss Ball - 2 x daily - 1 sets - 25 reps  Seated Flexion Stretch with Swiss Ball - 2 x daily - 10 reps - 1 sets - 10 hold

## 2021-07-16 NOTE — PROGRESS NOTES
Physical Therapy Daily Progress Note    Visit # : 2  Wei Salgado reports: I returned to work a couple of days again but my son and son in law helping me with all the lifting.  No pain with sitting or laying down but I'm getting restless legs at night with difficulty sleeping.     Subjective     Objective   See Exercise, Manual, and Modality Logs for complete treatment.     Assessment/Plan  Pt is responding favorably to ex program s/p lumbar decompression/laminectomy.  Good tolerance to core stabilization progression and was issued updated HEP printout to facilitate compliance and recall.  Pt demonstrates good log roll technique rolling from supine to sidelying to sitting while maintaining neutral spine.   Progress strengthening /stabilization /functional activity       Timed:  Manual Therapy:    -     mins  70429;  Therapeutic Exercise:    25     mins  39377;     Neuromuscular Arielle:    -    mins  64181;    Therapeutic Activity:     10     mins  79691;     Gait Training:      -     mins  23873;     Ultrasound:     -     mins  08096;    Iontophoresis                 -     mins 79220    Timed Treatment:   35   mins   Total Treatment:     45   mins      Kenia Waddell PT  Physical Therapist  [place] License # [id number]

## 2021-07-19 ENCOUNTER — TELEPHONE (OUTPATIENT)
Dept: PAIN MEDICINE | Facility: CLINIC | Age: 56
End: 2021-07-19

## 2021-07-20 ENCOUNTER — TREATMENT (OUTPATIENT)
Dept: PHYSICAL THERAPY | Facility: CLINIC | Age: 56
End: 2021-07-20

## 2021-07-20 DIAGNOSIS — R26.2 DIFFICULTY IN WALKING: ICD-10-CM

## 2021-07-20 DIAGNOSIS — S39.012A STRAIN OF LUMBAR REGION, INITIAL ENCOUNTER: ICD-10-CM

## 2021-07-20 DIAGNOSIS — M54.50 LUMBAR SPINE PAINFUL ON MOVEMENT: Primary | ICD-10-CM

## 2021-07-20 DIAGNOSIS — Z98.890 STATUS POST LUMBAR SURGERY: ICD-10-CM

## 2021-07-20 PROCEDURE — 97530 THERAPEUTIC ACTIVITIES: CPT | Performed by: PHYSICAL THERAPIST

## 2021-07-20 PROCEDURE — 97110 THERAPEUTIC EXERCISES: CPT | Performed by: PHYSICAL THERAPIST

## 2021-07-20 NOTE — PROGRESS NOTES
Physical Therapy Daily Progress Note      Patient: Wei Salgado   : 1965  Referring practitioner: No ref. provider found  Date of Initial Visit: Type: THERAPY  Noted: 7/15/2021  Today's Date: 2021  Patient seen for 3 sessions         Wei Salgado reports: 0/10 pain sitting; recently at most 1-2/10 pain.      Objective   See Exercise, Manual, and Modality Logs for complete treatment.       Assessment/Plan  Pt reports he is gradually building up his activity; increased pain and soreness after watching grandkids over the weekend, deferred HEP that day but has completed all other days since first session. Added hip strengthening with resisted side stepping and monster walks along with step up + knee drive with cues for stability and core activation. Plan to add mini squats and dead lift without weight next session for continued strengthening and return to PLOF. Pt reports he is not lifting anything at work; has help if needed. Just recently returned to full 8 hour day; instructed to ice following work day as needed. Deferred heat s/p exercise with report of no increase in pain.        Progress per Plan of Care and Progress strengthening /stabilization /functional activity       Timed:  Manual Therapy:    -     mins  31528;  Therapeutic Exercise:    27     mins  50673;     Neuromuscular Arielle:    -    mins  14372;    Therapeutic Activity:     11     mins  70910;     Gait Training:      -     mins  51906;     Ultrasound:     -     mins  44635;    Electrical Stimulation:    -     mins  22760 ( );    Untimed:  Electrical Stimulation:    -     mins  24503 ( );  Mechanical Traction:    -     mins  56643;     Timed Treatment:   38   mins   Total Treatment:     40   mins  Danya John PT  Physical Therapist

## 2021-07-26 ENCOUNTER — TREATMENT (OUTPATIENT)
Dept: PHYSICAL THERAPY | Facility: CLINIC | Age: 56
End: 2021-07-26

## 2021-07-26 DIAGNOSIS — M54.50 LUMBAR SPINE PAINFUL ON MOVEMENT: Primary | ICD-10-CM

## 2021-07-26 DIAGNOSIS — S39.012A STRAIN OF LUMBAR REGION, INITIAL ENCOUNTER: ICD-10-CM

## 2021-07-26 DIAGNOSIS — R26.2 DIFFICULTY IN WALKING: ICD-10-CM

## 2021-07-26 DIAGNOSIS — Z98.890 STATUS POST LUMBAR SURGERY: ICD-10-CM

## 2021-07-26 PROCEDURE — 97530 THERAPEUTIC ACTIVITIES: CPT | Performed by: PHYSICAL THERAPIST

## 2021-07-26 PROCEDURE — 97110 THERAPEUTIC EXERCISES: CPT | Performed by: PHYSICAL THERAPIST

## 2021-07-26 NOTE — PROGRESS NOTES
Physical Therapy Daily Progress Note        Patient: Wei Salgado   : 1965  Diagnosis/ICD-10 Code:  Lumbar spine painful on movement [M54.5]  Referring practitioner: Bernabe Costello,*  Date of Initial Visit: Type: THERAPY  Noted: 7/15/2021  Today's Date: 2021  Patient seen for 4 sessions         Wei Salgado reports: he overdid it on Friday and Saturday at work and he could feel it on Saturday.  He rested yesterday and it is better yesterday.         Subjective     Objective   See Exercise, Manual, and Modality Logs for complete treatment.       Assessment/Plan  Pt able to progress strengthening this date without complaints however required increased cueing for technique with new exercises.  Continued to focus on core strength and stability.  Will continue to progress exercises as tolerated to increase LE strength and improve tolerance to lifting and work related tasks as released by MD.     Progress per Plan of Care           Manual Therapy:         mins  34235;  Therapeutic Exercise:    28     mins  07190;     Neuromuscular Arielle:        mins  65417;    Therapeutic Activity:     10     mins  97898;     Gait Training:           mins  33382;     Ultrasound:          mins  21844;    Electrical Stimulation:         mins  42774 ( );  Dry Needling          mins self-pay    Timed Treatment:   38   mins   Total Treatment:     47   mins    Loraine Juárez PTA  Physical Therapist Assistant

## 2021-07-29 ENCOUNTER — TREATMENT (OUTPATIENT)
Dept: PHYSICAL THERAPY | Facility: CLINIC | Age: 56
End: 2021-07-29

## 2021-07-29 DIAGNOSIS — Z98.890 STATUS POST LUMBAR SURGERY: ICD-10-CM

## 2021-07-29 DIAGNOSIS — R26.2 DIFFICULTY IN WALKING: ICD-10-CM

## 2021-07-29 DIAGNOSIS — M54.50 LUMBAR SPINE PAINFUL ON MOVEMENT: Primary | ICD-10-CM

## 2021-07-29 DIAGNOSIS — S39.012A STRAIN OF LUMBAR REGION, INITIAL ENCOUNTER: ICD-10-CM

## 2021-07-29 PROCEDURE — 97530 THERAPEUTIC ACTIVITIES: CPT | Performed by: PHYSICAL THERAPIST

## 2021-07-29 PROCEDURE — 97112 NEUROMUSCULAR REEDUCATION: CPT | Performed by: PHYSICAL THERAPIST

## 2021-07-29 PROCEDURE — 97110 THERAPEUTIC EXERCISES: CPT | Performed by: PHYSICAL THERAPIST

## 2021-07-29 NOTE — PROGRESS NOTES
"   Physical Therapy Daily Progress Note      Patient: Wei Salgado   : 1965  Referring practitioner: Bernabe Costello,*  Date of Initial Visit: Type: THERAPY  Noted: 7/15/2021  Today's Date: 2021  Patient seen for 5 sessions         Wei Salgado reports: back is doing better, tired from being at work \"used the table saw a lot recently\". Pt reports leg weakness since surgery but improving with return to work and normal activities.      Objective   See Exercise, Manual, and Modality Logs for complete treatment.     Gait: antalgic with decreased step length and non-symmetrical WS BLE      Assessment/Plan  Pt able to progress reps of exercises this date for increased difficulty; added bridge + HS curls and standing chops. Pt reports he could feel stiffness in his low back after prolonged working at the table saw; pt educated on completing a couple standing spinal ext exercises after prolonged flexion to improve mobility. Pt also educated to focus on increasing step length and equal weight shifting during gait training vs short step and decreased WS onto RLE. Cues for posture and core activation throughout exercises. Pt reports leg weakness at the end of his work day and encouraged to start walking program at home to improve muscle endurance; increased reps of LE strengthening this date without any reports of pain.    Progress per Plan of Care and Progress strengthening /stabilization /functional activity         Timed:  Manual Therapy:    -     mins  36567;  Therapeutic Exercise:    31     mins  34430;     Neuromuscular Arielle:    11    mins  04794;    Therapeutic Activity:     12     mins  26607;     Gait Training:      -     mins  44011;     Ultrasound:     -     mins  47187;    Electrical Stimulation:    -     mins  08405 ( );    Untimed:  Electrical Stimulation:    -     mins  39306 ( );  Mechanical Traction:    -     mins  24599;     Timed Treatment:   54   mins   Total Treatment: "     58   mins  Danya John, PT  Physical Therapist

## 2021-08-03 ENCOUNTER — TREATMENT (OUTPATIENT)
Dept: PHYSICAL THERAPY | Facility: CLINIC | Age: 56
End: 2021-08-03

## 2021-08-03 DIAGNOSIS — S39.012A STRAIN OF LUMBAR REGION, INITIAL ENCOUNTER: ICD-10-CM

## 2021-08-03 DIAGNOSIS — Z98.890 STATUS POST LUMBAR SURGERY: ICD-10-CM

## 2021-08-03 DIAGNOSIS — R26.2 DIFFICULTY IN WALKING: ICD-10-CM

## 2021-08-03 DIAGNOSIS — M54.50 LUMBAR SPINE PAINFUL ON MOVEMENT: Primary | ICD-10-CM

## 2021-08-03 PROCEDURE — 97530 THERAPEUTIC ACTIVITIES: CPT | Performed by: PHYSICAL THERAPIST

## 2021-08-03 PROCEDURE — 97110 THERAPEUTIC EXERCISES: CPT | Performed by: PHYSICAL THERAPIST

## 2021-08-03 PROCEDURE — 97112 NEUROMUSCULAR REEDUCATION: CPT | Performed by: PHYSICAL THERAPIST

## 2021-08-03 NOTE — PROGRESS NOTES
"Re-Assessment / Re-Certification      Patient: Wei Salgado   : 1965  Diagnosis/ICD-10 Code:  Lumbar spine painful on movement [M54.5]  Referring practitioner: Bernabe Costello,*  Date of Initial Visit: Type: THERAPY  Noted: 7/15/2021  Today's Date: 8/3/2021  Patient seen for 6 sessions      Subjective:   Wei Salgado reports: \"I was able to walk ~ 2 miles on  and did some squats, oddly it helped the soreness.\" Pt reports /10 back pain today. Pt reports he sees MD 8/10/2021.    Subjective Questionnaire: Oswestry:  or 12% and LEFS: 56/80  Clinical Progress: improved  Home Program Compliance: Yes  Treatment has included: therapeutic exercise, neuromuscular re-education, therapeutic activity and gait training    Subjective   Pt with laminectomy on 21 with improving radiating pain down LE with pain only at low back area. Pt still under lifting precautions of 25 lbs and having to modify work/job as a . Increase pain and soreness with use of table saw and pt reports leg weakness improved since surgery but continued fatigue especially with return to work.     Objective     Active Range of Motion      Lumbar   Left rotation: WFL  Right rotation: WFL  Flexion limited 80% of WNL with stretch  Extension 75% with mild pain  Side bending 50% of WNL no pain    Gait  Pt with mildly antalgic gait pattern with decreased step length BLE and limited hip ext BLE, decreased terminal stance on RLE and decrease WS onto RLE.    Strength  Hip flexion: 4/5 BLE  Hip ABD: 4-/5 LLE, 3+/5 RLE  Hip ext: 4-5/ BLE    Knee flexion: 4+/5  Knee ext: 5/5    Ankle DF: 5/5  Toe ext: 5/5    Assessment & Plan     Assessment  Impairments: abnormal coordination, abnormal or restricted ROM, lacks appropriate home exercise program and pain with function  Assessment details: Wei Salgado is a 56 y.o. year-old male referred to physical therapy for s/p L3-4 laminectomy and decompression. Pt has attended 6 physical " therapy session since his surgery; still under 25 lb lifting restriction. Pt modifying/limiting work as /ball. Since initiation of physical therapy, pt with improving pain; denies any radiating pain down his legs. Pt requires cues for posture and core activation with functional mobility but continued antalgic gait pattern with limited step length and decreased WS. Pt with impaired muscle endurance reporting fatigue especially at work; encouraged to initiate walking program to improve upright endurance and activity tolerance. Pt has met all STGs and progressing towards LTGs. Pt able to progress reps/resistance bands this date; moderate LE soreness reported following last session Pt would benefit from continued skilled PT 2 times per week for continued strengthening to be able to work a full day without restrictions and controlled pain. Pt educated on importance of posture, core activation, walking program and proper gait mechanics to be able to return to normal activity.   Prognosis: good  Functional Limitations: carrying objects, lifting, walking, pulling, pushing, uncomfortable because of pain and standing  Goals  Plan Goals: STGs to be met by 2 weeks  1.) Pt will be independent and compliant with initial home exercise program.-MET  2.) Pt will report low back pain </= 5/10 to increase ease of standing to prepare a meal.-MET  3.) Pt will be able to stand for one hour without rest break with low back pain </= 2-3/10. -MET    LTGs to be met by 4 weeks  1.) Pt will be independent and compliant with advanced home exercise program. -NOT MET  2.) Pt will report low back and LE pain </= 3/10 to increase ease of performing work-related duties.-PROGRESSING  3.) Pt will score >/=57/80 on LEFS indicating decreased perceived functional disability. -PROGRESSING 56/80  4.) Pt will work full work day with minimal to no discomfort in low back or LE.-PROGRESSING       Plan  Therapy options: will be seen for  skilled physical therapy services  Planned modality interventions: cryotherapy, dry needling, high voltage pulsed current (pain management), hydrotherapy, TENS, thermotherapy (hydrocollator packs) and ultrasound  Planned therapy interventions: manual therapy, neuromuscular re-education, soft tissue mobilization, spinal/joint mobilization, strengthening, stretching, therapeutic activities, joint mobilization, IADL retraining, home exercise program, gait training, functional ROM exercises, flexibility, ADL retraining, abdominal trunk stabilization and postural training  Frequency: 2x week  Duration in visits: 12  Duration in weeks: 6  Treatment plan discussed with: patient  Plan details: Develop and progress core and LE strengthening, functional mobility, pain control, and activity endurance.           Visit Diagnoses:    ICD-10-CM ICD-9-CM   1. Lumbar spine painful on movement  M54.5 724.2   2. Status post lumbar surgery  Z98.890 V45.89   3. Strain of lumbar region, initial encounter  S39.012A 847.2   4. Difficulty in walking  R26.2 719.7         PT Signature: Danya John, PT    Timed:  Manual Therapy:    -     mins  45843;  Therapeutic Exercise:    31     mins  95437;     Neuromuscular Arielle:    12    mins  88391;    Therapeutic Activity:     11     mins  38157;     Gait Training:      -     mins  57228;     Ultrasound:     -     mins  06311;    Electrical Stimulation:    -     mins  70363 ( );    Untimed:  Electrical Stimulation:    -     mins  97170 ( );  Mechanical Traction:    -     mins  83108;     Timed Treatment:   54   mins   Total Treatment:     58   mins

## 2021-08-04 NOTE — PROGRESS NOTES
"Subjective   History of Present Illness: Wei Salgado is a 56 y.o. male is here today for follow-up after going to physical therapy. Mr. Salgado is 6 weeks and 1 day out from having a L3-4 laminectomy for bilateral decompression on 06-28-21.    Today, Mr. Salgado reports that h has been going to physical therapy and his therapist is starting to back off on the frequency. He reports mild discomfort in his back with activity. He denies any leg pain.  Has had no issues with the incision.  He has been back to work being mindful of the lifting.    While in the room and during my examination of the patient I wore a mask and eye protection.  I washed my hands before and after this patient encounter.  The patient was also wearing a mask.    History of Present Illness    The following portions of the patient's history were reviewed and updated as appropriate: allergies, current medications, past family history, past medical history, past social history, past surgical history and problem list.    Review of Systems   Respiratory: Negative for chest tightness and shortness of breath.    Cardiovascular: Negative for chest pain.   Musculoskeletal: Negative for back pain.       Objective     Vitals:    08/10/21 1155   BP: 150/88   Temp: 98.4 °F (36.9 °C)   Weight: 128 kg (282 lb)   Height: 185.4 cm (73\")     Body mass index is 37.21 kg/m².      Physical Exam  Neurologic Exam    Physical Exam:    CONSTITUTIONAL:  appears well developed, well-nourished and in no acute distress.    NECK: the neck is supple and symmetric. The trachea is midline with no masses.      PULMONARY: Respiratory effort is normal with no increased work of breathing or signs of respiratory distress.    CARDIOVASCULAR: Pedal pulses are +2/4 bilaterally. Examination of the extremities shows no edema or varicosities.    MUSCULOSKELETAL: Gait normal, he fell last week and scraped his right knee with an abrasion to the lateral surface of the right knee.    SKIN: The " skin is warm, dry and intact.     NEUROLOGIC:   Normal motor strength noted. Muscle bulk and tone are normal.  Sensory exam is normal to all modalities.  Fairly raising test Nick sign is negative bilaterally  Cortical function is intact and without deficits. Speech is normal.    PSYCHIATRIC: oriented to person, place and time. Patient's mood and affect are normal.      Assessment/Plan     Medical Decision Making:      From the surgery perspective the healing has progressed to allow resumption of normal preoperative activity including work. We encouraged continuation of the therapy exercises long term. For now we will plan on follow up only as needed in the future and would be happy to re-evaluate at any time.     Return if symptoms worsen or fail to improve.    Diagnoses and all orders for this visit:    1. Status post lumbar surgery (Primary)    2. Spinal stenosis, lumbar region, with neurogenic claudication             Bernabe Costello MD FACS [facility]  Neurological Surgery

## 2021-08-05 ENCOUNTER — TREATMENT (OUTPATIENT)
Dept: PHYSICAL THERAPY | Facility: CLINIC | Age: 56
End: 2021-08-05

## 2021-08-05 DIAGNOSIS — S39.012A STRAIN OF LUMBAR REGION, INITIAL ENCOUNTER: ICD-10-CM

## 2021-08-05 DIAGNOSIS — M54.50 LUMBAR SPINE PAINFUL ON MOVEMENT: Primary | ICD-10-CM

## 2021-08-05 DIAGNOSIS — Z98.890 STATUS POST LUMBAR SURGERY: ICD-10-CM

## 2021-08-05 DIAGNOSIS — R26.2 DIFFICULTY IN WALKING: ICD-10-CM

## 2021-08-05 PROCEDURE — 97112 NEUROMUSCULAR REEDUCATION: CPT | Performed by: PHYSICAL THERAPIST

## 2021-08-05 PROCEDURE — 97110 THERAPEUTIC EXERCISES: CPT | Performed by: PHYSICAL THERAPIST

## 2021-08-05 NOTE — PROGRESS NOTES
"   Physical Therapy Daily Progress Note      Patient: Wei Salgado   : 1965  Referring practitioner: Bernabe Costello,*  Date of Initial Visit: Type: THERAPY  Noted: 7/15/2021  Today's Date: 2021  Patient seen for 7 sessions         Wei Salgado reports: back soreness following 1.5 mile walk on Tuesday. Pt reports he fell tripping over a curb; skinning his R knee and elbow along with L thumb; missing a curb step in the shadows. Pt reports no increase in back pain since fall. \"Heavy discomfort.\"       Objective   See Exercise, Manual, and Modality Logs for complete treatment.     Active Range of Motion      Lumbar   Left rotation: WFL  Right rotation: WFL  Flexion limited 80% of WNL with stretch end range  Extension 75% with mild pain  Side bending 50% of WNL no pain      Assessment/Plan  Pt reports he has been walking more and working longer hours at work which could account for increased back soreness along with fall this afternoon hitting his knee. Pt exercises modified slightly this date due to skinned R knee. Pt and PT discussed letting his body recover between walking and PT workouts, and to modify walks to complete at moderate speed with intermittent \"fast\" speed vs all \"fast\" pace. Pt reports he has a follow up with his surgeon next week; advised pt to discuss recent fall concerns, lifting restrictions and/or return to work restrictions. Pt continues to improve core activation with all exercises; limited by leg weakness. Plan to continue to progress exercises per pt's tolerance and MD instruction post-follow up visit.        Progress per Plan of Care and Progress strengthening /stabilization /functional activity       Timed:  Manual Therapy:    -     mins  45239;  Therapeutic Exercise:    35     mins  67777;     Neuromuscular Arielle:    12    mins  44187;    Therapeutic Activity:     -     mins  79944;     Gait Training:      -     mins  83737;     Ultrasound:     -     mins  16665;  "   Electrical Stimulation:    -     mins  55496 ( );    Untimed:  Electrical Stimulation:    -     mins  65552 ( );  Mechanical Traction:    -     mins  92389;     Timed Treatment:   47   mins   Total Treatment:     50   mins  Danya John PT  Physical Therapist

## 2021-08-10 ENCOUNTER — OFFICE VISIT (OUTPATIENT)
Dept: NEUROSURGERY | Facility: CLINIC | Age: 56
End: 2021-08-10

## 2021-08-10 VITALS
SYSTOLIC BLOOD PRESSURE: 150 MMHG | WEIGHT: 282 LBS | BODY MASS INDEX: 37.37 KG/M2 | DIASTOLIC BLOOD PRESSURE: 88 MMHG | HEIGHT: 73 IN | TEMPERATURE: 98.4 F

## 2021-08-10 DIAGNOSIS — M48.062 SPINAL STENOSIS, LUMBAR REGION, WITH NEUROGENIC CLAUDICATION: ICD-10-CM

## 2021-08-10 DIAGNOSIS — Z98.890 STATUS POST LUMBAR SURGERY: Primary | ICD-10-CM

## 2021-08-10 PROCEDURE — 99024 POSTOP FOLLOW-UP VISIT: CPT | Performed by: NEUROLOGICAL SURGERY

## 2021-08-12 ENCOUNTER — TREATMENT (OUTPATIENT)
Dept: PHYSICAL THERAPY | Facility: CLINIC | Age: 56
End: 2021-08-12

## 2021-08-12 DIAGNOSIS — M54.50 LUMBAR SPINE PAINFUL ON MOVEMENT: Primary | ICD-10-CM

## 2021-08-12 DIAGNOSIS — R26.2 DIFFICULTY IN WALKING: ICD-10-CM

## 2021-08-12 DIAGNOSIS — S39.012A STRAIN OF LUMBAR REGION, INITIAL ENCOUNTER: ICD-10-CM

## 2021-08-12 DIAGNOSIS — Z98.890 STATUS POST LUMBAR SURGERY: ICD-10-CM

## 2021-08-12 PROCEDURE — 97112 NEUROMUSCULAR REEDUCATION: CPT | Performed by: PHYSICAL THERAPIST

## 2021-08-12 PROCEDURE — 97110 THERAPEUTIC EXERCISES: CPT | Performed by: PHYSICAL THERAPIST

## 2021-08-12 NOTE — PATIENT INSTRUCTIONS
Access Code: [email or web address]  URL: [email or web address]  Date: 08/12/2021  Prepared by: Danya Ferrera    Exercises  Supine Lower Trunk Rotation - 1 x daily - 7 x weekly - 10 reps  Supine Piriformis Stretch with Foot on Ground - 1 x daily - 7 x weekly - 3 sets - 10 hold  Supine Single Knee to Chest Stretch - 1 x daily - 7 x weekly - 3 sets - 10 hold  Supine Bridge - 1 x daily - 7 x weekly - 15 reps - 3 hold  Supine Hamstring Curl on Swiss Ball - 1 x daily - 7 x weekly - 20 reps  Standing Anti-Rotation Press with Anchored Resistance - 1 x daily - 7 x weekly - 20 reps  Standing Shoulder Row with Anchored Resistance - 1 x daily - 7 x weekly - 20 reps  Side Stepping with Resistance at Ankles - 1 x daily - 7 x weekly  Forward Monster Walks - 1 x daily - 7 x weekly  Standing Hip Abduction - 1 x daily - 7 x weekly - 10 reps  Push Up on Table - 1 x daily - 7 x weekly - 20 reps  Runner's Step Up/Down - 1 x daily - 7 x weekly - 15 reps  Mini Squat with Counter Support - 1 x daily - 7 x weekly - 15 reps  Standing Diagonal Chops with Medicine Ball - 1 x daily - 7 x weekly - 10 reps  Bird Dog - 1 x daily - 7 x weekly - 10 reps  Standing Hamstring Stretch with Step - 1 x daily - 7 x weekly - 3 sets - 10 hold  Supine Piriformis Stretch with Foot on Ground - 1 x daily - 7 x weekly - 3 sets - 10 hold  Supine Figure 4 Piriformis Stretch - 1 x daily - 7 x weekly - 3 sets - 10 hold

## 2021-08-12 NOTE — PROGRESS NOTES
"   Physical Therapy Daily Progress Note      Patient: Wei Salgado   : 1965  Referring practitioner: Bernabe Costello,*  Date of Initial Visit: Type: THERAPY  Noted: 7/15/2021  Today's Date: 2021  Patient seen for 8 sessions         Wei Salgado reports: \"I am working full days now for the past 2 weeks. 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       Objective   See Exercise, Manual, and Modality Logs for complete treatment.       Assessment/Plan  Pt reports he was released from surgeon with no restrictions; updated HEP and exercises this date to include lifting weighted items with box squats and chops with weighted medicine ball. Progress mat exercises to standing for continued hip ABD/ADD strength and to progress LE strength and continued to address core stability with addition of bird dog exercise. Added elliptical at start of session to improve gait mechanics with increased JAIMEE and equal/symmetrical WS to each LE; improving mechanics over ground following activity. Noted improving balance with runner's step up and monster walks this date. Pt appropriate to continue PT 1 day per week for 6-8 weeks to transition fully back to job without injury and safety with lifting as a ball. Updated and issued HEP for carryover to home with decreased frequency to 1 day per week.       Progress per Plan of Care and Progress strengthening /stabilization /functional activity           Timed:  Manual Therapy:    -     mins  85541;  Therapeutic Exercise:    33     mins  03216;     Neuromuscular Arielle:    12    mins  54681;    Therapeutic Activity:     -     mins  46449;     Gait Training:      -     mins  54150;     Ultrasound:     -     mins  39974;    Electrical Stimulation:    -     mins  89522 ( );    Untimed:  Electrical Stimulation:    -     mins  49545 ( );  Mechanical Traction:    -     mins  99795; "     Timed Treatment:   45   mins   Total Treatment:     48   mins  Danya John, PT  Physical Therapist

## 2021-08-17 ENCOUNTER — TREATMENT (OUTPATIENT)
Dept: PHYSICAL THERAPY | Facility: CLINIC | Age: 56
End: 2021-08-17

## 2021-08-17 DIAGNOSIS — M54.50 LUMBAR SPINE PAINFUL ON MOVEMENT: Primary | ICD-10-CM

## 2021-08-17 DIAGNOSIS — Z98.890 STATUS POST LUMBAR SURGERY: ICD-10-CM

## 2021-08-17 PROCEDURE — 97110 THERAPEUTIC EXERCISES: CPT | Performed by: PHYSICAL THERAPIST

## 2021-08-17 PROCEDURE — 97530 THERAPEUTIC ACTIVITIES: CPT | Performed by: PHYSICAL THERAPIST

## 2021-08-17 NOTE — PROGRESS NOTES
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  He said he has had some leg weakness but nothing in the back.  He told his  that he can't do that again for awhile.  He took  and Monday off because of it.         Subjective     Objective   See Exercise, Manual, and Modality Logs for complete treatment.       Assessment/Plan  Maintained exercises this date due to increased irritation to back since Saturday.  Closely monitored tolerance to provide modifications as necessary however none required.  He continued to demonstrate fatigue with exercises.  Will continue to monitor tolerance as well as progress core stability with functional tasks for return to work without increased pain.    Progress per Plan of Care           Manual Therapy:         mins  62024;  Therapeutic Exercise:    25     mins  88927;     Neuromuscular Arielle:        mins  39046;    Therapeutic Activity:     24     mins  16252;     Gait Training:           mins  81917;     Ultrasound:          mins  82623;    Electrical Stimulation:         mins  47361 ( );  Dry Needling          mins self-pay    Timed Treatment:   49   mins   Total Treatment:     49   mins    Loraine Juárez PTA  Physical Therapist Assistant

## 2021-08-24 ENCOUNTER — TREATMENT (OUTPATIENT)
Dept: PHYSICAL THERAPY | Facility: CLINIC | Age: 56
End: 2021-08-24

## 2021-08-24 DIAGNOSIS — M54.50 LUMBAR SPINE PAINFUL ON MOVEMENT: Primary | ICD-10-CM

## 2021-08-24 DIAGNOSIS — R26.2 DIFFICULTY IN WALKING: ICD-10-CM

## 2021-08-24 DIAGNOSIS — S39.012A STRAIN OF LUMBAR REGION, INITIAL ENCOUNTER: ICD-10-CM

## 2021-08-24 DIAGNOSIS — Z98.890 STATUS POST LUMBAR SURGERY: ICD-10-CM

## 2021-08-24 PROCEDURE — 97110 THERAPEUTIC EXERCISES: CPT | Performed by: PHYSICAL THERAPIST

## 2021-08-24 PROCEDURE — 97530 THERAPEUTIC ACTIVITIES: CPT | Performed by: PHYSICAL THERAPIST

## 2021-08-24 NOTE — PROGRESS NOTES
Physical Therapy Daily Progress Note        Patient: Wei Salgado   : 1965  Diagnosis/ICD-10 Code:  Lumbar spine painful on movement [M54.5]  Referring practitioner: Bernabe Costello,*  Date of Initial Visit: Type: THERAPY  Noted: 7/15/2021  Today's Date: 2021  Patient seen for 10 sessions         Wei Salgado reports: his back is still sore from working the weekend before last.  He said it was a mild ache/discomfort in the mid back above his hips.  Before that he wasn't having any pain.  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  He feels his legs are about the same strength wise.  He has backed off his exercises because of the soreness.  They weren't making him worse he just wasn't sure if he should do them.         Subjective Evaluation    Pain  Current pain ratin  At best pain ratin (sitting, resting, in bed)  At worst pain rating: 3           Objective          Active Range of Motion     Lumbar   Flexion: Active lumbar flexion: 90% of WNL with stretch in hamstrings    Extension: Active lumbar extension: 75% with mild pain.   Left lateral flexion: Active left lumbar lateral flexion: 50% of WNL.   Right lateral flexion: Active right lumbar lateral flexion: 50% of WNL.   Left rotation: WFL  Right rotation: WFL    Strength/Myotome Testing     Left Hip   Planes of Motion   Flexion: 5  Extension: 4+  Abduction: 4+    Right Hip   Planes of Motion   Flexion: 4+  Extension: 4-  Abduction: 4    Left Knee   Flexion: 4+  Extension: 5    Right Knee   Flexion: 4+  Extension: 5    Left Ankle/Foot   Dorsiflexion: 5    Right Ankle/Foot   Dorsiflexion: 5      See Exercise, Manual, and Modality Logs for complete treatment.       Assessment/Plan  Pt with improved strength as noted through MMT as well as improved flexion ROM.  He has had persistent soreness in his mid back for the last week thus added ice following exercises.  May add estim next session if pain  persists.  Modified exercises to avoid stress on low back thus held minisquats with box lift, deadlifts, and chops.  Added leg press this date to focus on leg strengthening with increased lumbar support.  Plan to add lunges and work on floor transfers as pt reported discomfort/difficulty with getting up/down from floor which is necessary for return to full time work.  May also increased core strengthening in sitting and 1/2 kneeling for more functional positions necessary for installs at work.  Will monitor tolerance to modifications this date and will conitnued to progress core strength towards decreased pain and return to fully duty at work with decreased risk of reinjury.     Progress per Plan of Care           Manual Therapy:         mins  20739;  Therapeutic Exercise:    31     mins  87580;     Neuromuscular Arielle:        mins  34911;    Therapeutic Activity:     15     mins  72712;     Gait Training:           mins  03712;     Ultrasound:          mins  55200;    Electrical Stimulation:         mins  33016 ( );  Dry Needling          mins self-pay    Timed Treatment:   46   mins   Total Treatment:     60   mins    Loraine Juárez PTA  Physical Therapist Assistant

## 2021-08-31 ENCOUNTER — TREATMENT (OUTPATIENT)
Dept: PHYSICAL THERAPY | Facility: CLINIC | Age: 56
End: 2021-08-31

## 2021-08-31 DIAGNOSIS — Z98.890 STATUS POST LUMBAR SURGERY: ICD-10-CM

## 2021-08-31 DIAGNOSIS — M54.50 LUMBAR SPINE PAINFUL ON MOVEMENT: Primary | ICD-10-CM

## 2021-08-31 PROCEDURE — 97530 THERAPEUTIC ACTIVITIES: CPT | Performed by: PHYSICAL THERAPIST

## 2021-08-31 PROCEDURE — 97110 THERAPEUTIC EXERCISES: CPT | Performed by: PHYSICAL THERAPIST

## 2021-08-31 NOTE — PROGRESS NOTES
Physical Therapy Daily Progress Note        Patient: Wei Salgado   : 1965  Diagnosis/ICD-10 Code:  Lumbar spine painful on movement [M54.5]  Referring practitioner: Bernabe Costello,*  Date of Initial Visit: Type: THERAPY  Noted: 7/15/2021  Today's Date: 2021  Patient seen for 11 sessions         Wei Salgado reports: he still has the soreness in his back.  He took a couple aspirin.  He reported the soreness is staying about the same.  He rated it about a 2-3/10.  He said it is a dull, discomfort.         Subjective     Objective   See Exercise, Manual, and Modality Logs for complete treatment.       Assessment/Plan  Progressed core strengthening through less stable positions/surfaces.  No complaints with progressions.  Continued with ice to assist with soreness however will continue to determine if IFC would be beneficial to address lingering soreness.  Will continue to monitor tolerance and progress or modify as appropriate.       Progress per Plan of Care           Manual Therapy:        mins  39504;  Therapeutic Exercise:    45     mins  52054;     Neuromuscular Arielle:        mins  01799;    Therapeutic Activity:     10     mins  44563;     Gait Training:           mins  12949;     Ultrasound:          mins  98607;    Electrical Stimulation:         mins  05616 ( );  Dry Needling          mins self-pay    Timed Treatment:   55   mins   Total Treatment:     70   mins    Loraine Juárez PTA  Physical Therapist Assistant

## 2021-09-07 ENCOUNTER — TREATMENT (OUTPATIENT)
Dept: PHYSICAL THERAPY | Facility: CLINIC | Age: 56
End: 2021-09-07

## 2021-09-07 DIAGNOSIS — S39.012A STRAIN OF LUMBAR REGION, INITIAL ENCOUNTER: ICD-10-CM

## 2021-09-07 DIAGNOSIS — M54.50 LUMBAR SPINE PAINFUL ON MOVEMENT: Primary | ICD-10-CM

## 2021-09-07 DIAGNOSIS — Z98.890 STATUS POST LUMBAR SURGERY: ICD-10-CM

## 2021-09-07 DIAGNOSIS — R26.2 DIFFICULTY IN WALKING: ICD-10-CM

## 2021-09-07 PROCEDURE — 97112 NEUROMUSCULAR REEDUCATION: CPT | Performed by: PHYSICAL THERAPIST

## 2021-09-07 PROCEDURE — 97110 THERAPEUTIC EXERCISES: CPT | Performed by: PHYSICAL THERAPIST

## 2021-09-07 PROCEDURE — 97530 THERAPEUTIC ACTIVITIES: CPT | Performed by: PHYSICAL THERAPIST

## 2021-09-07 NOTE — PROGRESS NOTES
Re-Assessment / Re-Certification for 60 Day Progress Note        Patient: Wei Salgado   : 1965  Diagnosis/ICD-10 Code:  Lumbar spine painful on movement [M54.5]  Referring practitioner: Bernabe Costello,*  Date of Initial Visit: Type: THERAPY  Noted: 7/15/2021  Today's Date: 2021  Patient seen for 12 sessions      Subjective:   Wei Salgado reports: today is the first day in a while my back has been feeling good. I can still feel like my R leg is weaker than my L leg. I still feel like I have a limp. Pt reports very minimal back pain today. 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   Subjective Questionnaire:   Back Index: 50   LEFS: 68/80  Clinical Progress: improved  Home Program Compliance: Yes  Treatment has included: therapeutic exercise, neuromuscular re-education, therapeutic activity, gait training, electrical stimulation, ultrasound, traction, moist heat and cryotherapy    Objective     Active Range of Motion      Lumbar   Flexion: Active lumbar flexion: WNL with stretch in hamstrings    Extension: Active lumbar extension: 75% with mild pain.   Left lateral flexion: Active left lumbar lateral flexion: 50% of WNL.   Right lateral flexion: Active right lumbar lateral flexion: 50% of WNL.   Left rotation: WFL  Right rotation: WFL     Strength/Myotome Testing      Left Hip   Planes of Motion   Flexion: 5  Extension: 4+  Abduction: 4+     Right Hip   Planes of Motion   Flexion: 4+  Extension: 4-  Abduction: 4     Left Knee   Flexion: 5  Extension: 5     Right Knee   Flexion: 5  Extension: 5     Left Ankle/Foot   Dorsiflexion: 5     Right Ankle/Foot   Dorsiflexion: 5     Core strength 3+/5    Assessment & Plan     Assessment  Impairments: abnormal coordination, abnormal or restricted ROM, lacks appropriate home exercise program and pain with function  Assessment details: Wei Salgado is a 56 y.o. year-old male referred to physical therapy for s/p  L3-4 laminectomy and decompression. Pt has attended 12 physical therapy session since his surgery; surgeon recently release to full duty at work with mild increase in back pain over the last 2 weeks. Pt reports today he feels better than the past 2 weeks. Pt with improving lumbar AROM, BLE and core strength along with flexibility. Pt with continued RLE leg weakness; denies any radiating pain down his legs. Improving gait pattern with decreased antalgia cues to slow pace and focus on quality vs speed. Pt with impaired muscle endurance reporting fatigue especially at work; encouraged to initiate walking program to improve upright endurance and activity tolerance. Pt has met all STGs and progressing towards LTGs. Pt appropriate to continue PT 1 time per week. Pt educated on importance of posture, core activation, walking program and proper gait mechanics to be able to return to normal activity and work without back pain.   Prognosis: good  Functional Limitations: carrying objects, lifting, walking, pulling, pushing, uncomfortable because of pain and standing  Goals  Plan Goals: STGs to be met by 2 weeks  1.) Pt will be independent and compliant with initial home exercise program.-MET  2.) Pt will report low back pain </= 5/10 to increase ease of standing to prepare a meal.-MET  3.) Pt will be able to stand for one hour without rest break with low back pain </= 2-3/10. -MET    LTGs to be met by 4 weeks  1.) Pt will be independent and compliant with advanced home exercise program. -NOT MET  2.) Pt will report low back and LE pain </= 3/10 to increase ease of performing work-related duties.-Partially MET recently up to 5/10 but today 2-3/10  3.) Pt will score >/=57/80 on LEFS indicating decreased perceived functional disability. -MET  4.) Pt will work full work day with minimal to no discomfort in low back or LE.-PROGRESSING       Plan  Therapy options: will be seen for skilled physical therapy services  Planned modality  interventions: cryotherapy, dry needling, high voltage pulsed current (pain management), hydrotherapy, TENS, thermotherapy (hydrocollator packs) and ultrasound  Planned therapy interventions: manual therapy, neuromuscular re-education, soft tissue mobilization, spinal/joint mobilization, strengthening, stretching, therapeutic activities, joint mobilization, IADL retraining, home exercise program, gait training, functional ROM exercises, flexibility, ADL retraining, abdominal trunk stabilization and postural training  Frequency: 1x week  Duration in visits: 6  Duration in weeks: 6  Treatment plan discussed with: patient  Plan details: Develop and progress core and LE strengthening, functional mobility, pain control, and activity endurance.           Visit Diagnoses:    ICD-10-CM ICD-9-CM   1. Lumbar spine painful on movement  M54.5 724.2   2. Status post lumbar surgery  Z98.890 V45.89   3. Strain of lumbar region, initial encounter  S39.012A 847.2   4. Difficulty in walking  R26.2 719.7         PT Signature: Danya John, PT      Timed:  Manual Therapy:    -     mins  97844;  Therapeutic Exercise:    28     mins  30098;     Neuromuscular Arielle:    12    mins  21099;    Therapeutic Activity:     13     mins  16089;     Gait Training:      -     mins  51156;     Ultrasound:     -     mins  49814;    Electrical Stimulation:    -     mins  46064 ( );    Untimed:  Electrical Stimulation:    -     mins  21052 ( );  Mechanical Traction:    -     mins  67084;     Timed Treatment:   53   mins   Total Treatment:     65   mins

## 2021-09-14 ENCOUNTER — TREATMENT (OUTPATIENT)
Dept: PHYSICAL THERAPY | Facility: CLINIC | Age: 56
End: 2021-09-14

## 2021-09-14 DIAGNOSIS — R26.2 DIFFICULTY IN WALKING: ICD-10-CM

## 2021-09-14 DIAGNOSIS — S39.012A STRAIN OF LUMBAR REGION, INITIAL ENCOUNTER: ICD-10-CM

## 2021-09-14 DIAGNOSIS — Z98.890 STATUS POST LUMBAR SURGERY: ICD-10-CM

## 2021-09-14 DIAGNOSIS — M54.50 LUMBAR SPINE PAINFUL ON MOVEMENT: Primary | ICD-10-CM

## 2021-09-14 PROCEDURE — 97530 THERAPEUTIC ACTIVITIES: CPT | Performed by: PHYSICAL THERAPIST

## 2021-09-14 PROCEDURE — 97112 NEUROMUSCULAR REEDUCATION: CPT | Performed by: PHYSICAL THERAPIST

## 2021-09-14 PROCEDURE — 97110 THERAPEUTIC EXERCISES: CPT | Performed by: PHYSICAL THERAPIST

## 2021-09-14 NOTE — PROGRESS NOTES
Physical Therapy Daily Progress Note      Patient: Wei Salgado   : 1965  Referring practitioner: Bernabe Costello,*  Date of Initial Visit: Type: THERAPY  Noted: 7/15/2021  Today's Date: 2021  Patient seen for 13 sessions         Wei Salgado reports: -2/10 previously 3-4 up until resting ; pt reports he worked 7 day last week; 1/2 day on .      Objective   See Exercise, Manual, and Modality Logs for complete treatment.       Assessment/Plan  Pt and PT discussed mechanics at work while using table saw; pt reaching over table saw vs walking around to lift board. Walking around may take more time but may be less strain on his back. Pt still demonstrates mild antalgic gait pattern with decreased stance time on RLE with slight bounce and limited heel strike; added uniclock exercise to address RLE stability in stance. Plan to add calf stretch to improve DF ROM and heel strike BLE. Addition of upper posterior chain strengthening with shoulder raises and bent over rows due to reports of fatigue with repetitive lifting at work. Progressed from therabands to cable weights for lateral walk outs and Paloff exercises for core stability; improved seated balance with exercises on swiss ball. Also added sidelying ITBand/QL stretch due to reports of pain at that area with bent over rows and bird dog exercise. Pt instructed to work on R SLS outside of therapy with uni-clock exercise or just statically.        Progress per Plan of Care and Progress strengthening /stabilization /functional activity           Timed:  Manual Therapy:    -     mins  57382;  Therapeutic Exercise:    31     mins  22227;     Neuromuscular Arielle:    12    mins  21956;    Therapeutic Activity:     14     mins  48931;     Gait Training:      -     mins  31519;     Ultrasound:     -     mins  18074;    Electrical Stimulation:    -     mins  61264 ( );    Untimed:  Electrical Stimulation:    -     mins  09278 (  );  Mechanical Traction:    -     mins  02151;     Timed Treatment:   57   mins   Total Treatment:     60   mins  Danya John PT  Physical Therapist

## 2021-09-21 ENCOUNTER — TREATMENT (OUTPATIENT)
Dept: PHYSICAL THERAPY | Facility: CLINIC | Age: 56
End: 2021-09-21

## 2021-09-21 DIAGNOSIS — M54.50 LUMBAR SPINE PAINFUL ON MOVEMENT: Primary | ICD-10-CM

## 2021-09-21 DIAGNOSIS — Z98.890 STATUS POST LUMBAR SURGERY: ICD-10-CM

## 2021-09-21 PROCEDURE — 97530 THERAPEUTIC ACTIVITIES: CPT | Performed by: PHYSICAL THERAPIST

## 2021-09-21 PROCEDURE — 97110 THERAPEUTIC EXERCISES: CPT | Performed by: PHYSICAL THERAPIST

## 2021-09-21 NOTE — PROGRESS NOTES
Physical Therapy Daily Progress Note        Patient: Wei Salgado   : 1965  Diagnosis/ICD-10 Code:  Lumbar spine painful on movement [M54.5]  Referring practitioner: Bernabe Costello,*  Date of Initial Visit: Type: THERAPY  Noted: 7/15/2021  Today's Date: 2021  Patient seen for 14 sessions         Wei Salgado reports: he was doing better but this morning he had some pain in the (R) low back.   He took some ibuprofen.  He rated his pain 2-3/10.  He said he has been working 7 days a week for 3 weeks now.  He reported sometimes he goes up a ladder and his feet hit the rung when they get tired.        Subjective Evaluation    Pain  Pain scale: 2-3/10.  At best pain ratin (with sitting resting in recliner)  Pain scale at highest: 3-4/10.       Back Index=14  LEFS=59/80    Objective          Strength/Myotome Testing     Left Hip   Planes of Motion   Flexion: 5  Extension: 5  Abduction: 5  Adduction: 5  External rotation: 4  Internal rotation: 5    Isolated Muscles   Gluteus van: 4+    Right Hip   Planes of Motion   Flexion: 5  Extension: 5  Abduction: 5  Adduction: 5  External rotation: 4  Internal rotation: 5    Isolated Muscles   Gluteus maximums: 5    Left Knee   Flexion: 4+  Extension: 5    Right Knee   Flexion: 4+  Extension: 5    Left Ankle/Foot   Dorsiflexion: 4+  Plantar flexion: 2+    Right Ankle/Foot   Dorsiflexion: 4+  Plantar flexion: 2+    Additional Strength Details  Upper Abs=3-/5, Lower Abs=4/5  Unable to complete a heel raise through full ROM on either LE    Ambulation     Observational Gait     Additional Observational Gait Details  Absent toe off (B) LE    Functional Assessment     Single Leg Stance   Left: 3 seconds  Right: 8 seconds      See Exercise, Manual, and Modality Logs for complete treatment.       Assessment/Plan  Pt demonstrated good hip and knee strength with only limitations being present with hamstring and hip ER strength.  Continued core weakness also  noted.  Significant limitation noted in PF strength (B) as pt unable to perform a single heel raise which affects functional mobility and gait mechanics as pt demonstrated lack of toe off with gait (B).  He also demonstrated decreased balance (B).  These deficits increase his risk of falls especially with physical job that requires him to lift and carry heavy objects over uneven surfaces as well as perform floor transfers.  Added PF strengthening and progressed core strengthening and balance focus this date.  Updated HEP to include ankle strengthening and balance.  Plan to assess ankle INV/EV strength next session as this may also be a limiting factor with balance.  Will continue to progress core and ankle strength as well as balance to decrease risk of falls and pain.      Progress per Plan of Care           Manual Therapy:         mins  63062;  Therapeutic Exercise:    30     mins  31182;     Neuromuscular Arielle:        mins  70153;    Therapeutic Activity:     30     mins  62125;     Gait Training:           mins  86299;     Ultrasound:          mins  73827;    Electrical Stimulation:         mins  01117 ( );  Dry Needling          mins self-pay    Timed Treatment:   60   mins   Total Treatment:     60   mins    Loraine Juárez PTA  Physical Therapist Assistant

## 2021-09-21 NOTE — PATIENT INSTRUCTIONS
Access Code: [email or web address]  URL: [email or web address]  Date: 09/21/2021  Prepared by: Loraine Juárez    Exercises  Supine Lower Trunk Rotation - 1 x daily - 7 x weekly - 10 reps  Supine Piriformis Stretch with Foot on Ground - 1 x daily - 7 x weekly - 3 sets - 10 hold  Supine Single Knee to Chest Stretch - 1 x daily - 7 x weekly - 3 sets - 10 hold  Supine Bridge - 1 x daily - 7 x weekly - 15 reps - 3 hold  Supine Hamstring Curl on Swiss Ball - 1 x daily - 7 x weekly - 20 reps  Standing Anti-Rotation Press with Anchored Resistance - 1 x daily - 7 x weekly - 20 reps  Standing Shoulder Row with Anchored Resistance - 1 x daily - 7 x weekly - 20 reps  Side Stepping with Resistance at Ankles - 1 x daily - 7 x weekly  Forward Monster Walks - 1 x daily - 7 x weekly  Standing Hip Abduction - 1 x daily - 7 x weekly - 10 reps  Push Up on Table - 1 x daily - 7 x weekly - 20 reps  Runner's Step Up/Down - 1 x daily - 7 x weekly - 15 reps  Mini Squat with Counter Support - 1 x daily - 7 x weekly - 15 reps  Standing Diagonal Chops with Medicine Ball - 1 x daily - 7 x weekly - 10 reps  Bird Dog - 1 x daily - 7 x weekly - 10 reps  Standing Hamstring Stretch with Step - 1 x daily - 7 x weekly - 3 sets - 10 hold  Supine Piriformis Stretch with Foot on Ground - 1 x daily - 7 x weekly - 3 sets - 10 hold  Supine Figure 4 Piriformis Stretch - 1 x daily - 7 x weekly - 3 sets - 10 hold  Seated forward flexion stretch - 1 x daily - 7 x weekly - 5 sets - 10 hold  Heel rises with counter support - 1 x daily - 7 x weekly - 1 sets - 15 reps  Long Sitting Ankle Dorsiflexion with Anchored Resistance - 1 x daily - 7 x weekly - 1 sets - 20 reps  Long Sitting Ankle Plantar Flexion with Resistance - 1 x daily - 7 x weekly - 1 sets - 20 reps  Single Leg Stance - 1 x daily - 7 x weekly - 1 sets - 3 reps - 30 hold

## 2021-09-22 ENCOUNTER — OFFICE VISIT (OUTPATIENT)
Dept: FAMILY MEDICINE CLINIC | Facility: CLINIC | Age: 56
End: 2021-09-22

## 2021-09-22 VITALS
BODY MASS INDEX: 37.11 KG/M2 | DIASTOLIC BLOOD PRESSURE: 100 MMHG | OXYGEN SATURATION: 98 % | HEART RATE: 77 BPM | SYSTOLIC BLOOD PRESSURE: 150 MMHG | WEIGHT: 280 LBS | HEIGHT: 73 IN

## 2021-09-22 DIAGNOSIS — R31.9 HEMATURIA, UNSPECIFIED TYPE: Primary | ICD-10-CM

## 2021-09-22 DIAGNOSIS — E66.01 MORBID (SEVERE) OBESITY DUE TO EXCESS CALORIES (HCC): ICD-10-CM

## 2021-09-22 DIAGNOSIS — I10 ESSENTIAL HYPERTENSION: ICD-10-CM

## 2021-09-22 DIAGNOSIS — N20.0 RENAL STONES: ICD-10-CM

## 2021-09-22 DIAGNOSIS — R33.9 URINARY RETENTION: ICD-10-CM

## 2021-09-22 LAB
BILIRUB BLD-MCNC: ABNORMAL MG/DL
CLARITY, POC: CLEAR
COLOR UR: ABNORMAL
GLUCOSE UR STRIP-MCNC: NEGATIVE MG/DL
KETONES UR QL: NEGATIVE
LEUKOCYTE EST, POC: NEGATIVE
NITRITE UR-MCNC: NEGATIVE MG/ML
PH UR: 6 [PH] (ref 5–8)
PROT UR STRIP-MCNC: ABNORMAL MG/DL
RBC # UR STRIP: ABNORMAL /UL
SP GR UR: 1.03 (ref 1–1.03)
UROBILINOGEN UR QL: NORMAL

## 2021-09-22 PROCEDURE — 99214 OFFICE O/P EST MOD 30 MIN: CPT | Performed by: FAMILY MEDICINE

## 2021-09-22 RX ORDER — LOSARTAN POTASSIUM AND HYDROCHLOROTHIAZIDE 25; 100 MG/1; MG/1
1 TABLET ORAL DAILY
Qty: 90 TABLET | Refills: 1 | Status: SHIPPED | OUTPATIENT
Start: 2021-09-22 | End: 2021-12-22 | Stop reason: SDUPTHER

## 2021-09-22 RX ORDER — TAMSULOSIN HYDROCHLORIDE 0.4 MG/1
1 CAPSULE ORAL DAILY
Qty: 30 CAPSULE | Refills: 1 | Status: SHIPPED | OUTPATIENT
Start: 2021-09-22 | End: 2021-10-17

## 2021-09-22 NOTE — PROGRESS NOTES
Chief Complaint   Patient presents with   • Urinary Tract Infection     possible       Subjective   Wei Salgado is a 56 y.o. male.     History of Present Illness   The 10-year ASCVD risk score (Emden VIDHI Jr., et al., 2013) is: 13.5%    Values used to calculate the score:      Age: 56 years      Sex: Male      Is Non- : No      Diabetic: No      Tobacco smoker: No      Systolic Blood Pressure: 150 mmHg      Is BP treated: Yes      HDL Cholesterol: 43 mg/dL      Total Cholesterol: 251 mg/dL    Hyperlipidemia: Has declined statin.      Body mass index is 36.95 kg/m².      Hypertension: Has been slowly increasing.  On losartan HCTZ 100/12.5, asymptomatic.  Blood pressure in the lower 150s or 140s at home    Has noted some urinary frequency at home.  History of renal stones.  No history of any prostate issues in the past.    The following portions of the patient's history were reviewed and updated as appropriate: allergies, current medications, past family history, past medical history, past social history, past surgical history and problem list.      Past Medical History:   Diagnosis Date   • Allergic    • Arthritis    • Headache    • Hyperlipidemia    • Hypertension    • Sleep apnea     USES CPAP        Past Surgical History:   Procedure Laterality Date   • COLONOSCOPY N/A 11/20/2020    Procedure: COLONOSCOPY INTO CECUM;  Surgeon: Jesse Frey MD;  Location: [facility] ENDOSCOPY;  Service: Gastroenterology;  Laterality: N/A;  PRE: SCREENING  POST: HEMORRHOIDS   • HAND SURGERY  1977   • LUMBAR LAMINECTOMY DISCECTOMY DECOMPRESSION Bilateral 6/28/2021    Procedure: Lumbar laminectomy lumbar 3 lumbar 4 for bilateral decompression;  Surgeon: Bernabe Costello MD;  Location: [facility] MAIN OR;  Service: Neurosurgery;  Laterality: Bilateral;   • WISDOM TOOTH EXTRACTION         Family History   Problem Relation Age of Onset   • Arthritis Mother    • Mental illness Mother    • Stroke Father     • No Known Problems Sister    • No Known Problems Brother    • No Known Problems Brother    • No Known Problems Brother    • Malig Hyperthermia Neg Hx        Social History     Socioeconomic History   • Marital status:      Spouse name: Not on file   • Number of children: Not on file   • Years of education: Not on file   • Highest education level: Not on file   Tobacco Use   • Smoking status: Never Smoker   • Smokeless tobacco: Never Used   Vaping Use   • Vaping Use: Never used   Substance and Sexual Activity   • Alcohol use: Yes     Comment: very seldom   • Drug use: Never   • Sexual activity: Defer       Current Outpatient Medications on File Prior to Visit   Medication Sig Dispense Refill   • acetaminophen (TYLENOL) 500 MG tablet Take 500 mg by mouth Every 6 (Six) Hours As Needed for Mild Pain .     • [DISCONTINUED] losartan-hydrochlorothiazide (Hyzaar) 100-12.5 MG per tablet Take 1 tablet by mouth Daily. 90 tablet 1   • cyclobenzaprine (FLEXERIL) 10 MG tablet Take 1 tablet by mouth 3 (Three) Times a Day As Needed for Muscle Spasms. 60 tablet 3     No current facility-administered medications on file prior to visit.       Review of Systems   Constitutional: Negative for activity change, chills and fever.   HENT: Negative for congestion, postnasal drip and rhinorrhea.    Eyes: Negative for blurred vision and pain.   Respiratory: Negative for cough, chest tightness and shortness of breath.    Cardiovascular: Negative for chest pain.   Gastrointestinal: Negative for abdominal pain, constipation, diarrhea, nausea and vomiting.   Endocrine: Negative for cold intolerance and heat intolerance.   Genitourinary: Negative for decreased urine volume, dysuria and frequency.   Musculoskeletal: Negative for arthralgias, back pain and myalgias.   Skin: Negative for rash and skin lesions.   Neurological: Negative for dizziness and confusion.   Psychiatric/Behavioral: Negative for agitation, behavioral problems and  depressed mood.           Vitals:    09/22/21 1054   BP: 150/100   Pulse: 77   SpO2: 98%      Objective   Physical Exam  Vitals and nursing note reviewed.   Constitutional:       Appearance: He is well-developed.   HENT:      Head: Normocephalic and atraumatic.   Eyes:      Conjunctiva/sclera: Conjunctivae normal.      Pupils: Pupils are equal, round, and reactive to light.   Cardiovascular:      Rate and Rhythm: Normal rate and regular rhythm.      Heart sounds: Normal heart sounds. No murmur heard.     Pulmonary:      Effort: Pulmonary effort is normal. No respiratory distress.      Breath sounds: Normal breath sounds.   Abdominal:      General: Bowel sounds are normal.      Palpations: Abdomen is soft.   Musculoskeletal:         General: Normal range of motion.      Cervical back: Neck supple.   Skin:     General: Skin is warm and dry.   Neurological:      Mental Status: He is alert and oriented to person, place, and time.           Assessment/Plan   Problems Addressed this Visit     None      Visit Diagnoses     Hematuria, unspecified type    -  Primary    Relevant Medications    losartan-hydrochlorothiazide (Hyzaar) 100-25 MG per tablet    tamsulosin (FLOMAX) 0.4 MG capsule 24 hr capsule    Other Relevant Orders    POC Urinalysis Dipstick (Completed)    Urine Culture - Urine, Urine, Clean Catch    Ambulatory Referral to Urology    Essential hypertension        Relevant Medications    losartan-hydrochlorothiazide (Hyzaar) 100-25 MG per tablet    Morbid (severe) obesity due to excess calories (CMS/HCC)        Relevant Orders    Ambulatory Referral to Nutrition Services    Renal stones        Relevant Medications    tamsulosin (FLOMAX) 0.4 MG capsule 24 hr capsule    Other Relevant Orders    Ambulatory Referral to Urology    Urinary retention        Relevant Medications    tamsulosin (FLOMAX) 0.4 MG capsule 24 hr capsule      Diagnoses       Codes Comments    Hematuria, unspecified type    -  Primary ICD-10-CM:  R31.9  ICD-9-CM: 599.70     Essential hypertension     ICD-10-CM: I10  ICD-9-CM: 401.9     Morbid (severe) obesity due to excess calories (CMS/HCC)     ICD-10-CM: E66.01  ICD-9-CM: 278.01     Renal stones     ICD-10-CM: N20.0  ICD-9-CM: 592.0     Urinary retention     ICD-10-CM: R33.9  ICD-9-CM: 788.20         UA with blood.  May trial Flomax will refer to urology urine culture pending    Hypertension: Switch to losartan hydrochlorothiazide 100/25 and may add in amlodipine       Return in about 1 month (around 10/22/2021), or 30 min HTN check.      Shayna Messina MD

## 2021-09-24 ENCOUNTER — APPOINTMENT (OUTPATIENT)
Dept: CT IMAGING | Facility: HOSPITAL | Age: 56
End: 2021-09-24

## 2021-09-24 ENCOUNTER — HOSPITAL ENCOUNTER (EMERGENCY)
Facility: HOSPITAL | Age: 56
Discharge: HOME | End: 2021-09-24
Attending: EMERGENCY MEDICINE | Admitting: EMERGENCY MEDICINE

## 2021-09-24 VITALS
DIASTOLIC BLOOD PRESSURE: 99 MMHG | RESPIRATION RATE: 16 BRPM | SYSTOLIC BLOOD PRESSURE: 140 MMHG | HEART RATE: 66 BPM | HEIGHT: 73 IN | TEMPERATURE: 98.1 F | WEIGHT: 280 LBS | BODY MASS INDEX: 37.11 KG/M2 | OXYGEN SATURATION: 96 %

## 2021-09-24 DIAGNOSIS — N20.0 KIDNEY STONE ON LEFT SIDE: Primary | ICD-10-CM

## 2021-09-24 DIAGNOSIS — E87.6 HYPOKALEMIA: ICD-10-CM

## 2021-09-24 LAB
ANION GAP SERPL CALCULATED.3IONS-SCNC: 7.1 MMOL/L (ref 5–15)
BACTERIA UR CULT: NO GROWTH
BACTERIA UR CULT: NORMAL
BACTERIA UR QL AUTO: ABNORMAL /HPF
BASOPHILS # BLD AUTO: 0.04 10*3/MM3 (ref 0–0.2)
BASOPHILS NFR BLD AUTO: 0.4 % (ref 0–1.5)
BILIRUB UR QL STRIP: NEGATIVE
BUN SERPL-MCNC: 20 MG/DL (ref 6–20)
BUN/CREAT SERPL: 22 (ref 7–25)
CALCIUM SPEC-SCNC: 8.8 MG/DL (ref 8.6–10.5)
CHLORIDE SERPL-SCNC: 102 MMOL/L (ref 98–107)
CLARITY UR: CLEAR
CO2 SERPL-SCNC: 28.9 MMOL/L (ref 22–29)
COD CRY URNS QL: ABNORMAL /HPF
COLOR UR: YELLOW
CREAT SERPL-MCNC: 0.91 MG/DL (ref 0.76–1.27)
DEPRECATED RDW RBC AUTO: 44.3 FL (ref 37–54)
EOSINOPHIL # BLD AUTO: 0.08 10*3/MM3 (ref 0–0.4)
EOSINOPHIL NFR BLD AUTO: 0.9 % (ref 0.3–6.2)
ERYTHROCYTE [DISTWIDTH] IN BLOOD BY AUTOMATED COUNT: 13 % (ref 12.3–15.4)
GFR SERPL CREATININE-BSD FRML MDRD: 86 ML/MIN/1.73
GLUCOSE SERPL-MCNC: 115 MG/DL (ref 65–99)
GLUCOSE UR STRIP-MCNC: NEGATIVE MG/DL
HCT VFR BLD AUTO: 41.4 % (ref 37.5–51)
HGB BLD-MCNC: 13.7 G/DL (ref 13–17.7)
HGB UR QL STRIP.AUTO: ABNORMAL
HYALINE CASTS UR QL AUTO: ABNORMAL /LPF
IMM GRANULOCYTES # BLD AUTO: 0.02 10*3/MM3 (ref 0–0.05)
IMM GRANULOCYTES NFR BLD AUTO: 0.2 % (ref 0–0.5)
KETONES UR QL STRIP: NEGATIVE
LEUKOCYTE ESTERASE UR QL STRIP.AUTO: NEGATIVE
LYMPHOCYTES # BLD AUTO: 1.25 10*3/MM3 (ref 0.7–3.1)
LYMPHOCYTES NFR BLD AUTO: 13.8 % (ref 19.6–45.3)
MCH RBC QN AUTO: 29.9 PG (ref 26.6–33)
MCHC RBC AUTO-ENTMCNC: 33.1 G/DL (ref 31.5–35.7)
MCV RBC AUTO: 90.4 FL (ref 79–97)
MONOCYTES # BLD AUTO: 0.63 10*3/MM3 (ref 0.1–0.9)
MONOCYTES NFR BLD AUTO: 7 % (ref 5–12)
NEUTROPHILS NFR BLD AUTO: 7.04 10*3/MM3 (ref 1.7–7)
NEUTROPHILS NFR BLD AUTO: 77.7 % (ref 42.7–76)
NITRITE UR QL STRIP: NEGATIVE
PH UR STRIP.AUTO: 7 [PH] (ref 4.5–8)
PLATELET # BLD AUTO: 250 10*3/MM3 (ref 140–450)
PMV BLD AUTO: 10.7 FL (ref 6–12)
POTASSIUM SERPL-SCNC: 3.3 MMOL/L (ref 3.5–5.2)
PROT UR QL STRIP: NEGATIVE
RBC # BLD AUTO: 4.58 10*6/MM3 (ref 4.14–5.8)
RBC # UR: ABNORMAL /HPF
REF LAB TEST METHOD: ABNORMAL
SODIUM SERPL-SCNC: 138 MMOL/L (ref 136–145)
SP GR UR STRIP: 1.02 (ref 1–1.03)
SQUAMOUS #/AREA URNS HPF: ABNORMAL /HPF
UROBILINOGEN UR QL STRIP: ABNORMAL
WBC # BLD AUTO: 9.06 10*3/MM3 (ref 3.4–10.8)
WBC UR QL AUTO: ABNORMAL /HPF

## 2021-09-24 PROCEDURE — 99283 EMERGENCY DEPT VISIT LOW MDM: CPT

## 2021-09-24 PROCEDURE — 96374 THER/PROPH/DIAG INJ IV PUSH: CPT

## 2021-09-24 PROCEDURE — 74176 CT ABD & PELVIS W/O CONTRAST: CPT

## 2021-09-24 PROCEDURE — 25010000002 KETOROLAC TROMETHAMINE PER 15 MG: Performed by: EMERGENCY MEDICINE

## 2021-09-24 PROCEDURE — 80048 BASIC METABOLIC PNL TOTAL CA: CPT | Performed by: EMERGENCY MEDICINE

## 2021-09-24 PROCEDURE — 85025 COMPLETE CBC W/AUTO DIFF WBC: CPT | Performed by: EMERGENCY MEDICINE

## 2021-09-24 PROCEDURE — 96375 TX/PRO/DX INJ NEW DRUG ADDON: CPT

## 2021-09-24 PROCEDURE — 99283 EMERGENCY DEPT VISIT LOW MDM: CPT | Performed by: EMERGENCY MEDICINE

## 2021-09-24 PROCEDURE — 25010000002 ONDANSETRON PER 1 MG: Performed by: EMERGENCY MEDICINE

## 2021-09-24 PROCEDURE — 81001 URINALYSIS AUTO W/SCOPE: CPT | Performed by: EMERGENCY MEDICINE

## 2021-09-24 RX ORDER — POTASSIUM CHLORIDE 750 MG/1
20 TABLET, FILM COATED, EXTENDED RELEASE ORAL DAILY
Qty: 14 TABLET | Refills: 0 | Status: SHIPPED | OUTPATIENT
Start: 2021-09-24 | End: 2021-10-01

## 2021-09-24 RX ORDER — ONDANSETRON 2 MG/ML
4 INJECTION INTRAMUSCULAR; INTRAVENOUS ONCE
Status: COMPLETED | OUTPATIENT
Start: 2021-09-24 | End: 2021-09-24

## 2021-09-24 RX ORDER — SODIUM CHLORIDE 0.9 % (FLUSH) 0.9 %
10 SYRINGE (ML) INJECTION AS NEEDED
Status: DISCONTINUED | OUTPATIENT
Start: 2021-09-24 | End: 2021-09-24 | Stop reason: HOSPADM

## 2021-09-24 RX ORDER — OXYCODONE HYDROCHLORIDE AND ACETAMINOPHEN 5; 325 MG/1; MG/1
1 TABLET ORAL EVERY 6 HOURS PRN
Qty: 25 TABLET | Refills: 0 | Status: SHIPPED | OUTPATIENT
Start: 2021-09-24 | End: 2021-10-17

## 2021-09-24 RX ORDER — ONDANSETRON 4 MG/1
4 TABLET, ORALLY DISINTEGRATING ORAL EVERY 6 HOURS PRN
Qty: 20 TABLET | Refills: 0 | Status: SHIPPED | OUTPATIENT
Start: 2021-09-24 | End: 2021-10-17

## 2021-09-24 RX ORDER — KETOROLAC TROMETHAMINE 30 MG/ML
30 INJECTION, SOLUTION INTRAMUSCULAR; INTRAVENOUS ONCE
Status: COMPLETED | OUTPATIENT
Start: 2021-09-24 | End: 2021-09-24

## 2021-09-24 RX ADMIN — ONDANSETRON 4 MG: 2 INJECTION INTRAMUSCULAR; INTRAVENOUS at 08:30

## 2021-09-24 RX ADMIN — KETOROLAC TROMETHAMINE 30 MG: 30 INJECTION, SOLUTION INTRAMUSCULAR; INTRAVENOUS at 08:30

## 2021-09-24 NOTE — DISCHARGE INSTRUCTIONS
Medications as directed.  Strain urine and collect stone.  Stone in specimen cup and take to follow-up with urology.  Recommend diet and potassium containing foods to your diet regularly.  Follow-up with urology as above.  Return to ED for uncontrolled pain, development of high fever, chills, other signs of systemic infection, medical emergencies.

## 2021-09-24 NOTE — ED NOTES
Pt stated he was prescribed flomax on 9/22 but has not gotten it filled yet     Palma Martin, RN  09/24/21 0751

## 2021-09-24 NOTE — ED PROVIDER NOTES
Subjective   Wei Salgado is a 55 yo white male who presents secondary to flank pain and difficulty urinating.  Onset of symptoms 2 weeks ago.  Patient was concerned that he might have a kidney stone.  Patient had his first kidney stone in May of this year.  Patient was recently seen by his PCP.  There was blood present but no bacteria.  PCP set up an appointment with a urologist which is scheduled on 9/28.  Patient's flank pain has been worsening.  Thus he elected to come to the ED for evaluation.  No nausea or vomiting with this suspected kidney stone      History provided by:  Patient      Review of Systems   Constitutional: Negative for fever.   HENT: Negative for rhinorrhea.    Eyes: Negative for redness.   Respiratory: Negative for cough.    Cardiovascular: Negative for chest pain.   Gastrointestinal: Negative for abdominal pain.   Genitourinary: Positive for difficulty urinating and flank pain. Negative for dysuria.   Musculoskeletal: Negative for back pain.   Skin: Negative for rash.   Neurological: Positive for headaches. Negative for syncope.   All other systems reviewed and are negative.      Past Medical History:   Diagnosis Date   • Allergic    • Arthritis    • Headache    • Hyperlipidemia    • Hypertension    • Sleep apnea     USES CPAP        No Known Allergies    Past Surgical History:   Procedure Laterality Date   • COLONOSCOPY N/A 11/20/2020    Procedure: COLONOSCOPY INTO CECUM;  Surgeon: Jesse Frey MD;  Location: [facility] ENDOSCOPY;  Service: Gastroenterology;  Laterality: N/A;  PRE: SCREENING  POST: HEMORRHOIDS   • HAND SURGERY  1977   • LUMBAR LAMINECTOMY DISCECTOMY DECOMPRESSION Bilateral 6/28/2021    Procedure: Lumbar laminectomy lumbar 3 lumbar 4 for bilateral decompression;  Surgeon: Bernabe Costello MD;  Location: [facility] OR;  Service: Neurosurgery;  Laterality: Bilateral;   • WISDOM TOOTH EXTRACTION         Family History   Problem Relation Age of Onset   • Arthritis  Mother    • Mental illness Mother    • Stroke Father    • No Known Problems Sister    • No Known Problems Brother    • No Known Problems Brother    • No Known Problems Brother    • Malig Hyperthermia Neg Hx        Social History     Socioeconomic History   • Marital status:      Spouse name: Not on file   • Number of children: Not on file   • Years of education: Not on file   • Highest education level: Not on file   Tobacco Use   • Smoking status: Never Smoker   • Smokeless tobacco: Never Used   Vaping Use   • Vaping Use: Never used   Substance and Sexual Activity   • Alcohol use: Yes     Comment: very seldom   • Drug use: Never   • Sexual activity: Defer           Objective   Physical Exam  Vitals and nursing note reviewed.   Constitutional:       General: He is not in acute distress.     Appearance: Normal appearance. He is well-developed. He is obese. He is not ill-appearing, toxic-appearing or diaphoretic.      Comments: 56-year-old white male laying in bed. Patient is a bit overweight. He otherwise appears in good health. Vital signs notable for BP of 156/100. Otherwise unremarkable.   HENT:      Head: Normocephalic and atraumatic.      Right Ear: Tympanic membrane, ear canal and external ear normal.      Left Ear: Tympanic membrane, ear canal and external ear normal.      Nose: Nose normal.      Mouth/Throat:      Mouth: Mucous membranes are moist.      Pharynx: Oropharynx is clear.   Eyes:      Extraocular Movements: Extraocular movements intact.      Conjunctiva/sclera: Conjunctivae normal.      Pupils: Pupils are equal, round, and reactive to light.   Cardiovascular:      Rate and Rhythm: Normal rate and regular rhythm.      Pulses: Normal pulses.      Heart sounds: Normal heart sounds. No murmur heard.   No friction rub. No gallop.    Pulmonary:      Effort: Pulmonary effort is normal. No respiratory distress.      Breath sounds: Normal breath sounds. No stridor. No wheezing or rales.   Abdominal:       General: Bowel sounds are normal. There is no distension.      Palpations: Abdomen is soft. There is no mass.      Tenderness: There is no abdominal tenderness. There is no guarding or rebound.      Hernia: No hernia is present.   Musculoskeletal:         General: Normal range of motion.      Cervical back: Normal range of motion and neck supple.   Skin:     General: Skin is warm and dry.      Findings: No erythema or rash.   Neurological:      General: No focal deficit present.      Mental Status: He is alert and oriented to person, place, and time.      Cranial Nerves: No cranial nerve deficit.      Deep Tendon Reflexes: Reflexes are normal and symmetric.   Psychiatric:         Mood and Affect: Mood normal.         Behavior: Behavior normal.         Procedures           ED Course  ED Course as of Sep 24 1454   Fri Sep 24, 2021   0802 Patient had his first kidney stone in March of this year.  He thinks he is trying to pass another.  Getting baseline labs and CT of the abdomen pelvis renal stone protocol.  Patient drove himself to the ER.  Thus giving Toradol and Zofran.  Patient does not appear in significant distress currently.    [SS]   0857 CBC unremarkable.  BMP pending.  UA shows trace blood.  Otherwise unremarkable.    [SS]   0917 Patient feeling better.  Resting comfortably in bed.  Responded well to Toradol.  Awaiting BMP and CT reading.    [SS]   0948 BMP shows potassium 3.3.  Otherwise unremarkable.  CT shows a 6 mm stone at left UVJ with associated hydro-.  This is obviously the source of patient's pain.  Prescribing pain and nausea medicines for home as well as Flomax.  Patient scheduled to see urologist on the 28th.  I am calling urology.    [SS]   1001 Discussed with Dr. Seymour Valentin.  Patient to keep his appointment on the 28th unless symptoms worsen.I have discussed at length with patient (including family if appropriate) all results, diagnoses, treatment, indications to return to emergency  room and follow-up.  Will d/c home.    [id number]-Iuuksq9-Flomax4-potassium tablets    [SS]      ED Course User Index  [SS] Rudi Whalen MD      Labs Reviewed   URINALYSIS W/ CULTURE IF INDICATED - Abnormal; Notable for the following components:       Result Value    Blood, UA Trace (*)     All other components within normal limits   URINALYSIS, MICROSCOPIC ONLY - Abnormal; Notable for the following components:    RBC, UA 3-5 (*)     All other components within normal limits   BASIC METABOLIC PANEL - Abnormal; Notable for the following components:    Glucose 115 (*)     Potassium 3.3 (*)     All other components within normal limits    Narrative:     GFR Normal >60  Chronic Kidney Disease <60  Kidney Failure <15     CBC WITH AUTO DIFFERENTIAL - Abnormal; Notable for the following components:    Neutrophil % 77.7 (*)     Lymphocyte % 13.8 (*)     Neutrophils, Absolute 7.04 (*)     All other components within normal limits   CBC AND DIFFERENTIAL    Narrative:     The following orders were created for panel order CBC & Differential.  Procedure                               Abnormality         Status                     ---------                               -----------         ------                     CBC Auto Differential[[id number]]        Abnormal            Final result                 Please view results for these tests on the individual orders.     CT Abdomen Pelvis Without Contrast    Result Date: 9/24/2021  Narrative: CT Abdomen Pelvis WO INDICATION: Right-sided abdominal pain for 2 weeks. Past history of kidney stones. TECHNIQUE: CT of the abdomen and pelvis without IV contrast. Coronal and sagittal reconstructions were obtained.  Radiation dose reduction techniques included automated exposure control or exposure modulation based on body size. Count of known CT and cardiac nuc med studies performed in previous 12 months: 1. COMPARISON: 5/1/2021 FINDINGS: Abdomen: Liver, spleen and pancreas  are normal in size. The right kidney is unremarkable. The left kidney is edematous with moderate hydronephrosis. The left ureter is dilated down to the UVJ where there is a 6 mm obstructing stone. No evidence of retroperitoneal adenopathy. No distended bowel loops. Normal appendix. Pelvis: No evidence of adenopathy, mass or fluid collection.     Impression: There is a 6 mm obstructing stone at the left UVJ with moderate hydronephrosis and hydroureter. Moderate edematous changes are seen around the left kidney. Signer Name: Gutierrez Walden MD  Signed: 9/24/2021 9:25 AM  Workstation Name: [id number]  [facility]         My differential diagnosis for abdominal pain includes but is not limited to:  Gastritis, gastroenteritis, peptic ulcer disease, GERD, esophageal perforation, acute appendicitis, mesenteric adenitis, Meckel’s diverticulum, epiploic appendagitis, diverticulitis, colon cancer, ulcerative colitis, Crohn’s disease, intussusception, small bowel obstruction, adhesions, ischemic bowel, perforated viscus, ileus, obstipation, biliary colic, cholecystitis, cholelithiasis, Nico-Winston Rick, hepatitis, pancreatitis, common bile duct obstruction, cholangitis, bile leak, splenic trauma, splenic rupture, splenic infarction, splenic abscess, abdominal abscess, ascites, spontaneous bacterial peritonitis, hernia, UTI, cystitis, prostatitis, ureterolithiasis, urinary obstruction, AAA, myocardial infarction, pneumonia, cancer, porphyria, DKA, medications, sickle cell, viral syndrome, zoster                                  MDM    Final diagnoses:   Kidney stone on left side   Hypokalemia       ED Disposition  ED Disposition     ED Disposition Condition Comment    Discharge Stable           Jethro Valentin MD  [facility] [address] [address]  [phone]    Go to   As scheduled on 9/28/2021.  Sooner for worsening symptoms.         Medication List      New Prescriptions    ondansetron  ODT 4 MG disintegrating tablet  Commonly known as: Zofran ODT  Place 1 tablet on the tongue Every 6 (Six) Hours As Needed for Nausea or Vomiting for up to 20 doses.     oxyCODONE-acetaminophen 5-325 MG per tablet  Commonly known as: PERCOCET  Take 1 tablet by mouth Every 6 (Six) Hours As Needed for Moderate Pain  or Severe Pain  for up to 25 doses.     potassium chloride 10 MEQ CR tablet  Take 2 tablets by mouth Daily for 7 days.           Where to Get Your Medications      These medications were sent to [facility] [address] [place] [address] - [phone]  - [phone] [address] [address]    Phone: [phone]   · ondansetron ODT 4 MG disintegrating tablet  · oxyCODONE-acetaminophen 5-325 MG per tablet  · potassium chloride 10 MEQ CR tablet          Rudi Whalen MD  09/24/21 4268

## 2021-09-27 ENCOUNTER — ANESTHESIA EVENT (OUTPATIENT)
Dept: PERIOP | Facility: HOSPITAL | Age: 56
End: 2021-09-27

## 2021-09-27 ENCOUNTER — HOSPITAL ENCOUNTER (OUTPATIENT)
Facility: HOSPITAL | Age: 56
Setting detail: HOSPITAL OUTPATIENT SURGERY
Discharge: HOME | End: 2021-09-27
Attending: UROLOGY | Admitting: UROLOGY

## 2021-09-27 ENCOUNTER — APPOINTMENT (OUTPATIENT)
Dept: GENERAL RADIOLOGY | Facility: HOSPITAL | Age: 56
End: 2021-09-27

## 2021-09-27 ENCOUNTER — ANESTHESIA (OUTPATIENT)
Dept: PERIOP | Facility: HOSPITAL | Age: 56
End: 2021-09-27

## 2021-09-27 VITALS
OXYGEN SATURATION: 98 % | HEIGHT: 73 IN | HEART RATE: 94 BPM | TEMPERATURE: 98 F | DIASTOLIC BLOOD PRESSURE: 96 MMHG | BODY MASS INDEX: 37.03 KG/M2 | RESPIRATION RATE: 16 BRPM | WEIGHT: 279.4 LBS | SYSTOLIC BLOOD PRESSURE: 128 MMHG

## 2021-09-27 DIAGNOSIS — N13.2 URETERAL STONE WITH HYDRONEPHROSIS: Primary | ICD-10-CM

## 2021-09-27 DIAGNOSIS — N20.1 CALCULUS OF URETER: ICD-10-CM

## 2021-09-27 LAB — SARS-COV-2 RNA PNL SPEC NAA+PROBE: NOT DETECTED

## 2021-09-27 PROCEDURE — C9803 HOPD COVID-19 SPEC COLLECT: HCPCS

## 2021-09-27 PROCEDURE — C1769 GUIDE WIRE: HCPCS | Performed by: UROLOGY

## 2021-09-27 PROCEDURE — C1758 CATHETER, URETERAL: HCPCS | Performed by: UROLOGY

## 2021-09-27 PROCEDURE — 25010000002 IOPAMIDOL 61 % SOLUTION: Performed by: UROLOGY

## 2021-09-27 PROCEDURE — 25010000002 FENTANYL CITRATE (PF) 50 MCG/ML SOLUTION: Performed by: REGISTERED NURSE

## 2021-09-27 PROCEDURE — 87635 SARS-COV-2 COVID-19 AMP PRB: CPT | Performed by: UROLOGY

## 2021-09-27 PROCEDURE — C2617 STENT, NON-COR, TEM W/O DEL: HCPCS | Performed by: UROLOGY

## 2021-09-27 PROCEDURE — 25010000002 MIDAZOLAM PER 1MG: Performed by: REGISTERED NURSE

## 2021-09-27 PROCEDURE — 25010000003 CEFAZOLIN SODIUM-DEXTROSE 2-3 GM-%(50ML) RECONSTITUTED SOLUTION: Performed by: UROLOGY

## 2021-09-27 PROCEDURE — 74420 UROGRAPHY RTRGR +-KUB: CPT

## 2021-09-27 PROCEDURE — 25010000002 DEXAMETHASONE PER 1 MG: Performed by: REGISTERED NURSE

## 2021-09-27 PROCEDURE — 25010000002 PROPOFOL 10 MG/ML EMULSION: Performed by: REGISTERED NURSE

## 2021-09-27 PROCEDURE — 25010000002 ONDANSETRON PER 1 MG: Performed by: REGISTERED NURSE

## 2021-09-27 DEVICE — URETERAL STENT
Type: IMPLANTABLE DEVICE | Site: URETER | Status: FUNCTIONAL
Brand: POLARIS™ ULTRA

## 2021-09-27 RX ORDER — DEXAMETHASONE SODIUM PHOSPHATE 4 MG/ML
8 INJECTION, SOLUTION INTRA-ARTICULAR; INTRALESIONAL; INTRAMUSCULAR; INTRAVENOUS; SOFT TISSUE ONCE AS NEEDED
Status: COMPLETED | OUTPATIENT
Start: 2021-09-27 | End: 2021-09-27

## 2021-09-27 RX ORDER — FENTANYL CITRATE 50 UG/ML
INJECTION, SOLUTION INTRAMUSCULAR; INTRAVENOUS AS NEEDED
Status: DISCONTINUED | OUTPATIENT
Start: 2021-09-27 | End: 2021-09-27 | Stop reason: SURG

## 2021-09-27 RX ORDER — EPHEDRINE SULFATE 50 MG/ML
INJECTION, SOLUTION INTRAVENOUS AS NEEDED
Status: DISCONTINUED | OUTPATIENT
Start: 2021-09-27 | End: 2021-09-27 | Stop reason: SURG

## 2021-09-27 RX ORDER — FAMOTIDINE 10 MG/ML
20 INJECTION, SOLUTION INTRAVENOUS
Status: COMPLETED | OUTPATIENT
Start: 2021-09-27 | End: 2021-09-27

## 2021-09-27 RX ORDER — CEPHALEXIN 250 MG/1
250 CAPSULE ORAL DAILY
Qty: 10 CAPSULE | Refills: 0 | Status: SHIPPED | OUTPATIENT
Start: 2021-09-27 | End: 2021-10-07

## 2021-09-27 RX ORDER — MIDAZOLAM HYDROCHLORIDE 2 MG/2ML
2 INJECTION, SOLUTION INTRAMUSCULAR; INTRAVENOUS
Status: DISCONTINUED | OUTPATIENT
Start: 2021-09-27 | End: 2021-09-27 | Stop reason: HOSPADM

## 2021-09-27 RX ORDER — MAGNESIUM HYDROXIDE 1200 MG/15ML
LIQUID ORAL AS NEEDED
Status: DISCONTINUED | OUTPATIENT
Start: 2021-09-27 | End: 2021-09-27 | Stop reason: HOSPADM

## 2021-09-27 RX ORDER — PROPOFOL 10 MG/ML
VIAL (ML) INTRAVENOUS AS NEEDED
Status: DISCONTINUED | OUTPATIENT
Start: 2021-09-27 | End: 2021-09-27 | Stop reason: SURG

## 2021-09-27 RX ORDER — ONDANSETRON 2 MG/ML
4 INJECTION INTRAMUSCULAR; INTRAVENOUS ONCE AS NEEDED
Status: COMPLETED | OUTPATIENT
Start: 2021-09-27 | End: 2021-09-27

## 2021-09-27 RX ORDER — PHENAZOPYRIDINE HYDROCHLORIDE 100 MG/1
100 TABLET, FILM COATED ORAL 3 TIMES DAILY PRN
Qty: 21 TABLET | Refills: 0 | Status: SHIPPED | OUTPATIENT
Start: 2021-09-27 | End: 2021-10-17

## 2021-09-27 RX ORDER — CEFAZOLIN SODIUM 2 G/50ML
2 SOLUTION INTRAVENOUS ONCE
Status: DISCONTINUED | OUTPATIENT
Start: 2021-09-27 | End: 2021-09-27

## 2021-09-27 RX ORDER — DEXMEDETOMIDINE HYDROCHLORIDE 100 UG/ML
INJECTION, SOLUTION INTRAVENOUS AS NEEDED
Status: DISCONTINUED | OUTPATIENT
Start: 2021-09-27 | End: 2021-09-27 | Stop reason: SURG

## 2021-09-27 RX ORDER — FENTANYL CITRATE 50 UG/ML
25 INJECTION, SOLUTION INTRAMUSCULAR; INTRAVENOUS
Status: DISCONTINUED | OUTPATIENT
Start: 2021-09-27 | End: 2021-09-27 | Stop reason: HOSPADM

## 2021-09-27 RX ORDER — GLYCOPYRROLATE 0.2 MG/ML
INJECTION INTRAMUSCULAR; INTRAVENOUS AS NEEDED
Status: DISCONTINUED | OUTPATIENT
Start: 2021-09-27 | End: 2021-09-27 | Stop reason: SURG

## 2021-09-27 RX ORDER — HYDROCODONE BITARTRATE AND ACETAMINOPHEN 7.5; 325 MG/1; MG/1
1 TABLET ORAL ONCE AS NEEDED
Status: DISCONTINUED | OUTPATIENT
Start: 2021-09-27 | End: 2021-09-27 | Stop reason: HOSPADM

## 2021-09-27 RX ORDER — KETAMINE HYDROCHLORIDE 100 MG/ML
INJECTION INTRAMUSCULAR; INTRAVENOUS AS NEEDED
Status: DISCONTINUED | OUTPATIENT
Start: 2021-09-27 | End: 2021-09-27 | Stop reason: SURG

## 2021-09-27 RX ORDER — SODIUM CHLORIDE, SODIUM LACTATE, POTASSIUM CHLORIDE, CALCIUM CHLORIDE 600; 310; 30; 20 MG/100ML; MG/100ML; MG/100ML; MG/100ML
100 INJECTION, SOLUTION INTRAVENOUS CONTINUOUS
Status: DISCONTINUED | OUTPATIENT
Start: 2021-09-27 | End: 2021-09-27 | Stop reason: HOSPADM

## 2021-09-27 RX ORDER — ONDANSETRON 2 MG/ML
4 INJECTION INTRAMUSCULAR; INTRAVENOUS ONCE AS NEEDED
Status: DISCONTINUED | OUTPATIENT
Start: 2021-09-27 | End: 2021-09-27 | Stop reason: HOSPADM

## 2021-09-27 RX ORDER — CEFAZOLIN SODIUM 2 G/50ML
2 SOLUTION INTRAVENOUS ONCE
Status: COMPLETED | OUTPATIENT
Start: 2021-09-27 | End: 2021-09-27

## 2021-09-27 RX ORDER — DOCUSATE SODIUM 100 MG/1
100 CAPSULE, LIQUID FILLED ORAL 2 TIMES DAILY PRN
COMMUNITY
End: 2021-10-20

## 2021-09-27 RX ORDER — SODIUM CHLORIDE, SODIUM LACTATE, POTASSIUM CHLORIDE, CALCIUM CHLORIDE 600; 310; 30; 20 MG/100ML; MG/100ML; MG/100ML; MG/100ML
9 INJECTION, SOLUTION INTRAVENOUS CONTINUOUS
Status: DISCONTINUED | OUTPATIENT
Start: 2021-09-27 | End: 2021-09-27 | Stop reason: HOSPADM

## 2021-09-27 RX ORDER — FLUTICASONE PROPIONATE 50 MCG
2 SPRAY, SUSPENSION (ML) NASAL DAILY
COMMUNITY
End: 2023-03-23 | Stop reason: SDUPTHER

## 2021-09-27 RX ADMIN — FAMOTIDINE 20 MG: 10 INJECTION INTRAVENOUS at 11:56

## 2021-09-27 RX ADMIN — EPHEDRINE SULFATE 10 MG: 50 INJECTION, SOLUTION INTRAVENOUS at 12:30

## 2021-09-27 RX ADMIN — SODIUM CHLORIDE, POTASSIUM CHLORIDE, SODIUM LACTATE AND CALCIUM CHLORIDE 9 ML/HR: 600; 310; 30; 20 INJECTION, SOLUTION INTRAVENOUS at 11:55

## 2021-09-27 RX ADMIN — PROPOFOL 300 MG: 10 INJECTION, EMULSION INTRAVENOUS at 12:18

## 2021-09-27 RX ADMIN — ONDANSETRON 4 MG: 2 INJECTION INTRAMUSCULAR; INTRAVENOUS at 11:56

## 2021-09-27 RX ADMIN — DEXMEDETOMIDINE 4 MCG: 100 INJECTION, SOLUTION, CONCENTRATE INTRAVENOUS at 13:01

## 2021-09-27 RX ADMIN — DEXMEDETOMIDINE 4 MCG: 100 INJECTION, SOLUTION, CONCENTRATE INTRAVENOUS at 12:18

## 2021-09-27 RX ADMIN — MIDAZOLAM HYDROCHLORIDE 2 MG: 1 INJECTION, SOLUTION INTRAMUSCULAR; INTRAVENOUS at 12:13

## 2021-09-27 RX ADMIN — EPHEDRINE SULFATE 10 MG: 50 INJECTION, SOLUTION INTRAVENOUS at 12:34

## 2021-09-27 RX ADMIN — GLYCOPYRROLATE 0.2 MG: 0.2 INJECTION INTRAMUSCULAR; INTRAVENOUS at 12:18

## 2021-09-27 RX ADMIN — KETAMINE HYDROCHLORIDE 25 MG: 100 INJECTION INTRAMUSCULAR; INTRAVENOUS at 12:18

## 2021-09-27 RX ADMIN — FENTANYL CITRATE 100 MCG: 50 INJECTION INTRAMUSCULAR; INTRAVENOUS at 12:18

## 2021-09-27 RX ADMIN — KETAMINE HYDROCHLORIDE 25 MG: 100 INJECTION INTRAMUSCULAR; INTRAVENOUS at 12:40

## 2021-09-27 RX ADMIN — DEXAMETHASONE SODIUM PHOSPHATE 8 MG: 4 INJECTION, SOLUTION INTRAMUSCULAR; INTRAVENOUS at 11:56

## 2021-09-27 RX ADMIN — CEFAZOLIN SODIUM 2 G: 2 SOLUTION INTRAVENOUS at 12:26

## 2021-09-27 RX ADMIN — GLYCOPYRROLATE 0.2 MG: 0.2 INJECTION INTRAMUSCULAR; INTRAVENOUS at 12:55

## 2021-09-27 RX ADMIN — DEXMEDETOMIDINE 4 MCG: 100 INJECTION, SOLUTION, CONCENTRATE INTRAVENOUS at 12:42

## 2021-09-27 NOTE — H&P
[facility] History [facility] Physical    Patient Care Team:  Shayna Messina MD as PCP - General (Family Medicine)    Chief complaint left kidney stone    Subjective     Patient is a 56 y.o. male presents with history and may of a left 4 mm distal ureteral stone which has not passed increasing discomfort pain present prompting to the emergency room over the past weekend with a 6 mm left UVJ stone and a 3 mm left renal stone continued frequency pain urgency no fever and chills      Review of Systems   No fever and chills    Past Medical History:   Diagnosis Date   • Allergic    • Arthritis    • Headache    • Hyperlipidemia    • Hypertension    • Sleep apnea     USES CPAP      Past Surgical History:   Procedure Laterality Date   • COLONOSCOPY N/A 11/20/2020    Procedure: COLONOSCOPY INTO CECUM;  Surgeon: Jesse Frey MD;  Location: [facility] ENDOSCOPY;  Service: Gastroenterology;  Laterality: N/A;  PRE: SCREENING  POST: HEMORRHOIDS   • HAND SURGERY  1977   • LUMBAR LAMINECTOMY DISCECTOMY DECOMPRESSION Bilateral 6/28/2021    Procedure: Lumbar laminectomy lumbar 3 lumbar 4 for bilateral decompression;  Surgeon: Bernabe Costello MD;  Location: [facility] MAIN OR;  Service: Neurosurgery;  Laterality: Bilateral;   • WISDOM TOOTH EXTRACTION       Family History   Problem Relation Age of Onset   • Arthritis Mother    • Mental illness Mother    • Stroke Father    • No Known Problems Sister    • No Known Problems Brother    • No Known Problems Brother    • No Known Problems Brother    • Malig Hyperthermia Neg Hx      Social History     Tobacco Use   • Smoking status: Never Smoker   • Smokeless tobacco: Never Used   Vaping Use   • Vaping Use: Never used   Substance Use Topics   • Alcohol use: Yes     Comment: very seldom   • Drug use: Never       Meds:  Medications Prior to Admission   Medication Sig Dispense Refill Last Dose   • acetaminophen (TYLENOL) 500 MG tablet Take 500 mg by mouth Every 6 (Six) Hours  "As Needed for Mild Pain .   Past Week at Unknown time   • docusate sodium (COLACE) 100 MG capsule Take 100 mg by mouth 2 (Two) Times a Day As Needed for Constipation.   9/26/2021 at 1000   • fluticasone (FLONASE) 50 MCG/ACT nasal spray 2 sprays into the nostril(s) as directed by provider Daily.   9/26/2021 at 2100   • losartan-hydrochlorothiazide (Hyzaar) 100-25 MG per tablet Take 1 tablet by mouth Daily. 90 tablet 1 9/26/2021 at 0900   • ondansetron ODT (Zofran ODT) 4 MG disintegrating tablet Place 1 tablet on the tongue Every 6 (Six) Hours As Needed for Nausea or Vomiting for up to 20 doses. 20 tablet 0 Past Week at Unknown time   • oxyCODONE-acetaminophen (PERCOCET) 5-325 MG per tablet Take 1 tablet by mouth Every 6 (Six) Hours As Needed for Moderate Pain  or Severe Pain  for up to 25 doses. 25 tablet 0 9/26/2021 at 2355   • potassium chloride 10 MEQ CR tablet Take 2 tablets by mouth Daily for 7 days. 14 tablet 0 9/26/2021 at 0900   • tamsulosin (FLOMAX) 0.4 MG capsule 24 hr capsule Take 1 capsule by mouth Daily. 30 capsule 1 9/26/2021 at 0900       Allergies:  Patient has no known allergies.    Debilities:  None    Objective     Vital Signs  Temp:  [99.1 °F (37.3 °C)] 99.1 °F (37.3 °C)  Heart Rate:  [68] 68  Resp:  [16] 16  BP: (150)/(91) 150/91  No intake or output data in the 24 hours ending 09/27/21 1203  Flowsheet Rows      First Filed Value   Admission Height  185.4 cm (73\") Documented at 09/27/2021 1108   Admission Weight  127 kg (279 lb 6.4 oz) Documented at 09/27/2021 1108           Physical Exam:      General Appearance:    Alert, cooperative, in mild acute distress   Head:    Normocephalic, without obvious abnormality, atraumatic   Eyes:            Lids and lashes normal, conjunctivae and sclerae normal, no   icterus, no pallor, corneas clear,   Ears:    Ears appear intact with no abnormalities noted   Throat:   No oral lesions, no thrush, oral mucosa moist   Neck:   No adenopathy, supple, trachea " midline, no thyromegaly,  no JVD   Back:     No kyphosis present, no scoliosis present, no skin lesions,      erythema or scars, left CVA tenderness to percussion or                   palpation,   range of motion normal   Lungs:     ,respirations regular, even and                  unlabored    Heart:    Regular rhythm and normal rate   Chest Wall:    No abnormalities observed   Abdomen:   no masses, no organomegaly, soft        non-tender, non-distended, no guarding, no rebound                tenderness                             Results Review:    I reviewed the patient's new clinical results.  CT scan report and films left 6 mm UVJ stone  Results for orders placed or performed during the hospital encounter of 09/27/21   COVID-19,[facility] [employer] IN-HOUSE,Nasal Swab No Transport Media 3-4 HR TAT - Swab, Nasal Cavity    Specimen: Nasal Cavity; Swab   Result Value Ref Range    COVID19 Not Detected Not Detected - Ref. Range        Assessment:  Left 6 mm UVJ stone inability to pass colic frequency and urgency    Plan:    Left ureteroscopy stone manipulation laser fragmentation of stone possible stent placement R-P-O discussed with patient understands agrees to proceed    I discussed the patients findings and my recommendations with patient.     Jethro Valentin MD  09/27/21  12:03 EDT

## 2021-09-27 NOTE — ANESTHESIA PREPROCEDURE EVALUATION
Anesthesia Evaluation     Patient summary reviewed and Nursing notes reviewed   NPO Solid Status: > 8 hours  NPO Liquid Status: > 8 hours           Airway   Mallampati: II  TM distance: >3 FB  Neck ROM: full  No difficulty expected  Dental          Pulmonary - normal exam   (+) sleep apnea on CPAP,   Cardiovascular - normal exam  Exercise tolerance: good (4-7 METS)    ECG reviewed    (+) hypertension 2 medications or greater, hyperlipidemia,     ROS comment: NORMAL ECG -  Sinus rhythm  NO PRIOR TRACING AVAILABLE FOR COMPARISON  Electronically Signed By: Duong Ulrich ([facility]) 19-Jun-2021 12:21:42  Date and Time of Study: 2021-06-18 15:21:56    Neuro/Psych  (+) headaches,     GI/Hepatic/Renal/Endo    (+) morbid obesity,  renal disease stones,     Musculoskeletal     (+) radiculopathy Left upper extremity  Abdominal  - normal exam   Substance History      OB/GYN negative ob/gyn ROS         Other                        Anesthesia Plan    ASA 2     general     intravenous induction     Anesthetic plan, all risks, benefits, and alternatives have been provided, discussed and informed consent has been obtained with: patient.  Use of blood products discussed with patient  Consented to blood products.

## 2021-09-27 NOTE — ANESTHESIA PROCEDURE NOTES
Airway  Date/Time: 9/27/2021 12:19 PM  Airway not difficult    General Information and Staff    Patient location during procedure: OR  CRNA: Vinh Guzman CRNA    Indications and Patient Condition  Indications for airway management: airway protection    Preoxygenated: yes  MILS maintained throughout  Mask difficulty assessment: 0 - not attempted    Final Airway Details  Final airway type: supraglottic airway      Successful airway: LMA  Size 5    Number of attempts at approach: 1  Assessment: lips, teeth, and gum same as pre-op and atraumatic intubation

## 2021-09-27 NOTE — NURSING NOTE
Stone for specimen did not get placed into specimen cup prior to placing back-table cover and tools into trash.  Dr. Valentin notified.  No specimen was sent to lab.

## 2021-09-27 NOTE — INTERVAL H&P NOTE
"H&P reviewed. The patient was examined and there are no changes to the H&P.      /91 (BP Location: Left arm, Patient Position: Lying)   Pulse 68   Temp 99.1 °F (37.3 °C) (Oral)   Resp 16   Ht 185.4 cm (73\")   Wt 127 kg (279 lb 6.4 oz)   SpO2 95%   BMI 36.86 kg/m²   "

## 2021-09-27 NOTE — OP NOTE
Preop diagnosis 6 mm left UVJ stone colic irritative bladder complaints inability to pass    Postop diagnosis same with stone fragmented stent placed    Operative procedure cystoscopy left ureteroscopy rigid and flexible with holmium laser fragmentation of stone placement of a 5 Irish by 30 cm stent with tether under fluoroscopic guidance and retrograde pyelogram    Surgeon Homero Varela General    Procedure note 56-year-old presented himself back in May with a a 4 mm left distal stone never passed representing over the weekend with a 6 mm UVJ stone and 3 mm small renal stone on the left side continued pain and colic and undergo above-mentioned procedure    Site was marked antibiotics were given timeout was taken Covid precautions after adequate anesthesia was placed very carefully modified dorsolithotomy position all pressure points lead prepped draped sterile fashion of his genitalia the urethra meatus was mildly narrowed this was dilated easily intraurethral lytic lidocaine jelly is administered scope advanced bladder remainder of the urethra was normal fossa was normal prostate channel was open bladder neck was mildly high riding no stones were in the bladder some edema from left orifice the bladder was otherwise normal sensor guidewire is placed up on the left side liver glide dilation was done to approximately 10 Irish rigid ureteroscope was advanced the solid stone was partially fragmented and advanced to an area I could could not access with the rigid scope therefore a exchanged for a flexible scope the stone did migrate during the course of fragmentation I presented up into the renal pelvis all the way down the ureter and finally felt the stones to be removed in a retrograde pyelogram showed no filling defects but however there was still delay in emptying on that side so therefore the scope was placed back over the safety now working wire and a 5 Irish by 30 cm stent was then placed with good  curling in the upper collecting system and bladder the bladder is a partially filled the tether was taped his penis is procedure well was sent to the recovery room in satisfactory condition.  Findings were discussed with him called to his wife ASHLEY Connell, RD at [phone]    Disposition stone was sent for chemical analysis I will see him in the office next week my office will call for follow-up appointment with a progressive diet and activities as well as additional medications of Pyridium and Keflex suppression instruction sheet with phone numbers concerns or questions to contact us accordingly

## 2021-09-27 NOTE — BRIEF OP NOTE
CYSTOSCOPY URETEROSCOPY LASER LITHOTRIPSY  Progress Note    Wei E Naomi  9/27/2021    Pre-op Diagnosis:   M54.9  N20.2       Post-Op Diagnosis Codes:  Left 6 mm distal renal stone colic inability to pass fragmented stent placed    Procedure/CPT® Codes:        Procedure(s):  LEFT URETEROSCOPY LASER LITHOTRIPSY POSSIBLE STENT retrograde pyelogram    Surgeon(s):  Jethro Valentin MD    Anesthesia: General    Staff:   Circulator: Jen Goldman RN  Scrub Person: Yudith Fagan  Other: Dipti Phoenix RN         Estimated Blood Loss: none    Urine Voided: * No values recorded between 9/27/2021 12:16 PM and 9/27/2021  1:25 PM *    Specimens:                Specimens     ID Source Type Tests Collected By Collected At Frozen?    1 Kidney, Left Calculus · STONE ANALYSIS   Jethro Valentin MD 9/27/21 1310     This specimen was not marked as sent.                Drains: * No LDAs found *    Findings:     Complications:           Jethro Valentin MD     Date: 9/27/2021  Time: 13:25 EDT

## 2021-09-27 NOTE — ANESTHESIA POSTPROCEDURE EVALUATION
Patient: Wei Salgado    Procedure Summary     Date: 09/27/21 Room / Location: [facility] OR 4 /  [facility] OR    Anesthesia Start: 1216 Anesthesia Stop: 1334    Procedure: LEFT URETEROSCOPY LASER LITHOTRIPSY, STONE BASKET, POSSIBLE STENT (Left Ureter) Diagnosis:       (M54.9)      (N20.2)    Surgeons: Jethro Valentin MD Provider: Vinh Guzman CRNA    Anesthesia Type: general ASA Status: 2          Anesthesia Type: general    Vitals  Vitals Value Taken Time   /84 09/27/21 1410   Temp 98.6 °F (37 °C) 09/27/21 1330   Pulse 92 09/27/21 1410   Resp 14 09/27/21 1410   SpO2 91 % 09/27/21 1411   Vitals shown include unvalidated device data.        Post Anesthesia Care and Evaluation    Patient location during evaluation: bedside  Patient participation: complete - patient participated  Level of consciousness: awake and alert  Pain score: 2  Pain management: adequate  Airway patency: patent  Anesthetic complications: No anesthetic complications  PONV Status: none  Cardiovascular status: acceptable  Respiratory status: acceptable  Hydration status: acceptable

## 2021-09-28 ENCOUNTER — TELEPHONE (OUTPATIENT)
Dept: PHYSICAL THERAPY | Facility: CLINIC | Age: 56
End: 2021-09-28

## 2021-10-01 ENCOUNTER — HOSPITAL ENCOUNTER (OUTPATIENT)
Dept: DIABETES SERVICES | Facility: HOSPITAL | Age: 56
Discharge: HOME | End: 2021-10-01
Admitting: FAMILY MEDICINE

## 2021-10-01 PROCEDURE — 97802 MEDICAL NUTRITION INDIV IN: CPT

## 2021-10-11 NOTE — PROGRESS NOTES
Re-Assessment / Re-Certification for 90 Day Progress Note + Discharge Summary    Patient: Wei Salgado   : 1965  Diagnosis/ICD-10 Code:  Lumbar spine painful on movement [M54.50]  Referring practitioner: Bernabe Costello,*  Date of Initial Visit: Type: THERAPY  Noted: 7/15/2021  Today's Date: 10/12/2021  Patient seen for 15 sessions      Subjective:   Wei Salgado reports: recent surgery due to kidney stone; back to work now. Pt reports back pain 1/10. Pt still occasionally takes Tylenol or asprin as needed due to his busy work schedule.   Subjective Questionnaire:   Back Index: 5/50 or 10%  LEFS: 69/80  Clinical Progress: improved  Home Program Compliance: Yes  Treatment has included: therapeutic exercise, neuromuscular re-education, manual therapy, therapeutic activity, gait training, electrical stimulation, ultrasound, traction, moist heat and cryotherapy    Objective     Active Range of Motion      Lumbar   Flexion: Active lumbar flexion: WNL with stretch in hamstrings    Extension: Active lumbar extension: 75%  Left lateral flexion: Active left lumbar lateral flexion: 50% of WNL   Right lateral flexion: Active right lumbar lateral flexion: 50% of WNL   Left rotation: WFL  Right rotation: WFL     Strength/Myotome Testing      Left Hip   Planes of Motion   Flexion: 5  Extension: 5  Abduction: 5  Adduction: 5  External rotation: 4  Internal rotation: 5     Isolated Muscles   Gluteus van: 4+     Right Hip   Planes of Motion   Flexion: 5  Extension: 5  Abduction: 5  Adduction: 5  External rotation: 4  Internal rotation: 5     Isolated Muscles   Gluteus maximums: 5     Left Knee   Flexion: 4+  Extension: 5     Right Knee   Flexion: 4+  Extension: 5     Left Ankle/Foot   Dorsiflexion: 4+  Plantar flexion: 2+     Right Ankle/Foot   Dorsiflexion: 4+  Plantar flexion: 2+    Additional Strength Details  Upper Abs=3-/5, Lower Abs=4/5  Unable to complete a heel raise through full ROM on either LE  single leg  Needing BUE support to complete B heel raise    Ambulation      Observational Gait     Additional Observational Gait Details  Absent toe off (B) LE     Functional Assessment      Single Leg Stance   Left: 10 seconds  Right: 8 seconds    Assessment & Plan     Assessment  Impairments: abnormal gait, lacks appropriate home exercise program and pain with function  Assessment details: Wei Salgado is a 56 y.o. year-old male referred to physical therapy for s/p L3-4 laminectomy and decompression. 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 Pt reports today he feels better than the past 2 weeks; recent surgery to remove kidney stone which could account for his lingering pain over the past month. Pt with improving lumbar AROM, BLE and core strength along with flexibility; not sig ankle weakness and limited toe off during gait. Pt denies any radiating pain down his legs. Improving gait pattern with decreased antalgia cues to slow pace and focus on quality vs speed. Pt with impaired muscle endurance reporting fatigue especially at work; encouraged to initiate walking program to improve upright endurance and activity tolerance. Pt has met all STGs and progressing towards LTGs. Pt appropriate for discharge to [facility]. Pt educated on importance of posture, core activation, walking program and proper gait mechanics to be able to return to normal activity and work without back pain.   Prognosis: good  Functional Limitations: carrying objects, lifting, walking, pulling, pushing, uncomfortable because of pain and standing  Goals  Plan Goals: STGs to be met by 2 weeks  1.) Pt will be independent and compliant with initial home exercise program.-MET  2.) Pt will report low back pain </= 5/10 to increase ease of standing to prepare a meal.-MET  3.) Pt will be able to stand for one hour without rest  break with low back pain </= 2-3/10. -MET    LTGs to be met by 4 weeks  1.) Pt will be independent and compliant with advanced home exercise program. -MET  2.) Pt will report low back and LE pain </= 3/10 to increase ease of performing work-related duties.-MET  3.) Pt will score >/=57/80 on LEFS indicating decreased perceived functional disability. -MET  4.) Pt will work full work day with minimal to no discomfort in low back or LE.-PROGRESSING       Plan  Treatment plan discussed with: patient  Plan details: Discharge to [facility]        Visit Diagnoses:    ICD-10-CM ICD-9-CM   1. Lumbar spine painful on movement  M54.50 724.2   2. Status post lumbar surgery  Z98.890 V45.89   3. Strain of lumbar region, initial encounter  S39.012A 847.2   4. Difficulty in walking  R26.2 719.7       PT Signature: Danya John PT    Timed:  Manual Therapy:    -     mins  51446;  Therapeutic Exercise:    22     mins  30774;     Neuromuscular Arielle:    9    mins  26399;    Therapeutic Activity:     10     mins  30153;     Gait Training:      -     mins  40195;     Ultrasound:     -     mins  24331;    Electrical Stimulation:    -     mins  15972 ( );    Untimed:  Electrical Stimulation:    -     mins  55192 ( );  Mechanical Traction:    -     mins  85334;     Timed Treatment:   41   mins   Total Treatment:     45   mins

## 2021-10-12 ENCOUNTER — TREATMENT (OUTPATIENT)
Dept: PHYSICAL THERAPY | Facility: CLINIC | Age: 56
End: 2021-10-12

## 2021-10-12 ENCOUNTER — TRANSCRIBE ORDERS (OUTPATIENT)
Dept: ADMINISTRATIVE | Facility: HOSPITAL | Age: 56
End: 2021-10-12

## 2021-10-12 DIAGNOSIS — M54.50 LUMBAR SPINE PAINFUL ON MOVEMENT: Primary | ICD-10-CM

## 2021-10-12 DIAGNOSIS — S39.012A STRAIN OF LUMBAR REGION, INITIAL ENCOUNTER: ICD-10-CM

## 2021-10-12 DIAGNOSIS — Z98.890 STATUS POST LUMBAR SURGERY: ICD-10-CM

## 2021-10-12 DIAGNOSIS — N20.2 CALCULUS OF KIDNEY AND URETER: Primary | ICD-10-CM

## 2021-10-12 DIAGNOSIS — R26.2 DIFFICULTY IN WALKING: ICD-10-CM

## 2021-10-12 PROCEDURE — 97530 THERAPEUTIC ACTIVITIES: CPT | Performed by: PHYSICAL THERAPIST

## 2021-10-12 PROCEDURE — 97110 THERAPEUTIC EXERCISES: CPT | Performed by: PHYSICAL THERAPIST

## 2021-10-12 PROCEDURE — 97112 NEUROMUSCULAR REEDUCATION: CPT | Performed by: PHYSICAL THERAPIST

## 2021-10-17 ENCOUNTER — HOSPITAL ENCOUNTER (EMERGENCY)
Facility: HOSPITAL | Age: 56
Discharge: HOME | End: 2021-10-17
Attending: EMERGENCY MEDICINE | Admitting: EMERGENCY MEDICINE

## 2021-10-17 VITALS
WEIGHT: 280 LBS | TEMPERATURE: 97.6 F | OXYGEN SATURATION: 96 % | SYSTOLIC BLOOD PRESSURE: 166 MMHG | HEIGHT: 73 IN | HEART RATE: 68 BPM | RESPIRATION RATE: 16 BRPM | DIASTOLIC BLOOD PRESSURE: 118 MMHG | BODY MASS INDEX: 37.11 KG/M2

## 2021-10-17 DIAGNOSIS — K08.89 TOOTH ACHE: Primary | ICD-10-CM

## 2021-10-17 PROCEDURE — 99282 EMERGENCY DEPT VISIT SF MDM: CPT

## 2021-10-17 RX ORDER — NAPROXEN 500 MG/1
500 TABLET ORAL 2 TIMES DAILY PRN
Qty: 10 TABLET | Refills: 0 | Status: SHIPPED | OUTPATIENT
Start: 2021-10-17 | End: 2021-10-22

## 2021-10-18 NOTE — ED NOTES
Pt presents with c/o pain to L lower molar since Friday with pain radiating to his L ear. Has been taking left over tramadol and said its not helping. Saw urgent care for same complaint earlier today.     Marizol Desai, HEATHER  10/17/21 6669

## 2021-10-18 NOTE — ED PROVIDER NOTES
EMERGENCY DEPARTMENT ENCOUNTER      Room Number: 09/09    History is provided by the patient, no translation services needed    HPI:    Chief complaint: Tooth pain    Location: Back molar on the left side    Quality/Severity: Patient rates the pain a 4 out of 10 at this time he states that it feels like a dull pain that radiates into his jaw.    Timing/Duration: 3 days    Modifying Factors: None    Associated Symptoms: Jaw pain    Narrative: Pt is a 56 y.o. male who presents complaining of tooth pain.  Patient states that the pain began on Friday.  At this time he rates the pain at 4 out of 10.  He states that sometimes the pain radiates into his jaw and along his cheek.  Patient denies any vision changes, hearing problems, congestion, rhinorrhea, chest pain, abdominal pain, back pain.  He states that the pain is a dull pain.  He states that he has been taking leftover tramadol this weekend for pain control.  Patient states that he saw a provider at urgent care earlier this date and was planning on making an appointment with his dentist for tomorrow. Urgent care provider stated that ENT exam was normal and instructed the pt to take ibuprofen as needed.       PMD: Shayna Messina MD    REVIEW OF SYSTEMS  Review of Systems   Constitutional: Negative for activity change, chills and fever.   HENT: Negative for congestion and rhinorrhea.    Eyes: Negative for pain and visual disturbance.   Respiratory: Negative for cough, chest tightness and shortness of breath.    Cardiovascular: Negative for chest pain and palpitations.   Gastrointestinal: Negative for abdominal pain, nausea and vomiting.   Genitourinary: Negative for difficulty urinating, dysuria and flank pain.   Musculoskeletal: Negative for gait problem and myalgias.   Skin: Negative for color change, pallor, rash and wound.   Neurological: Negative for dizziness, syncope, numbness and headaches.   Psychiatric/Behavioral: Negative for confusion and suicidal  ideas. The patient is not nervous/anxious.          PAST MEDICAL HISTORY  Active Ambulatory Problems     Diagnosis Date Noted   • Adiposity 08/19/2020   • Allergic rhinitis, seasonal 08/19/2020   • Annual physical exam 08/19/2020   • Screen for colon cancer 10/19/2020   • Status post lumbar surgery 07/13/2021   • Ureteral stone with hydronephrosis 09/27/2021     Resolved Ambulatory Problems     Diagnosis Date Noted   • Spinal stenosis, lumbar region, with neurogenic claudication 06/04/2021     Past Medical History:   Diagnosis Date   • Allergic    • Arthritis    • Headache    • Hyperlipidemia    • Hypertension    • Sleep apnea        PAST SURGICAL HISTORY  Past Surgical History:   Procedure Laterality Date   • BACK SURGERY     • COLONOSCOPY N/A 11/20/2020    Procedure: COLONOSCOPY INTO CECUM;  Surgeon: Jesse Frey MD;  Location: [facility] ENDOSCOPY;  Service: Gastroenterology;  Laterality: N/A;  PRE: SCREENING  POST: HEMORRHOIDS   • CYSTOSCOPY URETEROSCOPY LASER LITHOTRIPSY Left 9/27/2021    Procedure: LEFT URETEROSCOPY LASER LITHOTRIPSY, STONE BASKET, POSSIBLE STENT;  Surgeon: Jethro Valentin MD;  Location: [facility];  Service: Urology;  Laterality: Left;   • HAND SURGERY  1977   • LUMBAR LAMINECTOMY DISCECTOMY DECOMPRESSION Bilateral 6/28/2021    Procedure: Lumbar laminectomy lumbar 3 lumbar 4 for bilateral decompression;  Surgeon: Bernabe Costello MD;  Location: [facility];  Service: Neurosurgery;  Laterality: Bilateral;   • WISDOM TOOTH EXTRACTION         FAMILY HISTORY  Family History   Problem Relation Age of Onset   • Arthritis Mother    • Mental illness Mother    • Stroke Father    • No Known Problems Sister    • No Known Problems Brother    • No Known Problems Brother    • No Known Problems Brother    • Malig Hyperthermia Neg Hx        SOCIAL HISTORY  Social History     Socioeconomic History   • Marital status:    Tobacco Use   • Smoking status: Never Smoker   • Smokeless  tobacco: Never Used   Vaping Use   • Vaping Use: Never used   Substance and Sexual Activity   • Alcohol use: Yes     Comment: very seldom   • Drug use: Never   • Sexual activity: Defer       ALLERGIES  Patient has no known allergies.    No current facility-administered medications for this encounter.    Current Outpatient Medications:   •  acetaminophen (TYLENOL) 500 MG tablet, Take 500 mg by mouth Every 6 (Six) Hours As Needed for Mild Pain ., Disp: , Rfl:   •  docusate sodium (COLACE) 100 MG capsule, Take 100 mg by mouth 2 (Two) Times a Day As Needed for Constipation., Disp: , Rfl:   •  fluticasone (FLONASE) 50 MCG/ACT nasal spray, 2 sprays into the nostril(s) as directed by provider Daily., Disp: , Rfl:   •  losartan-hydrochlorothiazide (Hyzaar) 100-25 MG per tablet, Take 1 tablet by mouth Daily., Disp: 90 tablet, Rfl: 1  •  naproxen (EC NAPROSYN) 500 MG EC tablet, Take 1 tablet by mouth 2 (Two) Times a Day As Needed for Mild Pain  for up to 5 days., Disp: 10 tablet, Rfl: 0    PHYSICAL EXAM  ED Triage Vitals [10/17/21 2136]   Temp Heart Rate Resp BP SpO2   97.6 °F (36.4 °C) 68 16 (!) 166/118 96 %      Temp src Heart Rate Source Patient Position BP Location FiO2 (%)   Oral -- -- -- --       Physical Exam  Vitals and nursing note reviewed.   Constitutional:       General: He is not in acute distress.     Appearance: Normal appearance. He is not ill-appearing, toxic-appearing or diaphoretic.   HENT:      Head: Normocephalic and atraumatic.      Right Ear: Hearing, tympanic membrane, ear canal and external ear normal.      Left Ear: Hearing, tympanic membrane, ear canal and external ear normal.      Mouth/Throat:      Mouth: Mucous membranes are moist. No injury, lacerations, oral lesions or angioedema.      Pharynx: Oropharynx is clear. No oropharyngeal exudate or posterior oropharyngeal erythema.      Comments: Pt appears to have a history of poor oral health. Many teeth have fillings or are missing. No obviously  acute injury, dental caries, or abscess.   Eyes:      Conjunctiva/sclera: Conjunctivae normal.   Cardiovascular:      Rate and Rhythm: Normal rate and regular rhythm.      Heart sounds: Normal heart sounds.   Pulmonary:      Effort: Pulmonary effort is normal. No respiratory distress.      Breath sounds: Normal breath sounds.   Abdominal:      General: Bowel sounds are normal. There is no distension.      Palpations: Abdomen is soft.      Tenderness: There is no abdominal tenderness.   Musculoskeletal:         General: Normal range of motion.      Cervical back: Normal range of motion and neck supple.   Skin:     General: Skin is warm and dry.   Neurological:      Mental Status: He is alert and oriented to person, place, and time.   Psychiatric:         Mood and Affect: Mood and affect normal.           LAB RESULTS  Lab Results (last 24 hours)     ** No results found for the last 24 hours. **            RADIOLOGY  No Radiology Exams Resulted Within Past 24 Hours        PROCEDURES  Procedures      PROGRESS AND CONSULTS           MEDICAL DECISION MAKING    MDM     My differential diagnosis for toothache includes but is not limited to dental caries, dental abscess, gingivitis, peritonitis, tooth fractures, mandibular fractures, TMJ syndrome, herpetic gingivostomatitis and necrotizing gingivitis  DIAGNOSIS  Final diagnoses:   Tooth ache       Latest Documented Vital Signs:  As of 22:31 EDT  BP- (!) 166/118 HR- 68 Temp- 97.6 °F (36.4 °C) (Oral) O2 sat- 96%    DISPOSITION  Patient discharged      Discussed pertinent findings with the patient.  Patient voiced understanding of need to follow-up for recheck and further testing as needed.  Return to the Emergency Department warnings were given.         Medication List      New Prescriptions    naproxen 500 MG EC tablet  Commonly known as: EC NAPROSYN  Take 1 tablet by mouth 2 (Two) Times a Day As Needed for Mild Pain  for up to 5 days.           Where to Get Your Medications       These medications were sent to [facility] [address] [place] [address] [address] - [phone]  - [phone] FX  [address], [facility] [address]    Phone: [phone]   · naproxen 500 MG EC tablet              Follow-up Information     Shayna Messina MD. Call in 1 day.    Specialty: Family Medicine  Why: to schedule follow up  Contact information:  [address] [id number] [phone]             [facility]. Call in 1 day.    Why: to schedule follow up  Contact information:  [address] [facility] [address]  [phone]                         Dictated utilizing [employer] dictation     Alessia López PA-C  10/17/21 2237

## 2021-10-19 NOTE — PROGRESS NOTES
Chief Complaint   Patient presents with   • Hypertension       Subjective   Wei Salgado is a 56 y.o. male.     History of Present Illness     56-year-old male with elevated blood pressure and hyperlipidemia, SIVAN, Chronic back pain, obesity    Hyperlipidemia: Has declined statin.       Body mass index is 37      The 10-year ASCVD risk score (Tiny MOSHER Jr., et al., 2013) is: 13.5%    Values used to calculate the score:      Age: 56 years      Sex: Male      Is Non- : No      Diabetic: No      Tobacco smoker: No      Systolic Blood Pressure: 150 mmHg      Is BP treated: Yes      HDL Cholesterol: 43 mg/dL      Total Cholesterol: 251 mg/dL      Hypertension: Has been slowly increasing.  On losartan HCTZ 100/25, asymptomatic.  Blood pressure is lower at home per the patient.  He is getting closer to normal readings.    Hearing loss: Audiology referral    SIVAN: Needs new CPAP strap    The following portions of the patient's history were reviewed and updated as appropriate: allergies, current medications, past family history, past medical history, past social history, past surgical history and problem list.      Past Medical History:   Diagnosis Date   • Allergic    • Arthritis    • Headache    • Hyperlipidemia    • Hypertension    • Sleep apnea     USES CPAP        Past Surgical History:   Procedure Laterality Date   • BACK SURGERY     • COLONOSCOPY N/A 11/20/2020    Procedure: COLONOSCOPY INTO CECUM;  Surgeon: Jesse Frey MD;  Location: [facility] ENDOSCOPY;  Service: Gastroenterology;  Laterality: N/A;  PRE: SCREENING  POST: HEMORRHOIDS   • CYSTOSCOPY URETEROSCOPY LASER LITHOTRIPSY Left 9/27/2021    Procedure: LEFT URETEROSCOPY LASER LITHOTRIPSY, STONE BASKET, POSSIBLE STENT;  Surgeon: Jethro Valentin MD;  Location: [facility] OR;  Service: Urology;  Laterality: Left;   • HAND SURGERY  1977   • LUMBAR LAMINECTOMY DISCECTOMY DECOMPRESSION Bilateral 6/28/2021    Procedure: Lumbar laminectomy  lumbar 3 lumbar 4 for bilateral decompression;  Surgeon: Bernabe Costello MD;  Location: [facility] MAIN OR;  Service: Neurosurgery;  Laterality: Bilateral;   • WISDOM TOOTH EXTRACTION         Family History   Problem Relation Age of Onset   • Arthritis Mother    • Mental illness Mother    • Stroke Father    • No Known Problems Sister    • No Known Problems Brother    • No Known Problems Brother    • No Known Problems Brother    • Malig Hyperthermia Neg Hx        Social History     Socioeconomic History   • Marital status:    Tobacco Use   • Smoking status: Never Smoker   • Smokeless tobacco: Never Used   Vaping Use   • Vaping Use: Never used   Substance and Sexual Activity   • Alcohol use: Yes     Comment: very seldom   • Drug use: Never   • Sexual activity: Defer       Current Outpatient Medications on File Prior to Visit   Medication Sig Dispense Refill   • acetaminophen (TYLENOL) 500 MG tablet Take 500 mg by mouth Every 6 (Six) Hours As Needed for Mild Pain .     • amoxicillin (AMOXIL) 500 MG capsule Take 500 mg by mouth 3 (Three) Times a Day.     • fluticasone (FLONASE) 50 MCG/ACT nasal spray 2 sprays into the nostril(s) as directed by provider Daily.     • losartan-hydrochlorothiazide (Hyzaar) 100-25 MG per tablet Take 1 tablet by mouth Daily. 90 tablet 1   • naproxen (EC NAPROSYN) 500 MG EC tablet Take 1 tablet by mouth 2 (Two) Times a Day As Needed for Mild Pain  for up to 5 days. 10 tablet 0   • [DISCONTINUED] docusate sodium (COLACE) 100 MG capsule Take 100 mg by mouth 2 (Two) Times a Day As Needed for Constipation.       No current facility-administered medications on file prior to visit.       Review of Systems   Constitutional: Negative for activity change, chills and fever.   HENT: Negative for congestion, postnasal drip and rhinorrhea.    Eyes: Negative for blurred vision and pain.   Respiratory: Negative for cough, chest tightness and shortness of breath.    Cardiovascular: Negative for  chest pain.   Gastrointestinal: Negative for abdominal pain, constipation, diarrhea, nausea and vomiting.   Endocrine: Negative for cold intolerance and heat intolerance.   Genitourinary: Negative for decreased urine volume, dysuria and frequency.   Musculoskeletal: Negative for arthralgias, back pain and myalgias.   Skin: Negative for rash and skin lesions.   Neurological: Negative for dizziness and confusion.   Psychiatric/Behavioral: Negative for agitation, behavioral problems and depressed mood.           Vitals:    10/20/21 0826   BP: 150/98   Pulse: 81   Temp: 98 °F (36.7 °C)   SpO2: 98%      Objective   Physical Exam  Vitals and nursing note reviewed.   Constitutional:       Appearance: He is well-developed.   Eyes:      Pupils: Pupils are equal, round, and reactive to light.   Cardiovascular:      Rate and Rhythm: Normal rate and regular rhythm.      Heart sounds: Normal heart sounds. No murmur heard.      Pulmonary:      Effort: Pulmonary effort is normal. No respiratory distress.      Breath sounds: Normal breath sounds.   Abdominal:      General: Bowel sounds are normal.      Palpations: Abdomen is soft.   Musculoskeletal:         General: Normal range of motion.      Cervical back: Neck supple.   Skin:     General: Skin is warm and dry.   Neurological:      Mental Status: He is alert and oriented to person, place, and time.           Assessment/Plan   Problems Addressed this Visit     None      Visit Diagnoses     Primary hypertension    -  Primary    Mixed hyperlipidemia        Other specified hearing loss of both ears        SIVAN (obstructive sleep apnea)          Diagnoses       Codes Comments    Primary hypertension    -  Primary ICD-10-CM: I10  ICD-9-CM: 401.9     Mixed hyperlipidemia     ICD-10-CM: E78.2  ICD-9-CM: 272.2     Other specified hearing loss of both ears     ICD-10-CM: H91.8X3  ICD-9-CM: 389.8     SIVAN (obstructive sleep apnea)     ICD-10-CM: G47.33  ICD-9-CM: 327.23            Hypertension: We will have him bring in his home blood pressure cuff as he is getting normal readings at home.  For a medical assistant blood pressure check in 2 weeks.  We will calibrate his cuff.    If we continue to get elevated readings in the 150s we will start amlodipine 10 mg in addition to his combination pill    Hyperlipidemia: Recheck at his physical in the spring time, has declined statin     Referral to audiology for hearing loss.  No issues with cerumen or prior ear infections etc.    SIVAN: Needs new CPAP strap.    Return in about 6 months (around 4/20/2022) for Annual with labs prior.      Shayna Messina MD

## 2021-10-20 ENCOUNTER — OFFICE VISIT (OUTPATIENT)
Dept: FAMILY MEDICINE CLINIC | Facility: CLINIC | Age: 56
End: 2021-10-20

## 2021-10-20 VITALS
TEMPERATURE: 98 F | HEART RATE: 81 BPM | HEIGHT: 73 IN | DIASTOLIC BLOOD PRESSURE: 98 MMHG | WEIGHT: 282 LBS | BODY MASS INDEX: 37.37 KG/M2 | SYSTOLIC BLOOD PRESSURE: 150 MMHG | OXYGEN SATURATION: 98 %

## 2021-10-20 DIAGNOSIS — H91.8X3 OTHER SPECIFIED HEARING LOSS OF BOTH EARS: ICD-10-CM

## 2021-10-20 DIAGNOSIS — G47.33 OSA (OBSTRUCTIVE SLEEP APNEA): ICD-10-CM

## 2021-10-20 DIAGNOSIS — E78.2 MIXED HYPERLIPIDEMIA: ICD-10-CM

## 2021-10-20 DIAGNOSIS — I10 PRIMARY HYPERTENSION: Primary | ICD-10-CM

## 2021-10-20 PROCEDURE — 99214 OFFICE O/P EST MOD 30 MIN: CPT | Performed by: FAMILY MEDICINE

## 2021-10-20 RX ORDER — AMOXICILLIN 500 MG/1
500 CAPSULE ORAL 3 TIMES DAILY
COMMUNITY
End: 2021-11-26

## 2021-11-02 ENCOUNTER — HOSPITAL ENCOUNTER (OUTPATIENT)
Dept: CT IMAGING | Facility: HOSPITAL | Age: 56
End: 2021-11-02

## 2021-11-10 ENCOUNTER — TELEPHONE (OUTPATIENT)
Dept: FAMILY MEDICINE CLINIC | Facility: CLINIC | Age: 56
End: 2021-11-10

## 2021-11-26 ENCOUNTER — HOSPITAL ENCOUNTER (EMERGENCY)
Facility: HOSPITAL | Age: 56
Discharge: HOME | End: 2021-11-26
Attending: EMERGENCY MEDICINE | Admitting: EMERGENCY MEDICINE

## 2021-11-26 ENCOUNTER — APPOINTMENT (OUTPATIENT)
Dept: GENERAL RADIOLOGY | Facility: HOSPITAL | Age: 56
End: 2021-11-26

## 2021-11-26 VITALS
BODY MASS INDEX: 37.24 KG/M2 | RESPIRATION RATE: 16 BRPM | HEART RATE: 71 BPM | HEIGHT: 73 IN | SYSTOLIC BLOOD PRESSURE: 132 MMHG | WEIGHT: 281 LBS | DIASTOLIC BLOOD PRESSURE: 74 MMHG | OXYGEN SATURATION: 98 % | TEMPERATURE: 98.8 F

## 2021-11-26 DIAGNOSIS — R05.9 COUGH: ICD-10-CM

## 2021-11-26 DIAGNOSIS — J18.9 PNEUMONIA OF LEFT LOWER LOBE DUE TO INFECTIOUS ORGANISM: Primary | ICD-10-CM

## 2021-11-26 PROCEDURE — 99282 EMERGENCY DEPT VISIT SF MDM: CPT | Performed by: NURSE PRACTITIONER

## 2021-11-26 PROCEDURE — 99282 EMERGENCY DEPT VISIT SF MDM: CPT

## 2021-11-26 PROCEDURE — 71045 X-RAY EXAM CHEST 1 VIEW: CPT

## 2021-11-26 RX ORDER — DOXYCYCLINE 100 MG/1
100 CAPSULE ORAL 2 TIMES DAILY
Qty: 14 CAPSULE | Refills: 0 | Status: SHIPPED | OUTPATIENT
Start: 2021-11-26 | End: 2021-12-03

## 2021-11-26 RX ORDER — BENZONATATE 200 MG/1
200 CAPSULE ORAL 3 TIMES DAILY PRN
Qty: 15 CAPSULE | Refills: 0 | Status: SHIPPED | OUTPATIENT
Start: 2021-11-26 | End: 2021-12-03 | Stop reason: SDUPTHER

## 2021-11-29 ENCOUNTER — HOSPITAL ENCOUNTER (OUTPATIENT)
Dept: CT IMAGING | Facility: HOSPITAL | Age: 56
Discharge: HOME | End: 2021-11-29
Admitting: UROLOGY

## 2021-11-29 DIAGNOSIS — N20.2 CALCULUS OF KIDNEY AND URETER: ICD-10-CM

## 2021-11-29 PROCEDURE — 74176 CT ABD & PELVIS W/O CONTRAST: CPT

## 2021-12-03 ENCOUNTER — OFFICE VISIT (OUTPATIENT)
Dept: FAMILY MEDICINE CLINIC | Facility: CLINIC | Age: 56
End: 2021-12-03

## 2021-12-03 VITALS
WEIGHT: 276 LBS | BODY MASS INDEX: 36.58 KG/M2 | DIASTOLIC BLOOD PRESSURE: 88 MMHG | TEMPERATURE: 97.1 F | SYSTOLIC BLOOD PRESSURE: 130 MMHG | HEIGHT: 73 IN | HEART RATE: 81 BPM | OXYGEN SATURATION: 98 %

## 2021-12-03 DIAGNOSIS — J18.9 PNEUMONIA OF LEFT LOWER LOBE DUE TO INFECTIOUS ORGANISM: Primary | ICD-10-CM

## 2021-12-03 DIAGNOSIS — J06.9 UPPER RESPIRATORY TRACT INFECTION, UNSPECIFIED TYPE: ICD-10-CM

## 2021-12-03 DIAGNOSIS — R05.9 COUGH: ICD-10-CM

## 2021-12-03 PROCEDURE — 99213 OFFICE O/P EST LOW 20 MIN: CPT | Performed by: FAMILY MEDICINE

## 2021-12-03 RX ORDER — PREDNISONE 20 MG/1
TABLET ORAL
COMMUNITY
Start: 2021-11-30 | End: 2021-12-21

## 2021-12-03 RX ORDER — BENZONATATE 200 MG/1
200 CAPSULE ORAL 3 TIMES DAILY PRN
Qty: 30 CAPSULE | Refills: 2 | Status: SHIPPED | OUTPATIENT
Start: 2021-12-03 | End: 2021-12-21

## 2021-12-03 RX ORDER — DOXYCYCLINE HYCLATE 100 MG/1
100 CAPSULE ORAL 2 TIMES DAILY
Qty: 10 CAPSULE | Refills: 0 | Status: SHIPPED | OUTPATIENT
Start: 2021-12-03 | End: 2021-12-08

## 2021-12-03 RX ORDER — GUAIFENESIN AND CODEINE PHOSPHATE 100; 10 MG/5ML; MG/5ML
5 SOLUTION ORAL 3 TIMES DAILY PRN
Qty: 236 ML | Refills: 0 | Status: SHIPPED | OUTPATIENT
Start: 2021-12-03 | End: 2021-12-21

## 2021-12-03 NOTE — PROGRESS NOTES
Chief Complaint   Patient presents with   • Hypertension   • Hospital Follow Up Visit       Subjective   Wei Salgado is a 56 y.o. male.     History of Present Illness   56-year-old male here for follow-up regarding pneumonia    He tested positive for Covid and got Regeneron infusion.  He was starting to get better but then had a nagging cough.  Proceeded to the emergency room on 11/26.  Opacity left lower lung on x-ray and symptoms of pneumonia.  Was started on doxycycline.  Followed up with Dr. Manriquez he was already started on prednisone.    Several days left of prednisone and doxycycline and he is improving.  He still has a cough.  Postnasal drip.  Sudafed helps.    No fevers.  Energy levels are good.  No shortness of breath.  Vitals and blood pressure have been stable.      The following portions of the patient's history were reviewed and updated as appropriate: allergies, current medications, past family history, past medical history, past social history, past surgical history and problem list.      Past Medical History:   Diagnosis Date   • Allergic    • Arthritis    • Headache    • Hyperlipidemia    • Hypertension    • Sleep apnea     USES CPAP        Past Surgical History:   Procedure Laterality Date   • BACK SURGERY     • COLONOSCOPY N/A 11/20/2020    Procedure: COLONOSCOPY INTO CECUM;  Surgeon: Jesse Frey MD;  Location: [facility] ENDOSCOPY;  Service: Gastroenterology;  Laterality: N/A;  PRE: SCREENING  POST: HEMORRHOIDS   • CYSTOSCOPY URETEROSCOPY LASER LITHOTRIPSY Left 9/27/2021    Procedure: LEFT URETEROSCOPY LASER LITHOTRIPSY, STONE BASKET, POSSIBLE STENT;  Surgeon: Jethro Valentin MD;  Location: [facility] OR;  Service: Urology;  Laterality: Left;   • HAND SURGERY  1977   • LUMBAR LAMINECTOMY DISCECTOMY DECOMPRESSION Bilateral 6/28/2021    Procedure: Lumbar laminectomy lumbar 3 lumbar 4 for bilateral decompression;  Surgeon: Bernabe Costello MD;  Location: [facility] OR;  Service:  Neurosurgery;  Laterality: Bilateral;   • WISDOM TOOTH EXTRACTION         Family History   Problem Relation Age of Onset   • Arthritis Mother    • Mental illness Mother    • Stroke Father    • No Known Problems Sister    • No Known Problems Brother    • No Known Problems Brother    • No Known Problems Brother    • Malig Hyperthermia Neg Hx        Social History     Socioeconomic History   • Marital status:    Tobacco Use   • Smoking status: Never Smoker   • Smokeless tobacco: Never Used   Vaping Use   • Vaping Use: Never used   Substance and Sexual Activity   • Alcohol use: Yes     Comment: very seldom   • Drug use: Never   • Sexual activity: Defer       Current Outpatient Medications on File Prior to Visit   Medication Sig Dispense Refill   • acetaminophen (TYLENOL) 500 MG tablet Take 500 mg by mouth Every 6 (Six) Hours As Needed for Mild Pain .     • doxycycline (MONODOX) 100 MG capsule Take 1 capsule by mouth 2 (Two) Times a Day for 7 days. 14 capsule 0   • fluticasone (FLONASE) 50 MCG/ACT nasal spray 2 sprays into the nostril(s) as directed by provider Daily.     • losartan-hydrochlorothiazide (Hyzaar) 100-25 MG per tablet Take 1 tablet by mouth Daily. 90 tablet 1   • predniSONE (DELTASONE) 20 MG tablet      • [DISCONTINUED] benzonatate (TESSALON) 200 MG capsule Take 1 capsule by mouth 3 (Three) Times a Day As Needed for Cough. 15 capsule 0     No current facility-administered medications on file prior to visit.       Review of Systems   Constitutional: Negative for fever.   HENT: Positive for congestion.    Respiratory: Positive for cough. Negative for shortness of breath and wheezing.    Cardiovascular: Negative for chest pain.   Gastrointestinal: Negative for abdominal pain.   Genitourinary: Negative for decreased urine volume.   Musculoskeletal: Negative for gait problem.   Neurological: Negative for weakness.   Psychiatric/Behavioral: Negative for depressed mood.           Vitals:    12/03/21 1351    BP: 130/88   Pulse: 81   Temp: 97.1 °F (36.2 °C)   SpO2: 98%      Objective   Physical Exam  Vitals and nursing note reviewed.   Constitutional:       Appearance: He is well-developed.   Cardiovascular:      Rate and Rhythm: Normal rate and regular rhythm.      Heart sounds: Normal heart sounds. No murmur heard.      Pulmonary:      Effort: Pulmonary effort is normal. No respiratory distress.      Breath sounds: Normal breath sounds.   Abdominal:      Palpations: Abdomen is soft.   Musculoskeletal:      Cervical back: Neck supple.   Skin:     General: Skin is warm.   Neurological:      Mental Status: He is alert and oriented to person, place, and time.           Assessment/Plan   Problems Addressed this Visit     None      Visit Diagnoses     Pneumonia of left lower lobe due to infectious organism    -  Primary    Relevant Medications    benzonatate (TESSALON) 200 MG capsule    doxycycline (VIBRAMYCIN) 100 MG capsule    guaiFENesin-codeine (GUAIFENESIN AC) 100-10 MG/5ML liquid    Cough        Relevant Medications    benzonatate (TESSALON) 200 MG capsule    doxycycline (VIBRAMYCIN) 100 MG capsule    guaiFENesin-codeine (GUAIFENESIN AC) 100-10 MG/5ML liquid    Upper respiratory tract infection, unspecified type        Relevant Medications    doxycycline (VIBRAMYCIN) 100 MG capsule    guaiFENesin-codeine (GUAIFENESIN AC) 100-10 MG/5ML liquid      Diagnoses       Codes Comments    Pneumonia of left lower lobe due to infectious organism    -  Primary ICD-10-CM: J18.9  ICD-9-CM: 486     Cough     ICD-10-CM: R05.9  ICD-9-CM: 786.2     Upper respiratory tract infection, unspecified type     ICD-10-CM: J06.9  ICD-9-CM: 465.9           Finish out doxycycline and prednisone as needed cough syrup  Notify us if he does not continue to improve but otherwise follow-up 1 week         Shayna Messina MD

## 2021-12-15 NOTE — PROGRESS NOTES
Subjective   History of Present Illness: Wei Salgado is a 56 y.o. male is here today for follow-up. Mr. Salgado was last seen in the office on 08-10-21. He had a L3-4 laminectomy for bilateral decompression on 06-28-21. He contacted the office on 12-12-21 with complaints of left leg weakness.    Today, Mr. Salgado reports lower back pain that radiates into the left leg with weakness. He denies numbness, tingling and loss of bowel/bladder control. He reports occasional right leg pain. This began spontaneously over the last month but became quite severe last weekend with cramping in the left leg and a weak sensation especially with climbing stairs.    While in the room and during my examination of the patient I wore a mask and eye protection.  I washed my hands before and after this patient encounter.  The patient was also wearing a mask.    Back Pain  The problem occurs constantly. The problem has been gradually worsening since onset. The quality of the pain is described as cramping. The pain radiates to the left knee, left thigh, left foot, right thigh, right knee and right foot. The symptoms are aggravated by position. Associated symptoms include weakness. Pertinent negatives include no bladder incontinence, bowel incontinence, chest pain, numbness or tingling.       The following portions of the patient's history were reviewed and updated as appropriate: allergies, current medications, past family history, past medical history, past social history, past surgical history and problem list.    Review of Systems   Respiratory: Negative for chest tightness and shortness of breath.    Cardiovascular: Negative for chest pain.   Gastrointestinal: Negative for bowel incontinence.   Genitourinary: Negative for bladder incontinence.   Musculoskeletal: Positive for back pain.   Neurological: Positive for weakness. Negative for tingling and numbness.       Objective     Vitals:    12/21/21 0835   BP: 130/82   Pulse: 77   Temp:  "98 °F (36.7 °C)   SpO2: 99%   Weight: 125 kg (276 lb)   Height: 185.4 cm (72.99\")     Body mass index is 36.42 kg/m².      Physical Exam  Neurologic Exam    Physical Exam:    CONSTITUTIONAL:  appears well developed, well-nourished and in no acute distress.    NECK: the neck is supple and symmetric. The trachea is midline with no masses.      PULMONARY: Respiratory effort is normal with no increased work of breathing or signs of respiratory distress.    CARDIOVASCULAR: Pedal pulses are +2/4 bilaterally. Examination of the extremities shows no edema or varicosities.    MUSCULOSKELETAL: Gait does guard the left leg with a bit of a limp, Homans' sign is negative bilaterally    SKIN: The skin is warm, dry and intact. Lumbar incision healed no signs of complication    NEUROLOGIC:   Normal motor strength noted. Muscle bulk and tone are normal.  Sensory exam is normal to all modalities.  Straight leg raising test and Nick signs negative today  Cortical function is intact and without deficits. Speech is normal.    PSYCHIATRIC: oriented to person, place and time. Patient's mood and affect are normal.    Assessment/Plan   Independent Review of Radiographic Studies:     No new lumbar studies    Medical Decision Making:      Given his presentation and having had surgery 6 months ago I am compelled to update the work-up on his lumbar spine to make sure there has been no structural issue. Might be inflammatory in that case I given him a Medrol Dosepak to see if that helps resolve the symptoms at least temporarily. I will see him back upon completion of flexion-extension lumbar films and an MRI of the lumbar spine with and without contrast.    Return for review of completed images.    Diagnoses and all orders for this visit:    1. Status post lumbar surgery (Primary)  -     MRI Lumbar Spine With & Without Contrast; Future  -     XR Spine Lumbar Complete With Flex & Ext; Future    2. Left leg weakness  -     MRI Lumbar Spine With " & Without Contrast; Future  -     XR Spine Lumbar Complete With Flex & Ext; Future    3. Sciatica of left side  -     MRI Lumbar Spine With & Without Contrast; Future  -     XR Spine Lumbar Complete With Flex & Ext; Future    Other orders  -     methylPREDNISolone (MEDROL) 4 MG dose pack; Take as directed on package instructions.  Dispense: 1 each; Refill: 0             Bernabe Costello MD FACS FAANS  Neurological Surgery

## 2021-12-21 ENCOUNTER — OFFICE VISIT (OUTPATIENT)
Dept: NEUROSURGERY | Facility: CLINIC | Age: 56
End: 2021-12-21

## 2021-12-21 VITALS
HEART RATE: 77 BPM | WEIGHT: 276 LBS | HEIGHT: 73 IN | SYSTOLIC BLOOD PRESSURE: 130 MMHG | DIASTOLIC BLOOD PRESSURE: 82 MMHG | TEMPERATURE: 98 F | OXYGEN SATURATION: 99 % | BODY MASS INDEX: 36.58 KG/M2

## 2021-12-21 DIAGNOSIS — R29.898 LEFT LEG WEAKNESS: ICD-10-CM

## 2021-12-21 DIAGNOSIS — Z98.890 STATUS POST LUMBAR SURGERY: Primary | ICD-10-CM

## 2021-12-21 DIAGNOSIS — M54.32 SCIATICA OF LEFT SIDE: ICD-10-CM

## 2021-12-21 PROCEDURE — 99214 OFFICE O/P EST MOD 30 MIN: CPT | Performed by: NEUROLOGICAL SURGERY

## 2021-12-21 RX ORDER — METHYLPREDNISOLONE 4 MG/1
TABLET ORAL
Qty: 1 EACH | Refills: 0 | Status: SHIPPED | OUTPATIENT
Start: 2021-12-21 | End: 2022-02-01

## 2021-12-22 ENCOUNTER — OFFICE VISIT (OUTPATIENT)
Dept: FAMILY MEDICINE CLINIC | Facility: CLINIC | Age: 56
End: 2021-12-22

## 2021-12-22 VITALS
TEMPERATURE: 98 F | DIASTOLIC BLOOD PRESSURE: 72 MMHG | HEIGHT: 73 IN | HEART RATE: 102 BPM | SYSTOLIC BLOOD PRESSURE: 120 MMHG | BODY MASS INDEX: 36.71 KG/M2 | WEIGHT: 277 LBS | OXYGEN SATURATION: 98 %

## 2021-12-22 DIAGNOSIS — J12.82 PNEUMONIA DUE TO COVID-19 VIRUS: Primary | ICD-10-CM

## 2021-12-22 DIAGNOSIS — I10 ESSENTIAL HYPERTENSION: ICD-10-CM

## 2021-12-22 DIAGNOSIS — U07.1 PNEUMONIA DUE TO COVID-19 VIRUS: Primary | ICD-10-CM

## 2021-12-22 PROCEDURE — 99213 OFFICE O/P EST LOW 20 MIN: CPT | Performed by: FAMILY MEDICINE

## 2021-12-22 RX ORDER — LOSARTAN POTASSIUM AND HYDROCHLOROTHIAZIDE 25; 100 MG/1; MG/1
1 TABLET ORAL DAILY
Qty: 90 TABLET | Refills: 1 | Status: SHIPPED | OUTPATIENT
Start: 2021-12-22 | End: 2022-06-17 | Stop reason: SDUPTHER

## 2021-12-22 NOTE — PROGRESS NOTES
Chief Complaint   Patient presents with   • Cough     follow-up       Subjective   Wei Salgado is a 56 y.o. male.     History of Present Illness   Here regarding follow-up for his pneumonia    Symptoms significantly improved.  No longer having a cough.  No dyspnea on exertion.    Hypertension: Has been stable on losartan hydrochlorothiazide    The 10-year ASCVD risk score (Tiny MOSHER Jr., et al., 2013) is: 9.3%    Values used to calculate the score:      Age: 56 years      Sex: Male      Is Non- : No      Diabetic: No      Tobacco smoker: No      Systolic Blood Pressure: 120 mmHg      Is BP treated: Yes      HDL Cholesterol: 43 mg/dL      Total Cholesterol: 251 mg/dL      Would be in the range to consider a statin medication he will be due for his annual physical    The following portions of the patient's history were reviewed and updated as appropriate: allergies, current medications, past family history, past medical history, past social history, past surgical history and problem list.      Past Medical History:   Diagnosis Date   • Allergic    • Arthritis    • Headache    • Hyperlipidemia    • Hypertension    • Sleep apnea     USES CPAP        Past Surgical History:   Procedure Laterality Date   • BACK SURGERY     • COLONOSCOPY N/A 11/20/2020    Procedure: COLONOSCOPY INTO CECUM;  Surgeon: Jesse Frey MD;  Location: [facility] ENDOSCOPY;  Service: Gastroenterology;  Laterality: N/A;  PRE: SCREENING  POST: HEMORRHOIDS   • CYSTOSCOPY URETEROSCOPY LASER LITHOTRIPSY Left 9/27/2021    Procedure: LEFT URETEROSCOPY LASER LITHOTRIPSY, STONE BASKET, POSSIBLE STENT;  Surgeon: Jethro Valentin MD;  Location: [facility] OR;  Service: Urology;  Laterality: Left;   • HAND SURGERY  1977   • LUMBAR LAMINECTOMY DISCECTOMY DECOMPRESSION Bilateral 6/28/2021    Procedure: Lumbar laminectomy lumbar 3 lumbar 4 for bilateral decompression;  Surgeon: Bernabe Costello MD;  Location: [facility] OR;   Service: Neurosurgery;  Laterality: Bilateral;   • WISDOM TOOTH EXTRACTION         Family History   Problem Relation Age of Onset   • Arthritis Mother    • Mental illness Mother    • Stroke Father    • No Known Problems Sister    • No Known Problems Brother    • No Known Problems Brother    • No Known Problems Brother    • Malig Hyperthermia Neg Hx        Social History     Socioeconomic History   • Marital status:    Tobacco Use   • Smoking status: Never Smoker   • Smokeless tobacco: Never Used   Vaping Use   • Vaping Use: Never used   Substance and Sexual Activity   • Alcohol use: Yes     Comment: very seldom   • Drug use: Never   • Sexual activity: Defer       Current Outpatient Medications on File Prior to Visit   Medication Sig Dispense Refill   • acetaminophen (TYLENOL) 500 MG tablet Take 500 mg by mouth Every 6 (Six) Hours As Needed for Mild Pain .     • fluticasone (FLONASE) 50 MCG/ACT nasal spray 2 sprays into the nostril(s) as directed by provider Daily.     • methylPREDNISolone (MEDROL) 4 MG dose pack Take as directed on package instructions. 1 each 0   • [DISCONTINUED] losartan-hydrochlorothiazide (Hyzaar) 100-25 MG per tablet Take 1 tablet by mouth Daily. 90 tablet 1     No current facility-administered medications on file prior to visit.       Review of Systems   Constitutional: Negative for fever.   HENT: Negative for congestion.    Respiratory: Negative for shortness of breath.    Cardiovascular: Negative for chest pain.   Gastrointestinal: Negative for abdominal pain.   Genitourinary: Negative for urgency.   Musculoskeletal: Negative for gait problem.   Neurological: Negative for weakness.   Psychiatric/Behavioral: Negative for depressed mood.           Vitals:    12/22/21 1537   BP: 120/72   Pulse: 102   Temp: 98 °F (36.7 °C)   SpO2: 98%      Objective   Physical Exam  Vitals and nursing note reviewed.   Constitutional:       Appearance: He is well-developed.   HENT:      Head: Normocephalic  and atraumatic.   Eyes:      Conjunctiva/sclera: Conjunctivae normal.      Pupils: Pupils are equal, round, and reactive to light.   Cardiovascular:      Rate and Rhythm: Normal rate and regular rhythm.      Heart sounds: Normal heart sounds. No murmur heard.      Pulmonary:      Effort: Pulmonary effort is normal. No respiratory distress.      Breath sounds: Normal breath sounds.   Abdominal:      General: Bowel sounds are normal.      Palpations: Abdomen is soft.   Musculoskeletal:         General: Normal range of motion.      Cervical back: Neck supple.   Skin:     General: Skin is warm and dry.   Neurological:      Mental Status: He is alert and oriented to person, place, and time.           Assessment/Plan   Problems Addressed this Visit     None      Visit Diagnoses     Essential hypertension        Relevant Medications    losartan-hydrochlorothiazide (Hyzaar) 100-25 MG per tablet      Diagnoses       Codes Comments    Essential hypertension     ICD-10-CM: I10  ICD-9-CM: 401.9         Recovering well from pneumonia  Stable hypertension    -Follow-up for fasting lipids and annual physical we will discuss statin etc.        Shayna Messina MD

## 2022-01-23 ENCOUNTER — HOSPITAL ENCOUNTER (OUTPATIENT)
Dept: GENERAL RADIOLOGY | Facility: HOSPITAL | Age: 57
Discharge: HOME | End: 2022-01-23

## 2022-01-23 ENCOUNTER — HOSPITAL ENCOUNTER (OUTPATIENT)
Dept: MRI IMAGING | Facility: HOSPITAL | Age: 57
Discharge: HOME | End: 2022-01-23

## 2022-01-23 DIAGNOSIS — Z98.890 STATUS POST LUMBAR SURGERY: ICD-10-CM

## 2022-01-23 DIAGNOSIS — R29.898 LEFT LEG WEAKNESS: ICD-10-CM

## 2022-01-23 DIAGNOSIS — M54.32 SCIATICA OF LEFT SIDE: ICD-10-CM

## 2022-01-23 PROCEDURE — A9577 INJ MULTIHANCE: HCPCS | Performed by: NEUROLOGICAL SURGERY

## 2022-01-23 PROCEDURE — 72114 X-RAY EXAM L-S SPINE BENDING: CPT

## 2022-01-23 PROCEDURE — 0 GADOBENATE DIMEGLUMINE 529 MG/ML SOLUTION: Performed by: NEUROLOGICAL SURGERY

## 2022-01-23 PROCEDURE — 72158 MRI LUMBAR SPINE W/O & W/DYE: CPT

## 2022-01-23 RX ADMIN — GADOBENATE DIMEGLUMINE 20 ML: 529 INJECTION, SOLUTION INTRAVENOUS at 11:42

## 2022-02-01 ENCOUNTER — OFFICE VISIT (OUTPATIENT)
Dept: NEUROSURGERY | Facility: CLINIC | Age: 57
End: 2022-02-01

## 2022-02-01 VITALS
SYSTOLIC BLOOD PRESSURE: 136 MMHG | BODY MASS INDEX: 36.71 KG/M2 | HEART RATE: 85 BPM | WEIGHT: 277 LBS | TEMPERATURE: 98.2 F | HEIGHT: 73 IN | DIASTOLIC BLOOD PRESSURE: 80 MMHG | OXYGEN SATURATION: 97 %

## 2022-02-01 DIAGNOSIS — R29.898 LEFT LEG WEAKNESS: ICD-10-CM

## 2022-02-01 DIAGNOSIS — Z98.890 STATUS POST LUMBAR SURGERY: ICD-10-CM

## 2022-02-01 DIAGNOSIS — M54.50 CHRONIC MIDLINE LOW BACK PAIN WITHOUT SCIATICA: Primary | ICD-10-CM

## 2022-02-01 DIAGNOSIS — G89.29 CHRONIC MIDLINE LOW BACK PAIN WITHOUT SCIATICA: Primary | ICD-10-CM

## 2022-02-01 PROCEDURE — 99213 OFFICE O/P EST LOW 20 MIN: CPT | Performed by: NEUROLOGICAL SURGERY

## 2022-02-10 ENCOUNTER — OFFICE VISIT (OUTPATIENT)
Dept: PAIN MEDICINE | Facility: CLINIC | Age: 57
End: 2022-02-10

## 2022-02-10 ENCOUNTER — PREP FOR SURGERY (OUTPATIENT)
Dept: SURGERY | Facility: SURGERY CENTER | Age: 57
End: 2022-02-10

## 2022-02-10 ENCOUNTER — TRANSCRIBE ORDERS (OUTPATIENT)
Dept: SURGERY | Facility: SURGERY CENTER | Age: 57
End: 2022-02-10

## 2022-02-10 VITALS
WEIGHT: 284.8 LBS | HEIGHT: 73 IN | OXYGEN SATURATION: 96 % | DIASTOLIC BLOOD PRESSURE: 81 MMHG | RESPIRATION RATE: 20 BRPM | TEMPERATURE: 97.5 F | SYSTOLIC BLOOD PRESSURE: 145 MMHG | HEART RATE: 84 BPM | BODY MASS INDEX: 37.74 KG/M2

## 2022-02-10 DIAGNOSIS — M47.816 LUMBAR FACET ARTHROPATHY: ICD-10-CM

## 2022-02-10 DIAGNOSIS — M54.16 LUMBAR RADICULAR PAIN: ICD-10-CM

## 2022-02-10 DIAGNOSIS — G89.29 OTHER CHRONIC PAIN: Primary | ICD-10-CM

## 2022-02-10 DIAGNOSIS — M51.36 DDD (DEGENERATIVE DISC DISEASE), LUMBAR: ICD-10-CM

## 2022-02-10 DIAGNOSIS — Z41.9 SURGERY, ELECTIVE: Primary | ICD-10-CM

## 2022-02-10 DIAGNOSIS — Z98.890 STATUS POST LUMBAR SURGERY: ICD-10-CM

## 2022-02-10 DIAGNOSIS — M51.36 DDD (DEGENERATIVE DISC DISEASE), LUMBAR: Primary | ICD-10-CM

## 2022-02-10 PROBLEM — M51.369 DDD (DEGENERATIVE DISC DISEASE), LUMBAR: Status: ACTIVE | Noted: 2022-02-10

## 2022-02-10 PROCEDURE — 99214 OFFICE O/P EST MOD 30 MIN: CPT | Performed by: NURSE PRACTITIONER

## 2022-02-10 RX ORDER — SODIUM CHLORIDE 0.9 % (FLUSH) 0.9 %
10 SYRINGE (ML) INJECTION AS NEEDED
Status: CANCELLED | OUTPATIENT
Start: 2022-02-10

## 2022-02-10 RX ORDER — SODIUM CHLORIDE 0.9 % (FLUSH) 0.9 %
10 SYRINGE (ML) INJECTION EVERY 12 HOURS SCHEDULED
Status: CANCELLED | OUTPATIENT
Start: 2022-02-10

## 2022-02-10 NOTE — PROGRESS NOTES
"CHIEF COMPLAINT  New Patient Ref By Bernabe Costello MD,Z98.890 (ICD-10-CM) - Status post lumbar surgery,M54.50,G89.29 (ICD-10-CM) - Chronic midline low back pain without sciatica.  Pt states his pain started back in 2020 when he was diagnosed with spinal stenosis. Describes his pain as aching and shooting pain .  Pt states he has never been to a pain management clinic . Had kidney stones removed around 09/2021 .   Pt  attended  PT @ [facility]  07/2021. Is currently taking ibuprofen PRN for pain.     Subjective   Wei Salgado \"CAITLYN\" is a  RHD 56 y.o. male.   He presents to the office for evaluation of chronic back pain. He was referred here by Dr Costello.  He lives with his wife and family. He works full-time in cabinet making/woodworking.  Does not smoke, never has. Does drink alcohol, 1/year. Denies any illicit substance use. Family history is positive for alcoholism(father) and negative for addiction. Family history is negative back problems.     Complains of pain in his low back and legs.  Describes his pain as continuous aching and annoying. Also complains of constant feeling of weakness in both of his legs. Pain increases with bending/lifting/twisting, working, reaching, standing(makes leg pain worse); pain decreases with rest, laying down, and medication. Takes intermittently Ibuprofen or Armen Back and body.  ADL's by self. Denies any bowel or bladder incontinence.     His back pain started in 2020. He was diagnosed with spinal stenosis. Eventually saw Dr Costello. Had surgery in June 2021. Referred to PT post-op.  Reports his leg heaviness was improved after surgery.  However, his back pain persisted. Has tried PT. Never had any lumbar interventions such as epidurals or lumbar facet injections.     Patient remained masked during entire encounter. No cough present. I donned a mask and eye protection throughout entire visit. Prior to donning mask and eye protection, hand " hygiene was performed, as well as when it was doffed.  I was closer than 6 feet, but not for an extended period of time. No obvious exposure to any bodily fluids.    Back Pain  This is a chronic problem. The current episode started more than 1 year ago. The problem occurs constantly. The problem has been gradually worsening since onset. The pain is present in the lumbar spine. The quality of the pain is described as aching. The pain is at a severity of 5/10. The pain is moderate. The pain is worse during the day. The symptoms are aggravated by bending, position, standing and twisting. Associated symptoms include numbness (fingers) and weakness (bilateral legs). Pertinent negatives include no abdominal pain, bladder incontinence, bowel incontinence, chest pain, dysuria, fever or headaches.      PEG Assessment   What number best describes your pain on average in the past week?6  What number best describes how, during the past week, pain has interfered with your enjoyment of life?6  What number best describes how, during the past week, pain has interfered with your general activity?  6      Current Outpatient Medications:   •  fluticasone (FLONASE) 50 MCG/ACT nasal spray, 2 sprays into the nostril(s) as directed by provider Daily., Disp: , Rfl:   •  losartan-hydrochlorothiazide (Hyzaar) 100-25 MG per tablet, Take 1 tablet by mouth Daily., Disp: 90 tablet, Rfl: 1    The following portions of the patient's history were reviewed and updated as appropriate: allergies, current medications, past family history, past medical history, past social history, past surgical history and problem list.    REVIEW OF PERTINENT MEDICAL DATA    Reviewed Dr Costello office visit 2-1-22 (neurosurgery)--patient presents for follow-up of lumbar MRIs and x-rays.  Has history of low back pain that radiates into left leg with weakness.  The preoperative radiating pain into his legs has resolved.  He has a weak sensation in the left leg at times.   Denies any numbness.  Has had good results temporarily with steroid pack.  X-ray shows decompressive laminectomy present at L3 and 4 with no evidence of subluxation in flexion and extension.  Degenerative changes notable at L4-5 and L5-S1.  Lumbar MRI reveals interval surgery with L3 and L4 laminectomies with bilateral decompression at L3-4 and L4-5.  There is some residual lateral recess narrowing at L3-4 and L4-5.  Recommend referral to interventional pain management.    He had a L3-4 laminectomy for bilateral decompression on 06-28-21.    Narrative & Impression   MRI OF THE LUMBAR SPINE WITH AND WITHOUT CONTRAST 01/23/2022     CLINICAL HISTORY: Patient had lumbar spine surgery on 06/28/2021 with  lumbar laminectomy at L3-L4 for decompression. The patient complains of  left lower extremity pain and weakness.     TECHNIQUE: Sagittal T1, proton density and fat-suppressed T2  postcontrast sagittal T1-weighted images were obtained of the lumbar  spine, in addition, axial T2-weighted images were obtained from T12 to  S1, thin cut pre and postcontrast axial T1-weighted images were obtained  angled through the interspaces from L3 to S1.     COMPARISON: This is correlated to a prior MRI lumbar spine on  05/14/2021.     FINDINGS: The distal thoracic cord and conus is normal in signal  intensity, conus terminates at L1 lumbar level which is normal.     At T12-L1, the disc space and facets are normal with no canal or  foraminal narrowing.     At L1-L2, the posterior disc margin is normal. There is minimal facet  overgrowth.There is no canal or foraminal narrowing.     At L2-L3, there is minimal diffuse posterior disc bulge. Facets are  normal. There is minimal canal narrowing, no foraminal narrowing.     At L3-L4, since prior outside MRI of lumbar spine 05/14/2021, patient  has had interval surgery at the L3-L4 level on 06/28/2021, during which  there was performance of bilateral laminectomies at L3 and bilateral  medial  facetectomies at L3-L4. There remains mild right and  mild-to-moderate left facet overgrowth, some fluid in the facet joints.  There is diffuse posterior disc bulge. There has been resolution of  canal narrowing. There remains slight narrowing of the lateral recesses.  There is some spurring and bulging disc material into the left neural  foramen, mildly narrows the left foramen and may abut the exiting left  L3 nerve root. There is no right foraminal narrowing.     At L4-5, there is mild left facet overgrowth. There is 1-2 mm  retrolisthesis of L3 on L4, and mild diffuse posterior disc osteophyte  complex. There is no canal narrowing. There is perhaps slight narrowing  of the left lateral recess. There is some spurring and bulging disc  material into the foramina and there is mild bilateral foraminal  narrowing.     At L5-S1, there is mild bilateral facet overgrowth. Posterior disc  margin is normal. There is no canal or lateral recess or foraminal  narrowing.     IMPRESSION:  1. Since the prior outside MRI of the lumbar spine on 05/14/2021, the  patient had interval surgery at L3-4 lumbar level on 06/28/2021. There  is been performance of bilateral laminectomies at L3 and bilateral  medial facetectomies at L3-4. There remains mild right and mild/moderate  left facet overgrowth with a small amount of fluid in the facet joints.  There is a diffuse posterior disc bulge. There has been interval  resolution of the canal narrowing at L3-4 but there remains slight  narrowing lateral recesses and some spurring and bulging disc material  into left foramen which mildly narrows the left foramen may abut the  exiting left L3 nerve root.  2. Otherwise, there's been no change when compared to the prior MRI of  the lumbar spine on 05/14/2021. At L4-5, there is mild bilateral facet  overgrowth and 1 to 2 mm retrolisthesis of L4 and L5 and a mild diffuse  posterior disc osteophyte complex but there is no canal narrowing and  there  "is perhaps slight narrowing of the left lateral recess. There is  some mild spurring and bulging disc material into the neural foramina  with mild bilateral foraminal narrowing. No additional canal or  foraminal narrowing is seen in lumbar spine.     This report was finalized on 1/24/2022 8:33 AM by Dr. Gutierrez Greene M.D.          Imaging    MRI Lumbar Spine With & Without Contrast (Order: [id number]) - 1/23/2022    Narrative & Impression   LUMBAR SPINE COMPLETE WITH FLEXION AND EXTENSION 1-23-22     HISTORY: Back pain.     FINDINGS: AP, lateral, bilateral oblique as well as flexion and  extension views of the lumbar spine demonstrate moderate facet  degenerative disease at L4-L5 and L5-S1. The patient has undergone  decompressive laminectomies at L3 and L4. There was no evidence of  subluxation in flexion or extension.     This report was finalized on 1/23/2022 7:36 PM by Dr. Jonah Alicea M.D.               Review of Systems   Constitutional: Positive for fatigue. Negative for activity change and fever.   HENT: Negative for congestion.    Eyes: Negative for visual disturbance.   Respiratory: Negative for cough and chest tightness.    Cardiovascular: Negative for chest pain.   Gastrointestinal: Negative for abdominal pain, bowel incontinence, constipation and diarrhea.   Genitourinary: Negative for bladder incontinence, difficulty urinating and dysuria.   Musculoskeletal: Positive for back pain.   Neurological: Positive for weakness (bilateral legs) and numbness (fingers). Negative for dizziness, light-headedness and headaches.   Psychiatric/Behavioral: Positive for agitation. Negative for sleep disturbance and suicidal ideas. The patient is not nervous/anxious.        Vitals:    02/10/22 0808   BP: 145/81   Pulse: 84   Resp: 20   Temp: 97.5 °F (36.4 °C)   SpO2: 96%   Weight: 129 kg (284 lb 12.8 oz)   Height: 185.4 cm (72.99\")   PainSc:   5   PainLoc: Back     Objective   Physical Exam  Vitals and nursing note " reviewed.   Constitutional:       Appearance: He is well-developed.   HENT:      Head: Normocephalic and atraumatic.   Musculoskeletal:      Lumbar back: Tenderness and bony tenderness (minimal lumbar facet tenderness) present. Decreased range of motion. Positive right straight leg raise test and positive left straight leg raise test.        Back:       Comments: Moderate tenderness of bilateral SI joints    +WINDY on left, equivocal on right    Negative piriformis tenderness bilaterally   Neurological:      Mental Status: He is alert.      Gait: Gait abnormal.      Deep Tendon Reflexes:      Reflex Scores:       Patellar reflexes are 1+ on the right side and 1+ on the left side.       Achilles reflexes are 1+ on the right side and 1+ on the left side.  Psychiatric:         Speech: Speech normal.         Behavior: Behavior normal.         Thought Content: Thought content normal.         Judgment: Judgment normal.           Assessment/Plan   Diagnoses and all orders for this visit:    1. Other chronic pain (Primary)    2. DDD (degenerative disc disease), lumbar    3. Lumbar facet arthropathy    4. Lumbar radicular pain      --- Lumbar epidural steroid injection Lumbar 4-5 of L5-S1. No blood thinners. Reviewed the procedure at length with the patient.  Included in the review was expectations, complications, risk and benefits.The procedure was described in detail and the risks, benefits and alternatives were discussed with the patient (including but not limited to: bleeding, infection, nerve damage, worsening of pain, inability to perform injection, paralysis, seizures, coma, no pain relief and death) who agreed to proceed.  Discussed the potential for sedation if warranted/wanted.  The procedure will plan to be performed at [facility] with fluoroscopic guidance(unless ultrasound is indicated) and could potentially have steroids and contrast dye used. Questions were answered and in a way the  patient could understand.  Patient verbalized understanding and wishes to proceed.  This intervention will be ordered.  Discussed with patient that all procedures are part of a multimodal plan of care and include either formal PT or a home exercise program.  Patient has no evidence of coagulopathy or current infection.  --- Consider bilateral Si joint injections in future as needed.  --- Follow-up after procedure or sooner if needed.      ---  Indications for epidural injection:  Plan is to proceed with epidural at the appropriate level.  If the patient receives significant pain reduction and improvement in function and the plan will be to repeat the epidural when the pain worsens.  If a second epidural provides at least 6 weeks of sustained improvement that includes both pain reduction and improvement in function then an epidural injection could be repeated once again at the same level.  This is a mutual decision between the clinician and the patient that includes discussions including risks and benefits in detail as well as alternative therapies.  Patient's questions were answered to their satisfaction and to their understanding.  ---         MER REPORT    As part of the patient's treatment plan, I am prescribing controlled substances. The patient has been made aware of appropriate use of such medications, including potential risk of somnolence, limited ability to drive and/or work safely, and the potential for dependence or overdose. It has also bee made clear that these medications are for use by this patient only, without concomitant use of alcohol or other substances unless prescribed.     Patient has completed prescribing agreement detailing terms of continued prescribing of controlled substances, including monitoring MER reports, urine drug screening, and pill counts if necessary. The patient is aware that inappropriate use will results in cessation of prescribing such medications.    MER report has  been reviewed and scanned into the patient's chart.    As the clinician, I personally reviewed the MER from 2-10-22 while the patient was in the office today.    History and physical exam exhibit continued safe and appropriate use of controlled substances.        Dictated utilizing Dragon dictation.     This document is intended for medical expert use only. Reading of this document by patients and/or patient's family without participating medical staff guidance may result in misinterpretation and unintended morbidity.   Any interpretation of such data is the responsibility of the patient and/or family member responsible for the patient in concert with their primary or specialist providers, not to be left for sources of online searches such as NEMOPTIC, WEEZEVENT or similar queries. Relying on these approaches to knowledge may result in misinterpretation, misguided goals of care and even death should patients or family members try recommendations outside of the realm of professional medical care in a supervised way.

## 2022-02-10 NOTE — H&P (VIEW-ONLY)
"CHIEF COMPLAINT  New Patient Ref By Bernabe Costello MD,Z98.890 (ICD-10-CM) - Status post lumbar surgery,M54.50,G89.29 (ICD-10-CM) - Chronic midline low back pain without sciatica.  Pt states his pain started back in 2020 when he was diagnosed with spinal stenosis. Describes his pain as aching and shooting pain .  Pt states he has never been to a pain management clinic . Had kidney stones removed around 09/2021 .   Pt  attended  PT @ [facility]  07/2021. Is currently taking ibuprofen PRN for pain.     Subjective   Wei Salgado \"CAITLYN\" is a  RHD 56 y.o. male.   He presents to the office for evaluation of chronic back pain. He was referred here by Dr Costello.  He lives with his wife and family. He works full-time in cabinet making/woodworking.  Does not smoke, never has. Does drink alcohol, 1/year. Denies any illicit substance use. Family history is positive for alcoholism(father) and negative for addiction. Family history is negative back problems.     Complains of pain in his low back and legs.  Describes his pain as continuous aching and annoying. Also complains of constant feeling of weakness in both of his legs. Pain increases with bending/lifting/twisting, working, reaching, standing(makes leg pain worse); pain decreases with rest, laying down, and medication. Takes intermittently Ibuprofen or Armen Back and body.  ADL's by self. Denies any bowel or bladder incontinence.     His back pain started in 2020. He was diagnosed with spinal stenosis. Eventually saw Dr Costello. Had surgery in June 2021. Referred to PT post-op.  Reports his leg heaviness was improved after surgery.  However, his back pain persisted. Has tried PT. Never had any lumbar interventions such as epidurals or lumbar facet injections.     Patient remained masked during entire encounter. No cough present. I donned a mask and eye protection throughout entire visit. Prior to donning mask and eye protection, hand " hygiene was performed, as well as when it was doffed.  I was closer than 6 feet, but not for an extended period of time. No obvious exposure to any bodily fluids.    Back Pain  This is a chronic problem. The current episode started more than 1 year ago. The problem occurs constantly. The problem has been gradually worsening since onset. The pain is present in the lumbar spine. The quality of the pain is described as aching. The pain is at a severity of 5/10. The pain is moderate. The pain is worse during the day. The symptoms are aggravated by bending, position, standing and twisting. Associated symptoms include numbness (fingers) and weakness (bilateral legs). Pertinent negatives include no abdominal pain, bladder incontinence, bowel incontinence, chest pain, dysuria, fever or headaches.      PEG Assessment   What number best describes your pain on average in the past week?6  What number best describes how, during the past week, pain has interfered with your enjoyment of life?6  What number best describes how, during the past week, pain has interfered with your general activity?  6      Current Outpatient Medications:   •  fluticasone (FLONASE) 50 MCG/ACT nasal spray, 2 sprays into the nostril(s) as directed by provider Daily., Disp: , Rfl:   •  losartan-hydrochlorothiazide (Hyzaar) 100-25 MG per tablet, Take 1 tablet by mouth Daily., Disp: 90 tablet, Rfl: 1    The following portions of the patient's history were reviewed and updated as appropriate: allergies, current medications, past family history, past medical history, past social history, past surgical history and problem list.    REVIEW OF PERTINENT MEDICAL DATA    Reviewed Dr Costello office visit 2-1-22 (neurosurgery)--patient presents for follow-up of lumbar MRIs and x-rays.  Has history of low back pain that radiates into left leg with weakness.  The preoperative radiating pain into his legs has resolved.  He has a weak sensation in the left leg at times.   Denies any numbness.  Has had good results temporarily with steroid pack.  X-ray shows decompressive laminectomy present at L3 and 4 with no evidence of subluxation in flexion and extension.  Degenerative changes notable at L4-5 and L5-S1.  Lumbar MRI reveals interval surgery with L3 and L4 laminectomies with bilateral decompression at L3-4 and L4-5.  There is some residual lateral recess narrowing at L3-4 and L4-5.  Recommend referral to interventional pain management.    He had a L3-4 laminectomy for bilateral decompression on 06-28-21.    Narrative & Impression   MRI OF THE LUMBAR SPINE WITH AND WITHOUT CONTRAST 01/23/2022     CLINICAL HISTORY: Patient had lumbar spine surgery on 06/28/2021 with  lumbar laminectomy at L3-L4 for decompression. The patient complains of  left lower extremity pain and weakness.     TECHNIQUE: Sagittal T1, proton density and fat-suppressed T2  postcontrast sagittal T1-weighted images were obtained of the lumbar  spine, in addition, axial T2-weighted images were obtained from T12 to  S1, thin cut pre and postcontrast axial T1-weighted images were obtained  angled through the interspaces from L3 to S1.     COMPARISON: This is correlated to a prior MRI lumbar spine on  05/14/2021.     FINDINGS: The distal thoracic cord and conus is normal in signal  intensity, conus terminates at L1 lumbar level which is normal.     At T12-L1, the disc space and facets are normal with no canal or  foraminal narrowing.     At L1-L2, the posterior disc margin is normal. There is minimal facet  overgrowth.There is no canal or foraminal narrowing.     At L2-L3, there is minimal diffuse posterior disc bulge. Facets are  normal. There is minimal canal narrowing, no foraminal narrowing.     At L3-L4, since prior outside MRI of lumbar spine 05/14/2021, patient  has had interval surgery at the L3-L4 level on 06/28/2021, during which  there was performance of bilateral laminectomies at L3 and bilateral  medial  facetectomies at L3-L4. There remains mild right and  mild-to-moderate left facet overgrowth, some fluid in the facet joints.  There is diffuse posterior disc bulge. There has been resolution of  canal narrowing. There remains slight narrowing of the lateral recesses.  There is some spurring and bulging disc material into the left neural  foramen, mildly narrows the left foramen and may abut the exiting left  L3 nerve root. There is no right foraminal narrowing.     At L4-5, there is mild left facet overgrowth. There is 1-2 mm  retrolisthesis of L3 on L4, and mild diffuse posterior disc osteophyte  complex. There is no canal narrowing. There is perhaps slight narrowing  of the left lateral recess. There is some spurring and bulging disc  material into the foramina and there is mild bilateral foraminal  narrowing.     At L5-S1, there is mild bilateral facet overgrowth. Posterior disc  margin is normal. There is no canal or lateral recess or foraminal  narrowing.     IMPRESSION:  1. Since the prior outside MRI of the lumbar spine on 05/14/2021, the  patient had interval surgery at L3-4 lumbar level on 06/28/2021. There  is been performance of bilateral laminectomies at L3 and bilateral  medial facetectomies at L3-4. There remains mild right and mild/moderate  left facet overgrowth with a small amount of fluid in the facet joints.  There is a diffuse posterior disc bulge. There has been interval  resolution of the canal narrowing at L3-4 but there remains slight  narrowing lateral recesses and some spurring and bulging disc material  into left foramen which mildly narrows the left foramen may abut the  exiting left L3 nerve root.  2. Otherwise, there's been no change when compared to the prior MRI of  the lumbar spine on 05/14/2021. At L4-5, there is mild bilateral facet  overgrowth and 1 to 2 mm retrolisthesis of L4 and L5 and a mild diffuse  posterior disc osteophyte complex but there is no canal narrowing and  there  "is perhaps slight narrowing of the left lateral recess. There is  some mild spurring and bulging disc material into the neural foramina  with mild bilateral foraminal narrowing. No additional canal or  foraminal narrowing is seen in lumbar spine.     This report was finalized on 1/24/2022 8:33 AM by Dr. Gutierrez Greene M.D.          Imaging    MRI Lumbar Spine With & Without Contrast (Order: [id number]) - 1/23/2022    Narrative & Impression   LUMBAR SPINE COMPLETE WITH FLEXION AND EXTENSION 1-23-22     HISTORY: Back pain.     FINDINGS: AP, lateral, bilateral oblique as well as flexion and  extension views of the lumbar spine demonstrate moderate facet  degenerative disease at L4-L5 and L5-S1. The patient has undergone  decompressive laminectomies at L3 and L4. There was no evidence of  subluxation in flexion or extension.     This report was finalized on 1/23/2022 7:36 PM by Dr. Jonah Alicea M.D.               Review of Systems   Constitutional: Positive for fatigue. Negative for activity change and fever.   HENT: Negative for congestion.    Eyes: Negative for visual disturbance.   Respiratory: Negative for cough and chest tightness.    Cardiovascular: Negative for chest pain.   Gastrointestinal: Negative for abdominal pain, bowel incontinence, constipation and diarrhea.   Genitourinary: Negative for bladder incontinence, difficulty urinating and dysuria.   Musculoskeletal: Positive for back pain.   Neurological: Positive for weakness (bilateral legs) and numbness (fingers). Negative for dizziness, light-headedness and headaches.   Psychiatric/Behavioral: Positive for agitation. Negative for sleep disturbance and suicidal ideas. The patient is not nervous/anxious.        Vitals:    02/10/22 0808   BP: 145/81   Pulse: 84   Resp: 20   Temp: 97.5 °F (36.4 °C)   SpO2: 96%   Weight: 129 kg (284 lb 12.8 oz)   Height: 185.4 cm (72.99\")   PainSc:   5   PainLoc: Back     Objective   Physical Exam  Vitals and nursing note " reviewed.   Constitutional:       Appearance: He is well-developed.   HENT:      Head: Normocephalic and atraumatic.   Musculoskeletal:      Lumbar back: Tenderness and bony tenderness (minimal lumbar facet tenderness) present. Decreased range of motion. Positive right straight leg raise test and positive left straight leg raise test.        Back:       Comments: Moderate tenderness of bilateral SI joints    +WINDY on left, equivocal on right    Negative piriformis tenderness bilaterally   Neurological:      Mental Status: He is alert.      Gait: Gait abnormal.      Deep Tendon Reflexes:      Reflex Scores:       Patellar reflexes are 1+ on the right side and 1+ on the left side.       Achilles reflexes are 1+ on the right side and 1+ on the left side.  Psychiatric:         Speech: Speech normal.         Behavior: Behavior normal.         Thought Content: Thought content normal.         Judgment: Judgment normal.           Assessment/Plan   Diagnoses and all orders for this visit:    1. Other chronic pain (Primary)    2. DDD (degenerative disc disease), lumbar    3. Lumbar facet arthropathy    4. Lumbar radicular pain      --- Lumbar epidural steroid injection Lumbar 4-5 of L5-S1. No blood thinners. Reviewed the procedure at length with the patient.  Included in the review was expectations, complications, risk and benefits.The procedure was described in detail and the risks, benefits and alternatives were discussed with the patient (including but not limited to: bleeding, infection, nerve damage, worsening of pain, inability to perform injection, paralysis, seizures, coma, no pain relief and death) who agreed to proceed.  Discussed the potential for sedation if warranted/wanted.  The procedure will plan to be performed at [facility] with fluoroscopic guidance(unless ultrasound is indicated) and could potentially have steroids and contrast dye used. Questions were answered and in a way the  patient could understand.  Patient verbalized understanding and wishes to proceed.  This intervention will be ordered.  Discussed with patient that all procedures are part of a multimodal plan of care and include either formal PT or a home exercise program.  Patient has no evidence of coagulopathy or current infection.  --- Consider bilateral Si joint injections in future as needed.  --- Follow-up after procedure or sooner if needed.      ---  Indications for epidural injection:  Plan is to proceed with epidural at the appropriate level.  If the patient receives significant pain reduction and improvement in function and the plan will be to repeat the epidural when the pain worsens.  If a second epidural provides at least 6 weeks of sustained improvement that includes both pain reduction and improvement in function then an epidural injection could be repeated once again at the same level.  This is a mutual decision between the clinician and the patient that includes discussions including risks and benefits in detail as well as alternative therapies.  Patient's questions were answered to their satisfaction and to their understanding.  ---         MER REPORT    As part of the patient's treatment plan, I am prescribing controlled substances. The patient has been made aware of appropriate use of such medications, including potential risk of somnolence, limited ability to drive and/or work safely, and the potential for dependence or overdose. It has also bee made clear that these medications are for use by this patient only, without concomitant use of alcohol or other substances unless prescribed.     Patient has completed prescribing agreement detailing terms of continued prescribing of controlled substances, including monitoring MER reports, urine drug screening, and pill counts if necessary. The patient is aware that inappropriate use will results in cessation of prescribing such medications.    MER report has  been reviewed and scanned into the patient's chart.    As the clinician, I personally reviewed the MER from 2-10-22 while the patient was in the office today.    History and physical exam exhibit continued safe and appropriate use of controlled substances.        Dictated utilizing Dragon dictation.     This document is intended for medical expert use only. Reading of this document by patients and/or patient's family without participating medical staff guidance may result in misinterpretation and unintended morbidity.   Any interpretation of such data is the responsibility of the patient and/or family member responsible for the patient in concert with their primary or specialist providers, not to be left for sources of online searches such as [employer], Alexis Bittar or similar queries. Relying on these approaches to knowledge may result in misinterpretation, misguided goals of care and even death should patients or family members try recommendations outside of the realm of professional medical care in a supervised way.

## 2022-02-15 ENCOUNTER — LAB (OUTPATIENT)
Dept: LAB | Facility: SURGERY CENTER | Age: 57
End: 2022-02-15

## 2022-02-15 DIAGNOSIS — M51.36 DDD (DEGENERATIVE DISC DISEASE), LUMBAR: ICD-10-CM

## 2022-02-15 DIAGNOSIS — M54.16 LUMBAR RADICULAR PAIN: ICD-10-CM

## 2022-02-15 DIAGNOSIS — Z98.890 STATUS POST LUMBAR SURGERY: ICD-10-CM

## 2022-02-15 PROCEDURE — C9803 HOPD COVID-19 SPEC COLLECT: HCPCS

## 2022-02-15 PROCEDURE — U0004 COV-19 TEST NON-CDC HGH THRU: HCPCS | Performed by: ANESTHESIOLOGY

## 2022-02-16 LAB — SARS-COV-2 ORF1AB RESP QL NAA+PROBE: NOT DETECTED

## 2022-02-17 ENCOUNTER — HOSPITAL ENCOUNTER (OUTPATIENT)
Dept: GENERAL RADIOLOGY | Facility: SURGERY CENTER | Age: 57
Setting detail: HOSPITAL OUTPATIENT SURGERY
End: 2022-02-17

## 2022-02-17 ENCOUNTER — HOSPITAL ENCOUNTER (OUTPATIENT)
Facility: SURGERY CENTER | Age: 57
Setting detail: HOSPITAL OUTPATIENT SURGERY
Discharge: HOME | End: 2022-02-17
Attending: ANESTHESIOLOGY | Admitting: ANESTHESIOLOGY

## 2022-02-17 VITALS
TEMPERATURE: 97.4 F | HEIGHT: 73 IN | WEIGHT: 285 LBS | SYSTOLIC BLOOD PRESSURE: 138 MMHG | HEART RATE: 74 BPM | DIASTOLIC BLOOD PRESSURE: 87 MMHG | OXYGEN SATURATION: 97 % | RESPIRATION RATE: 16 BRPM | BODY MASS INDEX: 37.77 KG/M2

## 2022-02-17 DIAGNOSIS — M51.36 DDD (DEGENERATIVE DISC DISEASE), LUMBAR: ICD-10-CM

## 2022-02-17 DIAGNOSIS — Z98.890 STATUS POST LUMBAR SURGERY: ICD-10-CM

## 2022-02-17 DIAGNOSIS — Z41.9 SURGERY, ELECTIVE: ICD-10-CM

## 2022-02-17 DIAGNOSIS — M54.16 LUMBAR RADICULAR PAIN: ICD-10-CM

## 2022-02-17 PROCEDURE — 77002 NEEDLE LOCALIZATION BY XRAY: CPT

## 2022-02-17 PROCEDURE — 25010000002 METHYLPREDNISOLONE PER 80 MG: Performed by: ANESTHESIOLOGY

## 2022-02-17 PROCEDURE — 0 IOHEXOL 300 MG/ML SOLUTION 10 ML VIAL: Performed by: ANESTHESIOLOGY

## 2022-02-17 PROCEDURE — 62323 NJX INTERLAMINAR LMBR/SAC: CPT | Performed by: ANESTHESIOLOGY

## 2022-02-17 PROCEDURE — 76000 FLUOROSCOPY <1 HR PHYS/QHP: CPT

## 2022-02-17 RX ORDER — SODIUM CHLORIDE 0.9 % (FLUSH) 0.9 %
10 SYRINGE (ML) INJECTION EVERY 12 HOURS SCHEDULED
Status: DISCONTINUED | OUTPATIENT
Start: 2022-02-17 | End: 2022-02-17 | Stop reason: HOSPADM

## 2022-02-17 RX ORDER — SODIUM CHLORIDE 0.9 % (FLUSH) 0.9 %
10 SYRINGE (ML) INJECTION AS NEEDED
Status: DISCONTINUED | OUTPATIENT
Start: 2022-02-17 | End: 2022-02-17 | Stop reason: HOSPADM

## 2022-02-17 RX ORDER — ASPIRIN 325 MG
500 TABLET ORAL DAILY
COMMUNITY
End: 2022-04-26

## 2022-02-17 RX ORDER — IBUPROFEN 200 MG
200 TABLET ORAL EVERY 6 HOURS PRN
COMMUNITY
End: 2022-06-16

## 2022-02-17 NOTE — DISCHARGE INSTRUCTIONS
[facility] - Post-procedure Instructions          --  While there are no absolute restrictions, it is recommended that you do not perform strenuous activity today. In the morning, you may resume your level of activity as before your block.    --  If you have a band-aid at your injection site, please remove it later today. Observe the area for any redness, swelling, pus-like drainage, or a temperature over 101°. If any of these symptoms occur, please call your doctor at [phone]. If after office hours, leave a message and the on-call provider will return your call.    --  Ice may be applied to your injection site. It is recommended you avoid direct heat (heating pad; hot tub) for 1-2 days.    --  Call [facility] at [phone] if you experience persistent headache, persistent bleeding from the injection site, or severe pain not relieved by heat or oral medication.    --  Do not make important decisions today.    --  Due to the effects of the block and/or the I.V. Sedation, DO NOT drive or operate hazardous machinery for 12 hours.  Local anesthetics may cause numbness after procedure and precautions must be taken with regards to operating equipment as well as with walking, even if ambulating with assistance of another person or with an assistive device.    --  Do not drink alcohol for 12 hours.    -- You may return to work tomorrow, or as directed by your referring doctor.    --  Occasionally you may notice a slight increase in your pain after the procedure. This should start to improve within the next 24-48 hours. Radiofrequency ablation procedure pain may last 3-4 weeks.    --  It may take as long as 3-4 days before you notice a gradual improvement in your pain and/or other symptoms.    -- You may continue to take your prescribed pain medication as needed.    --  Some normal possible side effects of steroid use could include fluid retention, increased blood sugar, dull headache,  increased sweating, increased appetite, mood swings and flushing.    --  Diabetics are recommended to watch their blood glucose level closely for 24-48 hours after the injection.    --  Must stay in PACU for 20 min upon arrival and prove no leg weakness before being discharged.    --  IN THE EVENT OF A LIFE THREATENING EMERGENCY, (CHEST PAIN, BREATHING DIFFICULTIES, PARALYSIS…) YOU SHOULD GO TO YOUR NEAREST EMERGENCY ROOM.    --  You should be contacted by our office within 2-3 days to schedule follow up or next appointment date.  If not contacted within 7 days, please call the office at [phone]

## 2022-02-17 NOTE — OP NOTE
Lumbar Epidural Steroid Injection  [facility]    PREOPERATIVE DIAGNOSIS:   Lumbar Postlaminectomy Syndrome and bilateral Lumbar Radiculopathy  POSTOPERATIVE DIAGNOSIS:  Same as preop diagnosis    PROCEDURE:   Lumbar Epidural Steroid Injection, Therapeutic Translaminar Injection, with epidurogram, at  L5/S1 level    PRE-PROCEDURE DISCUSSION WITH PATIENT:    Risks and complications were discussed with the patient prior to starting the procedure and informed consent was obtained.  We discussed various topics including but not limited to bleeding, infection, injury, paralysis, nerve injury, dural puncture, coma, death, worsening of clinical picture, lack of pain relief, and postprocedural soreness.    SURGEON:  Mitch Acosta MD    REASON FOR PROCEDURE:    Degenerative changes are noted in the area. and Radiating pattern of pain is likely consistent with degenerative changes in the area.    SEDATION:  Patient declined administration of moderate sedation    ANESTHETIC:  Marcaine 0.25%  STEROID:   Methylprednisolone (DEPO MEDROL) 80mg/ml    DESCRIPTON OF PROCEDURE:    After obtaining informed consent, I.V. was not started in the preop area.   The patient was taken to the operating room and placed in the prone position.  EKG, blood pressure, and pulse oximeter were monitored throughout, and sedation was provided as needed by the RN under my guidance. All pressure points were well padded.  The lumbar spine area was prepped with Chloraprep and draped in a sterile fashion.  Under fluoroscopic guidance, the above mentioned interlaminar space was identified. Skin and subcutaneous tissues were anesthetized with 1% lidocaine in the middle of the space. A Tuohy needle was introduced through the skin and advanced to this interlaminar space and into the epidural space under fluoroscopic guidance and verified with loss-of-resistance technique to air.  After confirming the position of the needle with the  fluoroscope with all the views, and after aspiration was confirmed negative for blood and CSF, 1.5 mL of Omnipaque was injected.  After seeing appropriate epidurogram with lateral and PA views, a total of 3 cc solution was injected, consisting of 2cc of local anesthetic as above, with normal saline and injectable steroid as above.     ESTIMATED BLOOD LOSS:  <5 mL  SPECIMENS:  None    COMPLICATIONS:     No complications were noted., There was no indication of vascular uptake on live injection of contrast dye., There was no indication of intrathecal uptake on live injection of contrast dye., There was not any evidence of dural puncture.   and The patient did not have any signs of postprocedure numbness nor weakness.    TOLERANCE & DISCHARGE CONDITION:    The patient tolerated the procedure well.  The patient was transported to the recovery area without difficulties.  The patient was discharged to home under the care of family in stable and satisfactory condition.    PLAN OF CARE:  1. The patient was given our standard instruction sheet.  2. The patient will Return to clinic 4 wks  3. The patient will resume all medications as per the medication reconciliation sheet.

## 2022-02-25 ENCOUNTER — OFFICE VISIT (OUTPATIENT)
Dept: FAMILY MEDICINE CLINIC | Facility: CLINIC | Age: 57
End: 2022-02-25

## 2022-02-25 VITALS
TEMPERATURE: 97.8 F | DIASTOLIC BLOOD PRESSURE: 88 MMHG | HEART RATE: 86 BPM | WEIGHT: 287 LBS | HEIGHT: 73 IN | BODY MASS INDEX: 38.04 KG/M2 | OXYGEN SATURATION: 96 % | SYSTOLIC BLOOD PRESSURE: 130 MMHG

## 2022-02-25 DIAGNOSIS — E78.49 OTHER HYPERLIPIDEMIA: Primary | ICD-10-CM

## 2022-02-25 DIAGNOSIS — R73.01 ELEVATED FASTING BLOOD SUGAR: ICD-10-CM

## 2022-02-25 DIAGNOSIS — I10 PRIMARY HYPERTENSION: ICD-10-CM

## 2022-02-25 PROCEDURE — 99214 OFFICE O/P EST MOD 30 MIN: CPT | Performed by: FAMILY MEDICINE

## 2022-02-25 RX ORDER — SIMVASTATIN 10 MG
10 TABLET ORAL NIGHTLY
Qty: 90 TABLET | Refills: 1 | Status: SHIPPED | OUTPATIENT
Start: 2022-02-25 | End: 2022-06-17

## 2022-02-25 NOTE — PROGRESS NOTES
Chief Complaint   Patient presents with   • Hypertension       Subjective   Wei Salgado is a 56 y.o. male.     History of Present Illness   The 10-year ASCVD risk score (Tiny VIDHI Jr., et al., 2013) is: 8.5%    Values used to calculate the score:      Age: 56 years      Sex: Male      Is Non- : No      Diabetic: No      Tobacco smoker: No      Systolic Blood Pressure: 130 mmHg      Is BP treated: Yes      HDL Cholesterol: 49 mg/dL      Total Cholesterol: 225 mg/dL    Non-smoker.  Father with history of open bypass and strokes and hyperlipidemia    Hypertension stable on Hyzaar asymptomatic  Is having some leg cramps    Blood sugars have been elevated he states eating candy/sodas    The following portions of the patient's history were reviewed and updated as appropriate: allergies, current medications, past family history, past medical history, past social history, past surgical history and problem list.      Past Medical History:   Diagnosis Date   • Allergic    • Arthritis    • Headache    • Hyperlipidemia    • Hypertension    • Sleep apnea     USES CPAP        Past Surgical History:   Procedure Laterality Date   • BACK SURGERY     • COLONOSCOPY N/A 11/20/2020    Procedure: COLONOSCOPY INTO CECUM;  Surgeon: Jesse Frey MD;  Location: [facility] ENDOSCOPY;  Service: Gastroenterology;  Laterality: N/A;  PRE: SCREENING  POST: HEMORRHOIDS   • CYSTOSCOPY URETEROSCOPY LASER LITHOTRIPSY Left 9/27/2021    Procedure: LEFT URETEROSCOPY LASER LITHOTRIPSY, STONE BASKET, POSSIBLE STENT;  Surgeon: Jethro Valentin MD;  Location: [facility] OR;  Service: Urology;  Laterality: Left;   • HAND SURGERY  1977   • LUMBAR EPIDURAL INJECTION N/A 2/17/2022    Procedure: lumbar epidural steroid injection lumbar4-5 or lumbar5-sacral1;  Surgeon: Mitch Acosta MD;  Location: [facility] OR;  Service: Pain Management;  Laterality: N/A;   • LUMBAR LAMINECTOMY DISCECTOMY DECOMPRESSION Bilateral 6/28/2021     Procedure: Lumbar laminectomy lumbar 3 lumbar 4 for bilateral decompression;  Surgeon: Bernabe Costello MD;  Location: [facility] MAIN OR;  Service: Neurosurgery;  Laterality: Bilateral;   • WISDOM TOOTH EXTRACTION         Family History   Problem Relation Age of Onset   • Arthritis Mother    • Mental illness Mother    • Stroke Father    • No Known Problems Sister    • No Known Problems Brother    • No Known Problems Brother    • No Known Problems Brother    • Malig Hyperthermia Neg Hx        Social History     Socioeconomic History   • Marital status:    Tobacco Use   • Smoking status: Never Smoker   • Smokeless tobacco: Never Used   Vaping Use   • Vaping Use: Never used   Substance and Sexual Activity   • Alcohol use: Yes     Comment: very seldom   • Drug use: Never   • Sexual activity: Defer       Current Outpatient Medications on File Prior to Visit   Medication Sig Dispense Refill   • aspirin 325 MG tablet Take 500 mg by mouth Daily.     • fluticasone (FLONASE) 50 MCG/ACT nasal spray 2 sprays into the nostril(s) as directed by provider Daily.     • ibuprofen (ADVIL,MOTRIN) 200 MG tablet Take 200 mg by mouth Every 6 (Six) Hours As Needed for Mild Pain .     • losartan-hydrochlorothiazide (Hyzaar) 100-25 MG per tablet Take 1 tablet by mouth Daily. 90 tablet 1     No current facility-administered medications on file prior to visit.       Review of Systems   Constitutional: Negative for fever.   HENT: Negative for congestion.    Respiratory: Negative for shortness of breath.    Cardiovascular: Negative for chest pain.   Gastrointestinal: Negative for abdominal pain.   Genitourinary: Negative for decreased urine volume.   Musculoskeletal: Negative for back pain.   Neurological: Negative for weakness.   Psychiatric/Behavioral: Negative for depressed mood.           Vitals:    02/25/22 1514   BP: 130/88   Pulse: 86   Temp: 97.8 °F (36.6 °C)   SpO2: 96%      Objective   Physical Exam  Vitals and nursing note  reviewed.   Constitutional:       Appearance: He is well-developed.   HENT:      Head: Normocephalic.   Cardiovascular:      Rate and Rhythm: Normal rate and regular rhythm.      Heart sounds: Normal heart sounds. No murmur heard.      Pulmonary:      Effort: Pulmonary effort is normal. No respiratory distress.      Breath sounds: Normal breath sounds.   Abdominal:      General: Bowel sounds are normal.      Palpations: Abdomen is soft.   Musculoskeletal:         General: Normal range of motion.      Cervical back: Neck supple.   Skin:     General: Skin is warm and dry.   Neurological:      Mental Status: He is alert and oriented to person, place, and time.           Assessment/Plan   Problems Addressed this Visit     None      Visit Diagnoses     Other hyperlipidemia    -  Primary    Relevant Medications    simvastatin (Zocor) 10 MG tablet    Primary hypertension        Elevated fasting blood sugar          Diagnoses       Codes Comments    Other hyperlipidemia    -  Primary ICD-10-CM: E78.49  ICD-9-CM: 272.4     Primary hypertension     ICD-10-CM: I10  ICD-9-CM: 401.9     Elevated fasting blood sugar     ICD-10-CM: R73.01  ICD-9-CM: 790.21         Stable hypertension  Hyperlipidemia would start statin given ASCVD risk    Recheck liver function and cholesterol in 3 months           Shayna Messina MD

## 2022-03-17 ENCOUNTER — TRANSCRIBE ORDERS (OUTPATIENT)
Dept: SURGERY | Facility: SURGERY CENTER | Age: 57
End: 2022-03-17

## 2022-03-17 ENCOUNTER — OFFICE VISIT (OUTPATIENT)
Dept: PAIN MEDICINE | Facility: CLINIC | Age: 57
End: 2022-03-17

## 2022-03-17 ENCOUNTER — PREP FOR SURGERY (OUTPATIENT)
Dept: SURGERY | Facility: SURGERY CENTER | Age: 57
End: 2022-03-17

## 2022-03-17 VITALS
DIASTOLIC BLOOD PRESSURE: 81 MMHG | HEIGHT: 73 IN | TEMPERATURE: 97.8 F | OXYGEN SATURATION: 98 % | HEART RATE: 96 BPM | RESPIRATION RATE: 16 BRPM | SYSTOLIC BLOOD PRESSURE: 148 MMHG | WEIGHT: 285.6 LBS | BODY MASS INDEX: 37.85 KG/M2

## 2022-03-17 DIAGNOSIS — M47.816 LUMBAR FACET ARTHROPATHY: ICD-10-CM

## 2022-03-17 DIAGNOSIS — M51.36 DDD (DEGENERATIVE DISC DISEASE), LUMBAR: ICD-10-CM

## 2022-03-17 DIAGNOSIS — M54.16 LUMBAR RADICULAR PAIN: ICD-10-CM

## 2022-03-17 DIAGNOSIS — G89.29 OTHER CHRONIC PAIN: Primary | ICD-10-CM

## 2022-03-17 DIAGNOSIS — Z41.9 SURGERY, ELECTIVE: Primary | ICD-10-CM

## 2022-03-17 DIAGNOSIS — M51.36 DDD (DEGENERATIVE DISC DISEASE), LUMBAR: Primary | ICD-10-CM

## 2022-03-17 PROCEDURE — 99214 OFFICE O/P EST MOD 30 MIN: CPT | Performed by: NURSE PRACTITIONER

## 2022-03-17 RX ORDER — SODIUM CHLORIDE 0.9 % (FLUSH) 0.9 %
10 SYRINGE (ML) INJECTION AS NEEDED
Status: CANCELLED | OUTPATIENT
Start: 2022-03-17

## 2022-03-17 RX ORDER — SODIUM CHLORIDE 0.9 % (FLUSH) 0.9 %
10 SYRINGE (ML) INJECTION EVERY 12 HOURS SCHEDULED
Status: CANCELLED | OUTPATIENT
Start: 2022-03-17

## 2022-03-17 NOTE — PROGRESS NOTES
CHIEF COMPLAINT  PROCEDURE FOLLOW UP lumbar epidural steroid injection lumbar4-5 or lumbar5-sacral1.  Reports 80% improvement for about 3-4 days from steroid epidural , then went back to baseline .     Subjective   Wei Salgado is a 56 y.o. male  who presents to the office for follow-up of procedure.  He completed a LESI L5-S1   on  2-17-22 performed by Dr. SHABAZZ for management of LOW BACK PAIN. Patient reports 80% relief from the procedure for 3-4 days.  He also noticed his leg pain felt much stronger for over a week. Noted improvement activity.     Complains of pain in his low back and legs. Today his pain is 5/10VAS.  Describes the pain as intermittent aching in back and a general feeling of weakness in his legs. Pain increases with walking, standing, activity, working, being on feet; pain decreases with sitting, laying, and procedures. Takes TYlenol and ibuprofen OTC. Also takes Armen [employer] and Body, which is what he takes the most of, approximately 2-3 times per week.  ADL's by self. Denies any bowel or bladder incontinence.    Patient was initially evaluated as a new patient by myself on 2/10/2022.  He was referred here by Dr. Costello for low back pain.  Plan was to attempt a lumbar epidural steroid injection either at L4-5 or L5-S1.  Could also consider bilateral SI joint injection in the future.  Plan to follow-up after procedure.    His back pain started in 2020. He was diagnosed with spinal stenosis. Eventually saw Dr Costello. Had surgery in June 2021. Referred to PT post-op.  Reports his leg heaviness was improved after surgery.  However, his back pain persisted. Has tried PT. Never had any lumbar interventions such as epidurals or lumbar facet injections.     Patient remained masked during entire encounter. No cough present. I donned a mask and eye protection throughout entire visit. Prior to donning mask and eye protection, hand hygiene was performed, as well as when it was doffed.  I was closer  than 6 feet, but not for an extended period of time. No obvious exposure to any bodily fluids.    Back Pain  This is a chronic problem. The current episode started more than 1 year ago. The problem occurs constantly. The problem has been gradually worsening (variable since last evaluation) since onset. The pain is present in the lumbar spine. The quality of the pain is described as aching. The pain is at a severity of 5/10. The pain is moderate. The pain is worse during the day. The symptoms are aggravated by bending, position, standing and twisting. Associated symptoms include weakness (bilateral legs). Pertinent negatives include no abdominal pain, bladder incontinence, bowel incontinence, chest pain, dysuria, fever, headaches or numbness.        Narrative & Impression   MRI OF THE LUMBAR SPINE WITH AND WITHOUT CONTRAST 01/23/2022     CLINICAL HISTORY: Patient had lumbar spine surgery on 06/28/2021 with  lumbar laminectomy at L3-L4 for decompression. The patient complains of  left lower extremity pain and weakness.     TECHNIQUE: Sagittal T1, proton density and fat-suppressed T2  postcontrast sagittal T1-weighted images were obtained of the lumbar  spine, in addition, axial T2-weighted images were obtained from T12 to  S1, thin cut pre and postcontrast axial T1-weighted images were obtained  angled through the interspaces from L3 to S1.     COMPARISON: This is correlated to a prior MRI lumbar spine on  05/14/2021.     FINDINGS: The distal thoracic cord and conus is normal in signal  intensity, conus terminates at L1 lumbar level which is normal.     At T12-L1, the disc space and facets are normal with no canal or  foraminal narrowing.     At L1-L2, the posterior disc margin is normal. There is minimal facet  overgrowth.There is no canal or foraminal narrowing.     At L2-L3, there is minimal diffuse posterior disc bulge. Facets are  normal. There is minimal canal narrowing, no foraminal narrowing.     At L3-L4,  since prior outside MRI of lumbar spine 05/14/2021, patient  has had interval surgery at the L3-L4 level on 06/28/2021, during which  there was performance of bilateral laminectomies at L3 and bilateral  medial facetectomies at L3-L4. There remains mild right and  mild-to-moderate left facet overgrowth, some fluid in the facet joints.  There is diffuse posterior disc bulge. There has been resolution of  canal narrowing. There remains slight narrowing of the lateral recesses.  There is some spurring and bulging disc material into the left neural  foramen, mildly narrows the left foramen and may abut the exiting left  L3 nerve root. There is no right foraminal narrowing.     At L4-5, there is mild left facet overgrowth. There is 1-2 mm  retrolisthesis of L3 on L4, and mild diffuse posterior disc osteophyte  complex. There is no canal narrowing. There is perhaps slight narrowing  of the left lateral recess. There is some spurring and bulging disc  material into the foramina and there is mild bilateral foraminal  narrowing.     At L5-S1, there is mild bilateral facet overgrowth. Posterior disc  margin is normal. There is no canal or lateral recess or foraminal  narrowing.     IMPRESSION:  1. Since the prior outside MRI of the lumbar spine on 05/14/2021, the  patient had interval surgery at L3-4 lumbar level on 06/28/2021. There  is been performance of bilateral laminectomies at L3 and bilateral  medial facetectomies at L3-4. There remains mild right and mild/moderate  left facet overgrowth with a small amount of fluid in the facet joints.  There is a diffuse posterior disc bulge. There has been interval  resolution of the canal narrowing at L3-4 but there remains slight  narrowing lateral recesses and some spurring and bulging disc material  into left foramen which mildly narrows the left foramen may abut the  exiting left L3 nerve root.  2. Otherwise, there's been no change when compared to the prior MRI of  the lumbar  spine on 05/14/2021. At L4-5, there is mild bilateral facet  overgrowth and 1 to 2 mm retrolisthesis of L4 and L5 and a mild diffuse  posterior disc osteophyte complex but there is no canal narrowing and  there is perhaps slight narrowing of the left lateral recess. There is  some mild spurring and bulging disc material into the neural foramina  with mild bilateral foraminal narrowing. No additional canal or  foraminal narrowing is seen in lumbar spine.     This report was finalized on 1/24/2022 8:33 AM by Dr. Gutierrez Greene M.D.            PEG Assessment   What number best describes your pain on average in the past week?7  What number best describes how, during the past week, pain has interfered with your enjoyment of life?7  What number best describes how, during the past week, pain has interfered with your general activity?  7    The following portions of the patient's history were reviewed and updated as appropriate: allergies, current medications, past family history, past medical history, past social history, past surgical history and problem list.    Review of Systems   Constitutional: Negative for activity change, fatigue and fever.   HENT: Negative for congestion.    Eyes: Negative for visual disturbance.   Respiratory: Negative for cough and chest tightness.    Cardiovascular: Negative for chest pain.   Gastrointestinal: Negative for abdominal pain, bowel incontinence, constipation and diarrhea.   Genitourinary: Negative for bladder incontinence, difficulty urinating and dysuria.   Musculoskeletal: Positive for back pain.   Neurological: Positive for weakness (bilateral legs). Negative for dizziness, light-headedness, numbness and headaches.   Psychiatric/Behavioral: Negative for agitation, sleep disturbance and suicidal ideas. The patient is not nervous/anxious.      I have reviewed and confirmed the accuracy of the ROS as documented by the MA/PETEY/RN Elle Tobin, APRN       Vitals:    03/17/22 0909  "  BP: 148/81   BP Location: Left arm   Patient Position: Sitting   Pulse: 96   Resp: 16   Temp: 97.8 °F (36.6 °C)   SpO2: 98%   Weight: 130 kg (285 lb 9.6 oz)   Height: 185.4 cm (73\")   PainSc:   5   PainLoc: Back       Objective   Physical Exam  Vitals and nursing note reviewed.   Constitutional:       Appearance: He is well-developed.   HENT:      Head: Normocephalic and atraumatic.   Musculoskeletal:      Lumbar back: Tenderness and bony tenderness (minimal lumbar facet tenderness) present. Decreased range of motion. Positive right straight leg raise test and positive left straight leg raise test.        Back:       Comments: mild tenderness of bilateral SI joints     Neurological:      Mental Status: He is alert.      Gait: Gait abnormal.      Deep Tendon Reflexes:      Reflex Scores:       Patellar reflexes are 1+ on the right side and 1+ on the left side.  Psychiatric:         Speech: Speech normal.         Behavior: Behavior normal.         Thought Content: Thought content normal.         Judgment: Judgment normal.         Assessment/Plan   Diagnoses and all orders for this visit:    1. Other chronic pain (Primary)    2. DDD (degenerative disc disease), lumbar    3. Lumbar facet arthropathy    4. Lumbar radicular pain      --- lumbar epidural steroid injection L5-S1. No blood thinners. Reviewed the procedure at length with the patient.  Included in the review was expectations, complications, risk and benefits.The procedure was described in detail and the risks, benefits and alternatives were discussed with the patient (including but not limited to: bleeding, infection, nerve damage, worsening of pain, inability to perform injection, paralysis, seizures, coma, no pain relief and death) who agreed to proceed.  Discussed the potential for sedation if warranted/wanted.  The procedure will plan to be performed at [facility] with fluoroscopic guidance(unless ultrasound is indicated) and could " potentially have steroids and contrast dye used. Questions were answered and in a way the patient could understand.  Patient verbalized understanding and wishes to proceed.  This intervention will be ordered.  Discussed with patient that all procedures are part of a multimodal plan of care and include either formal PT or a home exercise program.  Patient has no evidence of coagulopathy or current infection.    --- Follow-up after procedure or sooner if needed      ---  Indications for epidural injection:  Plan is to proceed with epidural at the appropriate level.  If the patient receives significant pain reduction and improvement in function and the plan will be to repeat the epidural when the pain worsens.  If a second epidural provides at least 6 weeks of sustained improvement that includes both pain reduction and improvement in function then an epidural injection could be repeated once again at the same level.  This is a mutual decision between the clinician and the patient that includes discussions including risks and benefits in detail as well as alternative therapies.  Patient's questions were answered to their satisfaction and to their understanding.  ---         MER REPORT    As the clinician, I personally reviewed the MER from 3-17-22 while the patient was in the office today.         Dictated utilizing Dragon dictation.      This document is intended for medical expert use only. Reading of this document by patients and/or patient's family without participating medical staff guidance may result in misinterpretation and unintended morbidity.   Any interpretation of such data is the responsibility of the patient and/or family member responsible for the patient in concert with their primary or specialist providers, not to be left for sources of online searches such as [facility], [employer] or similar queries. Relying on these approaches to knowledge may result in misinterpretation, misguided goals of care and  even death should patients or family members try recommendations outside of the realm of professional medical care in a supervised way.

## 2022-03-29 ENCOUNTER — HOSPITAL ENCOUNTER (OUTPATIENT)
Dept: GENERAL RADIOLOGY | Facility: SURGERY CENTER | Age: 57
Setting detail: HOSPITAL OUTPATIENT SURGERY
End: 2022-03-29

## 2022-03-29 ENCOUNTER — HOSPITAL ENCOUNTER (OUTPATIENT)
Facility: SURGERY CENTER | Age: 57
Setting detail: HOSPITAL OUTPATIENT SURGERY
Discharge: HOME | End: 2022-03-29
Attending: ANESTHESIOLOGY | Admitting: ANESTHESIOLOGY

## 2022-03-29 VITALS
HEART RATE: 72 BPM | TEMPERATURE: 97.8 F | OXYGEN SATURATION: 97 % | RESPIRATION RATE: 17 BRPM | SYSTOLIC BLOOD PRESSURE: 128 MMHG | DIASTOLIC BLOOD PRESSURE: 80 MMHG

## 2022-03-29 DIAGNOSIS — M51.36 DDD (DEGENERATIVE DISC DISEASE), LUMBAR: ICD-10-CM

## 2022-03-29 DIAGNOSIS — Z41.9 SURGERY, ELECTIVE: ICD-10-CM

## 2022-03-29 DIAGNOSIS — M54.16 LUMBAR RADICULAR PAIN: ICD-10-CM

## 2022-03-29 PROCEDURE — 77002 NEEDLE LOCALIZATION BY XRAY: CPT

## 2022-03-29 PROCEDURE — 62323 NJX INTERLAMINAR LMBR/SAC: CPT | Performed by: ANESTHESIOLOGY

## 2022-03-29 PROCEDURE — 25010000002 METHYLPREDNISOLONE PER 80 MG: Performed by: ANESTHESIOLOGY

## 2022-03-29 PROCEDURE — 76000 FLUOROSCOPY <1 HR PHYS/QHP: CPT

## 2022-03-29 PROCEDURE — 0 IOHEXOL 300 MG/ML SOLUTION 10 ML VIAL: Performed by: ANESTHESIOLOGY

## 2022-03-29 RX ORDER — SODIUM CHLORIDE 0.9 % (FLUSH) 0.9 %
10 SYRINGE (ML) INJECTION EVERY 12 HOURS SCHEDULED
Status: DISCONTINUED | OUTPATIENT
Start: 2022-03-29 | End: 2022-03-29 | Stop reason: HOSPADM

## 2022-03-29 RX ORDER — SODIUM CHLORIDE 0.9 % (FLUSH) 0.9 %
10 SYRINGE (ML) INJECTION AS NEEDED
Status: DISCONTINUED | OUTPATIENT
Start: 2022-03-29 | End: 2022-03-29 | Stop reason: HOSPADM

## 2022-03-29 RX ORDER — CLARITHROMYCIN 125 MG/5ML
FOR SUSPENSION ORAL 2 TIMES DAILY
COMMUNITY
End: 2022-05-12

## 2022-04-26 ENCOUNTER — OFFICE VISIT (OUTPATIENT)
Dept: PAIN MEDICINE | Facility: CLINIC | Age: 57
End: 2022-04-26

## 2022-04-26 VITALS
RESPIRATION RATE: 20 BRPM | DIASTOLIC BLOOD PRESSURE: 84 MMHG | WEIGHT: 282 LBS | HEIGHT: 73 IN | OXYGEN SATURATION: 96 % | HEART RATE: 62 BPM | SYSTOLIC BLOOD PRESSURE: 124 MMHG | BODY MASS INDEX: 37.37 KG/M2 | TEMPERATURE: 97.1 F

## 2022-04-26 DIAGNOSIS — R29.898 WEAKNESS OF BOTH LOWER EXTREMITIES: ICD-10-CM

## 2022-04-26 DIAGNOSIS — G89.29 OTHER CHRONIC PAIN: Primary | ICD-10-CM

## 2022-04-26 DIAGNOSIS — M54.16 LUMBAR RADICULAR PAIN: ICD-10-CM

## 2022-04-26 DIAGNOSIS — M51.36 DDD (DEGENERATIVE DISC DISEASE), LUMBAR: ICD-10-CM

## 2022-04-26 PROCEDURE — 99213 OFFICE O/P EST LOW 20 MIN: CPT | Performed by: NURSE PRACTITIONER

## 2022-04-26 NOTE — PROGRESS NOTES
CHIEF COMPLAINT  F/u back pain. Pt had lumbar epidural steroid injection lumbar5-sacral1 and sts receiving 90% relief from inj.     Subjective   Wei Salgado is a 56 y.o. male  who presents for follow-up.  He has a history of chronic back pain. Reports this is improved since last evaluation.    Patient presents for follow-up of PROCEDURE. Patient had a LESI L5-S1 performed by Dr. SHABAZZ on 3-25-22 for management of LOW BACK PAIN. Patient reports 90% ONGOING relief from the procedure. Was able to golf for first time in 2 years. Was also able to go fishing for first time in quite some time.    Complains of pain in his low back. Today his pain is 2/10VAS. Reports his back pain is intermittent now. His primary complaint is his leg weakness.  Having bilateral leg weakness. Did notice some mild improvement in weakness since surgery and epidural, but still having some leg weakness. ADL's by self. Denies any bowel or bladder incontinence.     Patient was initially evaluated as a new patient by myself on 2/10/2022.  He was referred here by Dr. Costello for low back pain.  Plan was to attempt a lumbar epidural steroid injection either at L4-5 or L5-S1.  Could also consider bilateral SI joint injection in the future.  Plan to follow-up after procedure.     His back pain started in 2020. He was diagnosed with spinal stenosis. Eventually saw Dr Costello. Had surgery in June 2021. Referred to PT post-op.  Reports his leg heaviness was improved after surgery.  However, his back pain persisted. Has tried PT. Never had any lumbar interventions such as epidurals or lumbar facet injections.   Patient remained masked during entire encounter. No cough present. I donned a mask and eye protection throughout entire visit. Prior to donning mask and eye protection, hand hygiene was performed, as well as when it was doffed.  I was closer than 6 feet, but not for an extended period of time. No obvious exposure to any bodily fluids.    Back  Pain  This is a chronic problem. The current episode started more than 1 year ago. The problem has been gradually worsening (improved since last evaluation) since onset. The pain is present in the lumbar spine. The quality of the pain is described as aching. The pain is at a severity of 2/10. The pain is moderate. The pain is worse during the day. The symptoms are aggravated by bending, position, standing and twisting. Pertinent negatives include no abdominal pain, bladder incontinence, bowel incontinence, chest pain, dysuria, fever, headaches, numbness or weakness.      Procedure List  --3-25-22-- LESI L5-S1-- 90% ONGOING  --2-17-22- LESI L5-S1-- 80% for 3-4 days    Narrative & Impression   MRI OF THE LUMBAR SPINE WITH AND WITHOUT CONTRAST 01/23/2022     CLINICAL HISTORY: Patient had lumbar spine surgery on 06/28/2021 with  lumbar laminectomy at L3-L4 for decompression. The patient complains of  left lower extremity pain and weakness.     TECHNIQUE: Sagittal T1, proton density and fat-suppressed T2  postcontrast sagittal T1-weighted images were obtained of the lumbar  spine, in addition, axial T2-weighted images were obtained from T12 to  S1, thin cut pre and postcontrast axial T1-weighted images were obtained  angled through the interspaces from L3 to S1.     COMPARISON: This is correlated to a prior MRI lumbar spine on  05/14/2021.     FINDINGS: The distal thoracic cord and conus is normal in signal  intensity, conus terminates at L1 lumbar level which is normal.     At T12-L1, the disc space and facets are normal with no canal or  foraminal narrowing.     At L1-L2, the posterior disc margin is normal. There is minimal facet  overgrowth.There is no canal or foraminal narrowing.     At L2-L3, there is minimal diffuse posterior disc bulge. Facets are  normal. There is minimal canal narrowing, no foraminal narrowing.     At L3-L4, since prior outside MRI of lumbar spine 05/14/2021, patient  has had interval surgery  at the L3-L4 level on 06/28/2021, during which  there was performance of bilateral laminectomies at L3 and bilateral  medial facetectomies at L3-L4. There remains mild right and  mild-to-moderate left facet overgrowth, some fluid in the facet joints.  There is diffuse posterior disc bulge. There has been resolution of  canal narrowing. There remains slight narrowing of the lateral recesses.  There is some spurring and bulging disc material into the left neural  foramen, mildly narrows the left foramen and may abut the exiting left  L3 nerve root. There is no right foraminal narrowing.     At L4-5, there is mild left facet overgrowth. There is 1-2 mm  retrolisthesis of L3 on L4, and mild diffuse posterior disc osteophyte  complex. There is no canal narrowing. There is perhaps slight narrowing  of the left lateral recess. There is some spurring and bulging disc  material into the foramina and there is mild bilateral foraminal  narrowing.     At L5-S1, there is mild bilateral facet overgrowth. Posterior disc  margin is normal. There is no canal or lateral recess or foraminal  narrowing.     IMPRESSION:  1. Since the prior outside MRI of the lumbar spine on 05/14/2021, the  patient had interval surgery at L3-4 lumbar level on 06/28/2021. There  is been performance of bilateral laminectomies at L3 and bilateral  medial facetectomies at L3-4. There remains mild right and mild/moderate  left facet overgrowth with a small amount of fluid in the facet joints.  There is a diffuse posterior disc bulge. There has been interval  resolution of the canal narrowing at L3-4 but there remains slight  narrowing lateral recesses and some spurring and bulging disc material  into left foramen which mildly narrows the left foramen may abut the  exiting left L3 nerve root.  2. Otherwise, there's been no change when compared to the prior MRI of  the lumbar spine on 05/14/2021. At L4-5, there is mild bilateral facet  overgrowth and 1 to 2 mm  retrolisthesis of L4 and L5 and a mild diffuse  posterior disc osteophyte complex but there is no canal narrowing and  there is perhaps slight narrowing of the left lateral recess. There is  some mild spurring and bulging disc material into the neural foramina  with mild bilateral foraminal narrowing. No additional canal or  foraminal narrowing is seen in lumbar spine.     This report was finalized on 1/24/2022 8:33 AM by Dr. Gutierrez Greene M.D.            PEG Assessment   What number best describes your pain on average in the past week?2  What number best describes how, during the past week, pain has interfered with your enjoyment of life?2  What number best describes how, during the past week, pain has interfered with your general activity?  2    The following portions of the patient's history were reviewed and updated as appropriate: allergies, current medications, past family history, past medical history, past social history, past surgical history and problem list.    Review of Systems   Constitutional: Positive for activity change (improved). Negative for fatigue and fever.   HENT: Negative for congestion.    Eyes: Negative for visual disturbance.   Respiratory: Negative for cough and shortness of breath.    Cardiovascular: Negative for chest pain.   Gastrointestinal: Negative for abdominal pain, bowel incontinence, constipation and diarrhea.   Genitourinary: Negative for bladder incontinence, difficulty urinating and dysuria.   Musculoskeletal: Positive for back pain.   Neurological: Negative for dizziness, weakness, light-headedness, numbness and headaches.   Hematological: Does not bruise/bleed easily.   Psychiatric/Behavioral: Negative for agitation, sleep disturbance and suicidal ideas. The patient is not nervous/anxious.      I have reviewed and confirmed the accuracy of the ROS as documented by the MA/PETEY/HEATHER Tobin, LEONARD      Vitals:    04/26/22 0833   BP: 124/84   Pulse: 62   Resp: 20   Temp:  "97.1 °F (36.2 °C)   SpO2: 96%   Weight: 128 kg (282 lb)   Height: 185.4 cm (73\")   PainSc:   2   PainLoc: Back     Objective   Physical Exam  Vitals and nursing note reviewed.   Constitutional:       Appearance: He is well-developed.   HENT:      Head: Normocephalic and atraumatic.   Musculoskeletal:      Lumbar back: Tenderness and bony tenderness (minimal lumbar facet tenderness) present. Decreased range of motion. Positive right straight leg raise test and positive left straight leg raise test.        Back:       Comments: mild tenderness of bilateral SI joints     Neurological:      Mental Status: He is alert.      Gait: Gait abnormal.      Deep Tendon Reflexes:      Reflex Scores:       Patellar reflexes are 1+ on the right side and 1+ on the left side.  Psychiatric:         Speech: Speech normal.         Behavior: Behavior normal.         Thought Content: Thought content normal.         Judgment: Judgment normal.         Assessment/Plan   Diagnoses and all orders for this visit:    1. Other chronic pain (Primary)    2. DDD (degenerative disc disease), lumbar    3. Lumbar radicular pain    4. Weakness of both lower extremities  -     EMG & Nerve Conduction Test; Future      --- EMG of BLE's due to leg weakness. Pending results, consider referral to vascular versus neurology.   --- Repeat procedures in future PRN.  --- Follow-up after EMG.           MER REPORT  As the clinician, I personally reviewed the MER from 4-26-22 while the patient was in the office today.           Dictated utilizing Dragon dictation.     This document is intended for medical expert use only. Reading of this document by patients and/or patient's family without participating medical staff guidance may result in misinterpretation and unintended morbidity.   Any interpretation of such data is the responsibility of the patient and/or family member responsible for the patient in concert with their primary or specialist providers, not to " be left for sources of online searches such as [employer], [employer] or similar queries. Relying on these approaches to knowledge may result in misinterpretation, misguided goals of care and even death should patients or family members try recommendations outside of the realm of professional medical care in a supervised way.

## 2022-05-13 ENCOUNTER — OFFICE VISIT (OUTPATIENT)
Dept: FAMILY MEDICINE CLINIC | Facility: CLINIC | Age: 57
End: 2022-05-13

## 2022-05-13 VITALS
WEIGHT: 279 LBS | HEART RATE: 66 BPM | BODY MASS INDEX: 36.98 KG/M2 | DIASTOLIC BLOOD PRESSURE: 80 MMHG | HEIGHT: 73 IN | OXYGEN SATURATION: 99 % | TEMPERATURE: 98.4 F | SYSTOLIC BLOOD PRESSURE: 130 MMHG

## 2022-05-13 DIAGNOSIS — M25.521 RIGHT ELBOW PAIN: ICD-10-CM

## 2022-05-13 DIAGNOSIS — E78.2 MIXED HYPERLIPIDEMIA: ICD-10-CM

## 2022-05-13 DIAGNOSIS — M79.605 PAIN IN BOTH LOWER EXTREMITIES: Primary | ICD-10-CM

## 2022-05-13 DIAGNOSIS — M79.604 PAIN IN BOTH LOWER EXTREMITIES: Primary | ICD-10-CM

## 2022-05-13 DIAGNOSIS — L82.1 SEBORRHEIC KERATOSIS: ICD-10-CM

## 2022-05-13 PROCEDURE — 99214 OFFICE O/P EST MOD 30 MIN: CPT | Performed by: FAMILY MEDICINE

## 2022-05-13 RX ORDER — MELOXICAM 15 MG/1
15 TABLET ORAL DAILY
Qty: 14 TABLET | Refills: 1 | Status: SHIPPED | OUTPATIENT
Start: 2022-05-13 | End: 2022-06-17

## 2022-05-13 NOTE — PROGRESS NOTES
Chief Complaint   Patient presents with   • Skin Problem     Growth on back of left knee-no pain or itching    • Mass     Right elbow, tender x 1 month        Subjective   Wei Salgado is a 56 y.o. male.     History of Present Illness   Answers for HPI/ROS submitted by the patient on 5/12/2022  What is the primary reason for your visit?: Other  Please describe your symptoms.: Bump under the skin at right elbow. Dark growth on top of skin behind left knee.  Have you had these symptoms before?: No  How long have you been having these symptoms?: Greater than 2 weeks    Skin lesion back of the leg consistent with seborrheic keratoses    Chronic issues lower leg feeling of weakness.  History of spinal stenosis.  Also hyperlipidemia.  Has not had any vascular studies/screening. On simvastain    HTN: hyzaar     Elbow swelling: lots of lifting often, heavier objects as well      The following portions of the patient's history were reviewed and updated as appropriate: allergies, current medications, past family history, past medical history, past social history, past surgical history and problem list.      Past Medical History:   Diagnosis Date   • Allergic    • Arthritis    • Headache    • Hyperlipidemia    • Hypertension    • Sleep apnea     USES CPAP        Past Surgical History:   Procedure Laterality Date   • BACK SURGERY     • COLONOSCOPY N/A 11/20/2020    Procedure: COLONOSCOPY INTO CECUM;  Surgeon: Jesse Frey MD;  Location: [facility] ENDOSCOPY;  Service: Gastroenterology;  Laterality: N/A;  PRE: SCREENING  POST: HEMORRHOIDS   • CYSTOSCOPY URETEROSCOPY LASER LITHOTRIPSY Left 9/27/2021    Procedure: LEFT URETEROSCOPY LASER LITHOTRIPSY, STONE BASKET, POSSIBLE STENT;  Surgeon: Jethro Valentin MD;  Location: [facility] OR;  Service: Urology;  Laterality: Left;   • HAND SURGERY  1977   • LUMBAR EPIDURAL INJECTION N/A 2/17/2022    Procedure: lumbar epidural steroid injection lumbar4-5 or lumbar5-sacral1;  Surgeon:  Mitch Acosta MD;  [facility];  Service: Pain Management;  Laterality: N/A;   • LUMBAR EPIDURAL INJECTION N/A 3/29/2022    Procedure: lumbar epidural steroid injection lumbar5-sacral1;  Surgeon: Mitch Acosta MD;  [facility];  Service: Pain Management;  Laterality: N/A;   • LUMBAR LAMINECTOMY DISCECTOMY DECOMPRESSION Bilateral 6/28/2021    Procedure: Lumbar laminectomy lumbar 3 lumbar 4 for bilateral decompression;  Surgeon: Bernabe Costello MD;  Location: [facility] MAIN OR;  Service: Neurosurgery;  Laterality: Bilateral;   • WISDOM TOOTH EXTRACTION         Family History   Problem Relation Age of Onset   • Arthritis Mother    • Mental illness Mother    • Stroke Father    • No Known Problems Sister    • No Known Problems Brother    • No Known Problems Brother    • No Known Problems Brother    • Malig Hyperthermia Neg Hx        Social History     Socioeconomic History   • Marital status:    Tobacco Use   • Smoking status: Never Smoker   • Smokeless tobacco: Never Used   Vaping Use   • Vaping Use: Never used   Substance and Sexual Activity   • Alcohol use: Yes     Comment: very seldom   • Drug use: Never   • Sexual activity: Defer       Current Outpatient Medications on File Prior to Visit   Medication Sig Dispense Refill   • fluticasone (FLONASE) 50 MCG/ACT nasal spray 2 sprays into the nostril(s) as directed by provider Daily.     • ibuprofen (ADVIL,MOTRIN) 200 MG tablet Take 200 mg by mouth Every 6 (Six) Hours As Needed for Mild Pain .     • losartan-hydrochlorothiazide (Hyzaar) 100-25 MG per tablet Take 1 tablet by mouth Daily. 90 tablet 1   • simvastatin (Zocor) 10 MG tablet Take 1 tablet by mouth Every Night. 90 tablet 1     No current facility-administered medications on file prior to visit.       Review of Systems   Constitutional: Negative for fever.   HENT: Negative for congestion.    Respiratory: Negative for shortness of breath.    Cardiovascular: Negative  for chest pain.   Gastrointestinal: Negative for abdominal pain.   Genitourinary: Negative for decreased urine volume.   Neurological: Negative for tremors.   Psychiatric/Behavioral: Negative for depressed mood.           Vitals:    05/13/22 0914   BP: 130/80   Pulse: 66   Temp: 98.4 °F (36.9 °C)   SpO2: 99%      Objective   Physical Exam  Vitals reviewed.   HENT:      Nose: Nose normal.   Cardiovascular:      Pulses: Normal pulses.      Heart sounds: Normal heart sounds.   Pulmonary:      Effort: Pulmonary effort is normal.   Abdominal:      General: Abdomen is flat.   Skin:     General: Skin is warm.      Capillary Refill: Capillary refill takes less than 2 seconds.   Neurological:      General: No focal deficit present.      Mental Status: He is alert.     Pedal pulses palpable on exam skin normal on foot exam      Assessment & Plan   Problems Addressed this Visit    None     Visit Diagnoses     Pain in both lower extremities    -  Primary    Relevant Orders    US arterial doppler lower extremity  bilateral w wo stress    Right elbow pain        Relevant Medications    meloxicam (Mobic) 15 MG tablet    Seborrheic keratosis        Mixed hyperlipidemia          Diagnoses       Codes Comments    Pain in both lower extremities    -  Primary ICD-10-CM: M79.604, M79.605  ICD-9-CM: 729.5     Right elbow pain     ICD-10-CM: M25.521  ICD-9-CM: 719.42     Seborrheic keratosis     ICD-10-CM: L82.1  ICD-9-CM: 702.19     Mixed hyperlipidemia     ICD-10-CM: E78.2  ICD-9-CM: 272.2       Mobic, ice, rest from lifing for his elbow/tendontitis   HLD on statin recheck labs in 3 weeks, obtain arterial stress dopplers  Awaiting EMG studies for peripheral neuropathy evaul          Follow-up for recheck labs for cholesterol followed by physical      Shayna Messina MD

## 2022-05-25 ENCOUNTER — HOSPITAL ENCOUNTER (OUTPATIENT)
Dept: ULTRASOUND IMAGING | Facility: HOSPITAL | Age: 57
Discharge: HOME | End: 2022-05-25
Admitting: FAMILY MEDICINE

## 2022-05-25 DIAGNOSIS — M79.604 PAIN IN BOTH LOWER EXTREMITIES: ICD-10-CM

## 2022-05-25 DIAGNOSIS — M79.605 PAIN IN BOTH LOWER EXTREMITIES: ICD-10-CM

## 2022-05-25 PROCEDURE — 93924 LWR XTR VASC STDY BILAT: CPT

## 2022-06-08 DIAGNOSIS — M79.605 PAIN IN BOTH LOWER EXTREMITIES: ICD-10-CM

## 2022-06-08 DIAGNOSIS — R29.898 WEAKNESS OF BOTH LOWER EXTREMITIES: ICD-10-CM

## 2022-06-08 DIAGNOSIS — M79.604 PAIN IN BOTH LOWER EXTREMITIES: ICD-10-CM

## 2022-06-08 DIAGNOSIS — M62.81 MUSCLE WEAKNESS: Primary | ICD-10-CM

## 2022-06-08 DIAGNOSIS — M54.16 LUMBAR RADICULOPATHY: Primary | ICD-10-CM

## 2022-06-13 ENCOUNTER — TELEPHONE (OUTPATIENT)
Dept: FAMILY MEDICINE CLINIC | Facility: CLINIC | Age: 57
End: 2022-06-13

## 2022-06-13 LAB
ANA SER QL: NEGATIVE
BUN SERPL-MCNC: 16 MG/DL (ref 6–24)
BUN/CREAT SERPL: 18 (ref 9–20)
CALCIUM SERPL-MCNC: 9.3 MG/DL (ref 8.7–10.2)
CHLORIDE SERPL-SCNC: 103 MMOL/L (ref 96–106)
CK SERPL-CCNC: 585 U/L (ref 41–331)
CO2 SERPL-SCNC: 24 MMOL/L (ref 20–29)
CREAT SERPL-MCNC: 0.89 MG/DL (ref 0.76–1.27)
CRP SERPL-MCNC: 2 MG/L (ref 0–10)
EGFRCR SERPLBLD CKD-EPI 2021: 101 ML/MIN/1.73
ERYTHROCYTE [SEDIMENTATION RATE] IN BLOOD BY WESTERGREN METHOD: 7 MM/HR (ref 0–30)
GLUCOSE SERPL-MCNC: 102 MG/DL (ref 65–99)
HBA1C MFR BLD: 5.6 % (ref 4.8–5.6)
POTASSIUM SERPL-SCNC: 3.7 MMOL/L (ref 3.5–5.2)
SODIUM SERPL-SCNC: 142 MMOL/L (ref 134–144)

## 2022-06-17 ENCOUNTER — OFFICE VISIT (OUTPATIENT)
Dept: FAMILY MEDICINE CLINIC | Facility: CLINIC | Age: 57
End: 2022-06-17

## 2022-06-17 VITALS
WEIGHT: 282 LBS | DIASTOLIC BLOOD PRESSURE: 76 MMHG | BODY MASS INDEX: 37.37 KG/M2 | HEIGHT: 73 IN | TEMPERATURE: 97.5 F | OXYGEN SATURATION: 99 % | HEART RATE: 76 BPM | SYSTOLIC BLOOD PRESSURE: 130 MMHG

## 2022-06-17 DIAGNOSIS — R29.898 WEAKNESS OF BOTH LOWER EXTREMITIES: ICD-10-CM

## 2022-06-17 DIAGNOSIS — G62.89 OTHER POLYNEUROPATHY: ICD-10-CM

## 2022-06-17 DIAGNOSIS — R29.898 LEG WEAKNESS, BILATERAL: Primary | ICD-10-CM

## 2022-06-17 DIAGNOSIS — I10 ESSENTIAL HYPERTENSION: ICD-10-CM

## 2022-06-17 PROCEDURE — 99214 OFFICE O/P EST MOD 30 MIN: CPT | Performed by: FAMILY MEDICINE

## 2022-06-17 RX ORDER — PREDNISONE 10 MG/1
TABLET ORAL
Qty: 48 TABLET | Refills: 0 | Status: SHIPPED | OUTPATIENT
Start: 2022-06-17 | End: 2022-07-05

## 2022-06-17 RX ORDER — LOSARTAN POTASSIUM AND HYDROCHLOROTHIAZIDE 25; 100 MG/1; MG/1
1 TABLET ORAL DAILY
Qty: 90 TABLET | Refills: 1 | Status: SHIPPED | OUTPATIENT
Start: 2022-06-17 | End: 2022-12-16 | Stop reason: SDUPTHER

## 2022-06-17 NOTE — PROGRESS NOTES
Chief Complaint   Patient presents with   • Hyperlipidemia       Subjective   Wei Salgado is a 56 y.o. male.     History of Present Illness   Leg weakness, chronic, f/u    Arterial Doppler studies unremarkable.  EMG pending. Had initial improvement after spinal stenosis symptoms then with lower leg heaviness feelings and tingling BL LE.  CK was elevated was started on statin but stopping stating has not improved symptoms at all. Inflamm markers neg      The following portions of the patient's history were reviewed and updated as appropriate: allergies, current medications, past family history, past medical history, past social history, past surgical history and problem list.      Past Medical History:   Diagnosis Date   • Allergic    • Arthritis    • Headache    • Hyperlipidemia    • Hypertension    • Sleep apnea     USES CPAP        Past Surgical History:   Procedure Laterality Date   • BACK SURGERY     • COLONOSCOPY N/A 11/20/2020    Procedure: COLONOSCOPY INTO CECUM;  Surgeon: Jesse Frey MD;  Location: [facility] ENDOSCOPY;  Service: Gastroenterology;  Laterality: N/A;  PRE: SCREENING  POST: HEMORRHOIDS   • CYSTOSCOPY URETEROSCOPY LASER LITHOTRIPSY Left 9/27/2021    Procedure: LEFT URETEROSCOPY LASER LITHOTRIPSY, STONE BASKET, POSSIBLE STENT;  Surgeon: Jethro Valentin MD;  Location: [facility] OR;  Service: Urology;  Laterality: Left;   • HAND SURGERY  1977   • LUMBAR EPIDURAL INJECTION N/A 2/17/2022    Procedure: lumbar epidural steroid injection lumbar4-5 or lumbar5-sacral1;  Surgeon: Mitch Acosta MD;  Location: [facility] OR;  Service: Pain Management;  Laterality: N/A;   • LUMBAR EPIDURAL INJECTION N/A 3/29/2022    Procedure: lumbar epidural steroid injection lumbar5-sacral1;  Surgeon: Mitch Acosta MD;  [place] [facility] [place];  Service: Pain Management;  Laterality: N/A;   • LUMBAR LAMINECTOMY DISCECTOMY DECOMPRESSION Bilateral 6/28/2021    Procedure: Lumbar laminectomy lumbar 3  lumbar 4 for bilateral decompression;  Surgeon: Bernabe Costello MD;  Location: [facility] MAIN OR;  Service: Neurosurgery;  Laterality: Bilateral;   • WISDOM TOOTH EXTRACTION         Family History   Problem Relation Age of Onset   • Arthritis Mother    • Mental illness Mother    • Stroke Father    • No Known Problems Sister    • No Known Problems Brother    • No Known Problems Brother    • No Known Problems Brother    • Malig Hyperthermia Neg Hx        Social History     Socioeconomic History   • Marital status:    Tobacco Use   • Smoking status: Never Smoker   • Smokeless tobacco: Never Used   Vaping Use   • Vaping Use: Never used   Substance and Sexual Activity   • Alcohol use: Yes     Comment: very seldom   • Drug use: Never   • Sexual activity: Defer       Current Outpatient Medications on File Prior to Visit   Medication Sig Dispense Refill   • fluticasone (FLONASE) 50 MCG/ACT nasal spray 2 sprays into the nostril(s) as directed by provider Daily.       No current facility-administered medications on file prior to visit.       Review of Systems   Constitutional: Negative for activity change, chills and fever.   HENT: Negative for postnasal drip and rhinorrhea.    Eyes: Negative for blurred vision.   Respiratory: Negative for cough and shortness of breath.    Cardiovascular: Negative for chest pain.   Gastrointestinal: Negative for abdominal pain, constipation and diarrhea.   Genitourinary: Negative for decreased urine volume, dysuria and frequency.   Musculoskeletal: Positive for back pain and gait problem. Negative for arthralgias and myalgias.   Skin: Negative for rash and skin lesions.   Neurological: Negative for dizziness and confusion.   Psychiatric/Behavioral: Negative for depressed mood.           Vitals:    06/17/22 1431   BP: 130/76   Pulse: 76   Temp: 97.5 °F (36.4 °C)   SpO2: 99%      Objective   Physical Exam  Vitals and nursing note reviewed.   Constitutional:       Appearance: He is  well-developed.   HENT:      Head: Normocephalic.   Eyes:      Conjunctiva/sclera: Conjunctivae normal.      Pupils: Pupils are equal, round, and reactive to light.   Cardiovascular:      Rate and Rhythm: Normal rate and regular rhythm.      Heart sounds: Normal heart sounds. No murmur heard.  Pulmonary:      Effort: Pulmonary effort is normal. No respiratory distress.      Breath sounds: Normal breath sounds.   Abdominal:      General: Bowel sounds are normal.      Palpations: Abdomen is soft.   Skin:     General: Skin is warm.   Neurological:      Mental Status: He is alert and oriented to person, place, and time.       BL patellar reflexes intact, OK distal extremity strength, able to ambulate. Decreased sensation BL feet    Assessment & Plan   Problems Addressed this Visit    None     Visit Diagnoses     Leg weakness, bilateral    -  Primary    Relevant Orders    Ambulatory Referral to Neurology    Other polyneuropathy        Relevant Medications    predniSONE (DELTASONE) 10 MG tablet    Other Relevant Orders    Ambulatory Referral to Neurology    Weakness of both lower extremities        Relevant Orders    Ambulatory Referral to Neurology    Essential hypertension        Relevant Medications    losartan-hydrochlorothiazide (Hyzaar) 100-25 MG per tablet      Diagnoses       Codes Comments    Leg weakness, bilateral    -  Primary ICD-10-CM: R29.898  ICD-9-CM: 729.89     Other polyneuropathy     ICD-10-CM: G62.89  ICD-9-CM: 357.89     Weakness of both lower extremities     ICD-10-CM: R29.898  ICD-9-CM: 729.89     Essential hypertension     ICD-10-CM: I10  ICD-9-CM: 401.9         Stop statin  Neuro referral  EMG studies pending           Shayna Messina MD

## 2022-06-20 ENCOUNTER — TELEPHONE (OUTPATIENT)
Dept: FAMILY MEDICINE CLINIC | Facility: CLINIC | Age: 57
End: 2022-06-20

## 2022-06-20 DIAGNOSIS — R29.898 LEG WEAKNESS, BILATERAL: Primary | ICD-10-CM

## 2022-06-20 DIAGNOSIS — R29.898 LEG WEAKNESS, BILATERAL: ICD-10-CM

## 2022-06-20 DIAGNOSIS — G60.9 IDIOPATHIC PERIPHERAL NEUROPATHY: Primary | ICD-10-CM

## 2022-06-20 DIAGNOSIS — M54.16 LUMBAR RADICULOPATHY: ICD-10-CM

## 2022-06-21 ENCOUNTER — LAB (OUTPATIENT)
Dept: LAB | Facility: HOSPITAL | Age: 57
End: 2022-06-21

## 2022-06-21 PROCEDURE — 82595 ASSAY OF CRYOGLOBULIN: CPT | Performed by: FAMILY MEDICINE

## 2022-06-21 PROCEDURE — 83655 ASSAY OF LEAD: CPT | Performed by: FAMILY MEDICINE

## 2022-06-21 PROCEDURE — 82784 ASSAY IGA/IGD/IGG/IGM EACH: CPT | Performed by: FAMILY MEDICINE

## 2022-06-21 PROCEDURE — 83825 ASSAY OF MERCURY: CPT | Performed by: FAMILY MEDICINE

## 2022-06-21 PROCEDURE — 82175 ASSAY OF ARSENIC: CPT | Performed by: FAMILY MEDICINE

## 2022-06-21 PROCEDURE — 86592 SYPHILIS TEST NON-TREP QUAL: CPT | Performed by: FAMILY MEDICINE

## 2022-06-21 PROCEDURE — 84155 ASSAY OF PROTEIN SERUM: CPT | Performed by: FAMILY MEDICINE

## 2022-06-21 PROCEDURE — 36415 COLL VENOUS BLD VENIPUNCTURE: CPT | Performed by: FAMILY MEDICINE

## 2022-06-21 PROCEDURE — 84165 PROTEIN E-PHORESIS SERUM: CPT | Performed by: FAMILY MEDICINE

## 2022-06-21 PROCEDURE — 86334 IMMUNOFIX E-PHORESIS SERUM: CPT | Performed by: FAMILY MEDICINE

## 2022-06-22 LAB
ALBUMIN SERPL ELPH-MCNC: 4 G/DL (ref 2.9–4.4)
ALBUMIN/GLOB SERPL: 1.4 {RATIO} (ref 0.7–1.7)
ALPHA1 GLOB SERPL ELPH-MCNC: 0.2 G/DL (ref 0–0.4)
ALPHA2 GLOB SERPL ELPH-MCNC: 0.7 G/DL (ref 0.4–1)
B-GLOBULIN SERPL ELPH-MCNC: 1.1 G/DL (ref 0.7–1.3)
GAMMA GLOB SERPL ELPH-MCNC: 1 G/DL (ref 0.4–1.8)
GLOBULIN SER CALC-MCNC: 2.9 G/DL (ref 2.2–3.9)
IGA SERPL-MCNC: 238 MG/DL (ref 90–386)
IGG SERPL-MCNC: 998 MG/DL (ref 603–1613)
IGM SERPL-MCNC: 70 MG/DL (ref 20–172)
LABORATORY COMMENT REPORT: NORMAL
M PROTEIN SERPL ELPH-MCNC: NORMAL G/DL
PROT PATTERN SERPL ELPH-IMP: NORMAL
PROT PATTERN SERPL IFE-IMP: NORMAL
PROT SERPL-MCNC: 6.9 G/DL (ref 6–8.5)
RPR SER QL: NORMAL

## 2022-06-24 ENCOUNTER — TELEPHONE (OUTPATIENT)
Dept: FAMILY MEDICINE CLINIC | Facility: CLINIC | Age: 57
End: 2022-06-24

## 2022-06-24 DIAGNOSIS — U07.1 COVID-19 VIRUS INFECTION: Primary | ICD-10-CM

## 2022-06-24 NOTE — TELEPHONE ENCOUNTER
Caller: Wei Salgado    Relationship to patient: Self    Best call back number: [phone]    Date of positive COVID19 test: 6/24/22 (HOME TEST, WIFE POSITIVE ON 6/21/22)    Date if possible COVID19 exposure: 6/21/22    COVID19 symptoms: SINUS DRAINAGE, CONGESTION, COUGH, TIRED, AND BODY ACHES    Date of initial quarantine: 6/24/22      What is the patients preferred pharmacy: [facility] [address] [place] [address] PK[place] - [phone] [facility] [phone] FX    PLEASE CALL AND ADVISE   Detail Level: Generalized General Sunscreen Counseling: Patient will use sunscreen lotion and protection unexposed areas during the leyla months of the year if patient might be outside.  Patient should use an SPF #30 or higher sunscreen product.

## 2022-06-24 NOTE — TELEPHONE ENCOUNTER
I can send in an antiviral for him to try.  Called Paxlovid.  Can have side effects of nausea.  It is to be taken within 5 days of symptom onset.  The goal is to prevent symptoms from turning into something more severe.    He can schedule a MyChart video visit to discuss further I sent a prescription to his pharmacy

## 2022-06-25 LAB
ARSENIC BLD-MCNC: 2 UG/L (ref 0–9)
LEAD BLDV-MCNC: <1 UG/DL (ref 0–4)
MERCURY BLD-MCNC: <1 UG/L (ref 0–14.9)

## 2022-06-27 LAB — CRYOGLOB SER QL 1D COLD INC: NORMAL

## 2022-07-07 NOTE — PROGRESS NOTES
Chief Complaint   Patient presents with   • Hypertension   • Results       Subjective   Wei Salgado is a 57 y.o. male.     History of Present Illness   Emg results     He was diagnosed with lumbar spinal stenosis. Saw ana Dr Costello. Had surgery in June 2021. Referred to PT post-op.  Goes to pain management, Reports his leg heaviness was improved after surgery but then returned and over past 2-3 months is worsening     EMG shows mod/severe sub acute to chronic demyelinating and axonal sensorimotor polyneurpathy BL distal lower extremities     No alcohol intake at all, no meds other than those prescribed. Negative inflammatory work up. Mild elevated CK with statin but no improvement in symptoms after stopping statin. No macrocytosis or history of B12 deficiency or thyroid issues. No dietary restrictions contributing to low B12. No FH of any autoimmune issues      The following portions of the patient's history were reviewed and updated as appropriate: allergies, current medications, past family history, past medical history, past social history, past surgical history and problem list.      Past Medical History:   Diagnosis Date   • Allergic    • Arthritis    • Headache    • Hyperlipidemia    • Hypertension    • Sleep apnea     USES CPAP        Past Surgical History:   Procedure Laterality Date   • BACK SURGERY     • COLONOSCOPY N/A 11/20/2020    Procedure: COLONOSCOPY INTO CECUM;  Surgeon: Jesse Frey MD;  Location: [facility] ENDOSCOPY;  Service: Gastroenterology;  Laterality: N/A;  PRE: SCREENING  POST: HEMORRHOIDS   • CYSTOSCOPY URETEROSCOPY LASER LITHOTRIPSY Left 9/27/2021    Procedure: LEFT URETEROSCOPY LASER LITHOTRIPSY, STONE BASKET, POSSIBLE STENT;  Surgeon: Jethro Valentin MD;  Location: [facility];  Service: Urology;  Laterality: Left;   • HAND SURGERY  1977   • LUMBAR EPIDURAL INJECTION N/A 2/17/2022    Procedure: lumbar epidural steroid injection lumbar4-5 or lumbar5-sacral1;  Surgeon:  Mitch Acosta MD;  Location: [facility] [place] MAIN OR;  Service: Pain Management;  Laterality: N/A;   • LUMBAR EPIDURAL INJECTION N/A 3/29/2022    Procedure: lumbar epidural steroid injection lumbar5-sacral1;  Surgeon: Mitch Acosta MD;  Location: [facility] [place] MAIN OR;  Service: Pain Management;  Laterality: N/A;   • LUMBAR LAMINECTOMY DISCECTOMY DECOMPRESSION Bilateral 6/28/2021    Procedure: Lumbar laminectomy lumbar 3 lumbar 4 for bilateral decompression;  Surgeon: Bernabe Costello MD;  Location: [facility] MAIN OR;  Service: Neurosurgery;  Laterality: Bilateral;   • WISDOM TOOTH EXTRACTION         Family History   Problem Relation Age of Onset   • Arthritis Mother    • Mental illness Mother    • Stroke Father    • No Known Problems Sister    • No Known Problems Brother    • No Known Problems Brother    • No Known Problems Brother    • Malig Hyperthermia Neg Hx        Social History     Socioeconomic History   • Marital status:    Tobacco Use   • Smoking status: Never Smoker   • Smokeless tobacco: Never Used   Vaping Use   • Vaping Use: Never used   Substance and Sexual Activity   • Alcohol use: Yes     Comment: very seldom   • Drug use: Never   • Sexual activity: Defer       Current Outpatient Medications on File Prior to Visit   Medication Sig Dispense Refill   • fluticasone (FLONASE) 50 MCG/ACT nasal spray 2 sprays into the nostril(s) as directed by provider Daily.     • losartan-hydrochlorothiazide (Hyzaar) 100-25 MG per tablet Take 1 tablet by mouth Daily. 90 tablet 1     No current facility-administered medications on file prior to visit.       Review of Systems   Constitutional: Negative for fever.   HENT: Negative for congestion.    Respiratory: Negative for shortness of breath.    Cardiovascular: Negative for chest pain.   Gastrointestinal: Negative for abdominal pain.   Genitourinary: Negative for urgency.   Musculoskeletal: Positive for back pain.   Neurological: Positive for weakness.    Psychiatric/Behavioral: Negative for depressed mood.           Vitals:    07/08/22 0957   BP: (!) 160/102   Pulse: 68   Temp: 98.2 °F (36.8 °C)   SpO2: 98%    BP on recheck was normotensive    Objective   Physical Exam  Vitals reviewed.   Eyes:      Pupils: Pupils are equal, round, and reactive to light.   Cardiovascular:      Rate and Rhythm: Normal rate and regular rhythm.      Pulses: Normal pulses.   Pulmonary:      Effort: Pulmonary effort is normal.   Abdominal:      General: Abdomen is flat.   Musculoskeletal:         General: Normal range of motion.   Skin:     General: Skin is warm.      Capillary Refill: Capillary refill takes less than 2 seconds.   Neurological:      Mental Status: He is alert.      Sensory: Sensory deficit present.      Motor: Weakness present.           Assessment & Plan   Problems Addressed this Visit    None     Visit Diagnoses     Idiopathic peripheral neuropathy    -  Primary    Relevant Orders    Vitamin B12    Folate    TSH Rfx On Abnormal To Free T4    CBC w AUTO Differential      Diagnoses       Codes Comments    Idiopathic peripheral neuropathy    -  Primary ICD-10-CM: G60.9  ICD-9-CM: 356.9           Would want Neurology assistance in interpretation and further work up for EMG results and idopathic peripheral neuropathy with distal LE muscle weakness and progressive neuropathy symptoms            Shayna Messina MD

## 2022-07-08 ENCOUNTER — OFFICE VISIT (OUTPATIENT)
Dept: FAMILY MEDICINE CLINIC | Facility: CLINIC | Age: 57
End: 2022-07-08

## 2022-07-08 VITALS
TEMPERATURE: 98.2 F | BODY MASS INDEX: 37.91 KG/M2 | WEIGHT: 286 LBS | SYSTOLIC BLOOD PRESSURE: 160 MMHG | HEART RATE: 68 BPM | HEIGHT: 73 IN | OXYGEN SATURATION: 98 % | DIASTOLIC BLOOD PRESSURE: 102 MMHG

## 2022-07-08 DIAGNOSIS — G60.9 IDIOPATHIC PERIPHERAL NEUROPATHY: Primary | ICD-10-CM

## 2022-07-08 PROCEDURE — 99213 OFFICE O/P EST LOW 20 MIN: CPT | Performed by: FAMILY MEDICINE

## 2022-07-09 DIAGNOSIS — E53.8 VITAMIN B 12 DEFICIENCY: Primary | ICD-10-CM

## 2022-07-09 LAB
BASOPHILS # BLD AUTO: 0.1 X10E3/UL (ref 0–0.2)
BASOPHILS NFR BLD AUTO: 1 %
EOSINOPHIL # BLD AUTO: 0.2 X10E3/UL (ref 0–0.4)
EOSINOPHIL NFR BLD AUTO: 3 %
ERYTHROCYTE [DISTWIDTH] IN BLOOD BY AUTOMATED COUNT: 12.8 % (ref 11.6–15.4)
FOLATE SERPL-MCNC: 15.2 NG/ML
HCT VFR BLD AUTO: 41.2 % (ref 37.5–51)
HGB BLD-MCNC: 13.9 G/DL (ref 13–17.7)
IMM GRANULOCYTES # BLD AUTO: 0 X10E3/UL (ref 0–0.1)
IMM GRANULOCYTES NFR BLD AUTO: 0 %
LYMPHOCYTES # BLD AUTO: 2.4 X10E3/UL (ref 0.7–3.1)
LYMPHOCYTES NFR BLD AUTO: 39 %
MCH RBC QN AUTO: 30.2 PG (ref 26.6–33)
MCHC RBC AUTO-ENTMCNC: 33.7 G/DL (ref 31.5–35.7)
MCV RBC AUTO: 89 FL (ref 79–97)
MONOCYTES # BLD AUTO: 0.5 X10E3/UL (ref 0.1–0.9)
MONOCYTES NFR BLD AUTO: 9 %
NEUTROPHILS # BLD AUTO: 3 X10E3/UL (ref 1.4–7)
NEUTROPHILS NFR BLD AUTO: 48 %
PLATELET # BLD AUTO: 234 X10E3/UL (ref 150–450)
RBC # BLD AUTO: 4.61 X10E6/UL (ref 4.14–5.8)
TSH SERPL DL<=0.005 MIU/L-ACNC: 1.29 UIU/ML (ref 0.45–4.5)
VIT B12 SERPL-MCNC: 421 PG/ML (ref 232–1245)
WBC # BLD AUTO: 6.1 X10E3/UL (ref 3.4–10.8)

## 2022-07-09 RX ORDER — LANOLIN ALCOHOL/MO/W.PET/CERES
1000 CREAM (GRAM) TOPICAL DAILY
Qty: 30 TABLET | Refills: 5 | Status: SHIPPED | OUTPATIENT
Start: 2022-07-09

## 2022-07-11 ENCOUNTER — TELEPHONE (OUTPATIENT)
Dept: FAMILY MEDICINE CLINIC | Facility: CLINIC | Age: 57
End: 2022-07-11

## 2022-07-11 DIAGNOSIS — G62.89 MULTIFOCAL MOTOR SENSORY DEMYELINATING NEUROPATHY: Primary | ICD-10-CM

## 2022-07-11 NOTE — TELEPHONE ENCOUNTER
Received call today from [facility] requesting that we place a referral for there office. Wei has an appointment on 08/11/22 with Dr. Glenna Culver.

## 2022-07-12 DIAGNOSIS — M53.86 SCIATICA ASSOCIATED WITH DISORDER OF LUMBAR SPINE: Primary | ICD-10-CM

## 2022-07-12 RX ORDER — PREDNISONE 10 MG/1
TABLET ORAL
Qty: 35 TABLET | Refills: 0 | Status: SHIPPED | OUTPATIENT
Start: 2022-07-12 | End: 2022-07-27

## 2022-08-12 ENCOUNTER — TELEPHONE (OUTPATIENT)
Dept: NEUROLOGY | Facility: CLINIC | Age: 57
End: 2022-08-12

## 2022-08-15 ENCOUNTER — APPOINTMENT (OUTPATIENT)
Dept: INFUSION THERAPY | Facility: HOSPITAL | Age: 57
End: 2022-08-15

## 2022-10-11 ENCOUNTER — TELEPHONE (OUTPATIENT)
Dept: PAIN MEDICINE | Facility: CLINIC | Age: 57
End: 2022-10-11

## 2022-10-11 ENCOUNTER — OFFICE VISIT (OUTPATIENT)
Dept: PAIN MEDICINE | Facility: CLINIC | Age: 57
End: 2022-10-11

## 2022-10-11 VITALS
TEMPERATURE: 98.2 F | RESPIRATION RATE: 18 BRPM | BODY MASS INDEX: 38.3 KG/M2 | WEIGHT: 289 LBS | HEIGHT: 73 IN | DIASTOLIC BLOOD PRESSURE: 78 MMHG | HEART RATE: 97 BPM | SYSTOLIC BLOOD PRESSURE: 128 MMHG | OXYGEN SATURATION: 97 %

## 2022-10-11 DIAGNOSIS — M47.816 LUMBAR FACET ARTHROPATHY: ICD-10-CM

## 2022-10-11 DIAGNOSIS — M51.36 DDD (DEGENERATIVE DISC DISEASE), LUMBAR: ICD-10-CM

## 2022-10-11 DIAGNOSIS — M54.16 LUMBAR RADICULAR PAIN: ICD-10-CM

## 2022-10-11 DIAGNOSIS — G89.29 OTHER CHRONIC PAIN: Primary | ICD-10-CM

## 2022-10-11 DIAGNOSIS — R29.898 WEAKNESS OF BOTH LOWER EXTREMITIES: ICD-10-CM

## 2022-10-11 PROCEDURE — 99215 OFFICE O/P EST HI 40 MIN: CPT

## 2022-10-11 RX ORDER — CYCLOBENZAPRINE HCL 10 MG
10 TABLET ORAL 2 TIMES DAILY PRN
Qty: 60 TABLET | Refills: 0 | Status: SHIPPED | OUTPATIENT
Start: 2022-10-11 | End: 2022-11-22

## 2022-10-11 RX ORDER — MELOXICAM 15 MG/1
15 TABLET ORAL DAILY
Qty: 60 TABLET | Refills: 0 | Status: SHIPPED | OUTPATIENT
Start: 2022-10-11 | End: 2022-12-27 | Stop reason: SDUPTHER

## 2022-10-11 RX ORDER — GABAPENTIN 300 MG/1
300 CAPSULE ORAL 3 TIMES DAILY
Qty: 90 CAPSULE | Refills: 0 | Status: SHIPPED | OUTPATIENT
Start: 2022-10-11 | End: 2022-11-14 | Stop reason: SDUPTHER

## 2022-10-11 RX ORDER — DULOXETIN HYDROCHLORIDE 30 MG/1
60 CAPSULE, DELAYED RELEASE ORAL DAILY
Qty: 60 CAPSULE | Refills: 11 | COMMUNITY
Start: 2022-08-10 | End: 2022-11-22

## 2022-10-11 NOTE — TELEPHONE ENCOUNTER
Provider: FRANCISCA  Caller: CAITLYN  Phone Number:[phone]  Reason for Call: PT STATES MS ESPINOSA MADE REFERENCE TO A TYPE OF IMAGING IN HIS APPT HE WAS UNFAMILIAR WITH AND WANTED A CALL BACK TO SEE WHAT THEY WAS.

## 2022-10-11 NOTE — PROGRESS NOTES
"CHIEF COMPLAINT  Back pain and leg pain    Subjective   Wei Salgado is a 57 y.o. male  who presents for follow-up.  He has a history of chronic low back and leg pain. He reports that his leg pain has worsened since his last office visit.     Today pain is 3/10VAS in severity. Pain is located in his low back and bilateral legs. Leg pain is worse than back pain. Describes this pain as a nearly continuous pain. He reports that they feel \"heavy, tired, and weak\". Pain is worsened by prolonged standing, walking long distances, and sitting. 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  Pain improves with rest/reposition. He has tried Armen Aspirin and Ibuprofen which has only offered minimal relief. He has taken Flexeril and Mobic in the past and feels that they were \"somewhat\" helpful. He continues with PT twice a week. He reports this is helpful while there but leg pain returns shortly after leaving.    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 Patient is being followed by Dr. Hancock with Neurology at [facility]. An EMG completed in September 2022 that was ordered by her showed the same findings. She thinks patient would benefit from IVIG infusions but this has been denied twice by his insurance. She is working on getting this approved as his symptoms are worsening.     Patient has also seen Dr. Costello in the past for back and leg pain. At follow up appointment on 2/1/22, Dr. Costello stated that in order to address his facet arthropathy, he would need to consider complete removal of the facet and spinal fusion but at this time felt he could not justify doing so. He did not feel that this approach would help his chronic low back pain and referred him back to pain management for interventional options.    Patient reports that he has had 2 epidurals in the past but pain relief was only " temporary. He is not interested in repeating them at this time.     Procedures:  3/25/22 - L5-S1 LESI - 90% relief x 2-3 weeks  2/17/22 - L5-S1 LESI - 80% relief for 3-4 days    Surgical History:   Decompressive laminectomy at L3-L4 - June 2021    Back Pain  This is a chronic problem. The current episode started more than 1 year ago. The problem occurs intermittently. The problem has been gradually worsening since onset. The pain is present in the lumbar spine. The pain is at a severity of 3/10. The symptoms are aggravated by standing, bending, position and sitting (working on ground). Associated symptoms include leg pain, numbness, tingling and weakness (bilateral legs). Pertinent negatives include no chest pain. (Muscle spams) He has tried NSAIDs and muscle relaxant (PT, Armen Aspirin) for the symptoms. The treatment provided mild relief.   Leg Pain   The incident occurred more than 1 week ago. The injury mechanism is unknown. The pain is present in the left leg, right leg, right thigh and left thigh. The quality of the pain is described as aching, burning, shooting and cramping. The pain is at a severity of 3/10. The pain is moderate. The pain has been worsening since onset. Associated symptoms include muscle weakness (bilaterally), numbness and tingling. Associated symptoms comments: Muscle spams. The symptoms are aggravated by movement and weight bearing. He has tried non-weight bearing, NSAIDs and rest (Armen Aspirin, Ibuprofen, PT) for the symptoms. The treatment provided moderate relief.      PEG Assessment   What number best describes your pain on average in the past week?8  What number best describes how, during the past week, pain has interfered with your enjoyment of life?8  What number best describes how, during the past week, pain has interfered with your general activity?  7    Review of Pertinent Medical Data ---  Reviewed office note from LEONARD Bobby from 4/26/2022.  Patient presents for  "follow-up appointment.  He has a history of chronic low back pain.  At the time of his appointment patient had recently underwent a L5-S1 LESI performed by Dr. Acosta on 3/25/2022 for management of low back pain.  He reported 90% ongoing relief.  Patient was initially evaluated by LEONARD Bobby on 2/10/2022. He was referred by Dr. Costello for low back pain.  Plan at that time was to attempt a lumbar epidural or bilateral SI joint injections.  Patient's back pain initially started in 2020 he was diagnosed with spinal stenosis.  He was evaluated by Dr. Costello and had surgery in June 2021.  He completed PT postop.  Patient reported that his leg heaviness had improved following the surgery but is back pain persisted which is why he was referred here for epidurals oSI joint injections.  Plan at this appointment was to do EMGs of bilateral lower extremities due to leg weakness.      Reviewed office note from Dr. Glenna Hancock MD from 10/11/2022.  Patient presented for a follow-up appointment.  Patient has a history of peripheral neuropathy and lumbar radiculopathy that he reports is getting progressively worse.  He also reports that the Duloxetine has not helped and that he is having increased pain in his right leg.  He continues with physical therapy at this time.  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  He reports having heaviness of both legs since 2020.  He states his feet feel numb and like they have \"socks folded over them\".  He had spine surgery in 2021 reports this helped his back pain but not his leg weakness.  He has had epidurals in the past but said the only helped for a few days.  Dr. Hancock discussed EMG nerve conduction study with patient which was suggestive of chronic inflammatory demyelinating neuropathy. Dr. Hancock referred him back to pain management since Duloxetine was not " effective. His insurance has denied IVIG twice but Dr. Hancock is trying to get this approved as patient is reporting that pain/symptoms are getting worse. He was encouraged to continue physical therapy.         MRI OF THE LUMBAR SPINE WITH AND WITHOUT CONTRAST 01/23/2022     CLINICAL HISTORY: Patient had lumbar spine surgery on 06/28/2021 with  lumbar laminectomy at L3-L4 for decompression. The patient complains of  left lower extremity pain and weakness.     TECHNIQUE: Sagittal T1, proton density and fat-suppressed T2  postcontrast sagittal T1-weighted images were obtained of the lumbar  spine, in addition, axial T2-weighted images were obtained from T12 to  S1, thin cut pre and postcontrast axial T1-weighted images were obtained  angled through the interspaces from L3 to S1.     COMPARISON: This is correlated to a prior MRI lumbar spine on  05/14/2021.     FINDINGS: The distal thoracic cord and conus is normal in signal  intensity, conus terminates at L1 lumbar level which is normal.     At T12-L1, the disc space and facets are normal with no canal or  foraminal narrowing.     At L1-L2, the posterior disc margin is normal. There is minimal facet  overgrowth.There is no canal or foraminal narrowing.     At L2-L3, there is minimal diffuse posterior disc bulge. Facets are  normal. There is minimal canal narrowing, no foraminal narrowing.     At L3-L4, since prior outside MRI of lumbar spine 05/14/2021, patient  has had interval surgery at the L3-L4 level on 06/28/2021, during which  there was performance of bilateral laminectomies at L3 and bilateral  medial facetectomies at L3-L4. There remains mild right and  mild-to-moderate left facet overgrowth, some fluid in the facet joints.  There is diffuse posterior disc bulge. There has been resolution of  canal narrowing. There remains slight narrowing of the lateral recesses.  There is some spurring and bulging disc material into the left neural  foramen, mildly narrows  the left foramen and may abut the exiting left  L3 nerve root. There is no right foraminal narrowing.     At L4-5, there is mild left facet overgrowth. There is 1-2 mm  retrolisthesis of L3 on L4, and mild diffuse posterior disc osteophyte  complex. There is no canal narrowing. There is perhaps slight narrowing  of the left lateral recess. There is some spurring and bulging disc  material into the foramina and there is mild bilateral foraminal  narrowing.     At L5-S1, there is mild bilateral facet overgrowth. Posterior disc  margin is normal. There is no canal or lateral recess or foraminal  narrowing.     IMPRESSION:  1. Since the prior outside MRI of the lumbar spine on 05/14/2021, the  patient had interval surgery at L3-4 lumbar level on 06/28/2021. There  is been performance of bilateral laminectomies at L3 and bilateral  medial facetectomies at L3-4. There remains mild right and mild/moderate  left facet overgrowth with a small amount of fluid in the facet joints.  There is a diffuse posterior disc bulge. There has been interval  resolution of the canal narrowing at L3-4 but there remains slight  narrowing lateral recesses and some spurring and bulging disc material  into left foramen which mildly narrows the left foramen may abut the  exiting left L3 nerve root.  2. Otherwise, there's been no change when compared to the prior MRI of  the lumbar spine on 05/14/2021. At L4-5, there is mild bilateral facet  overgrowth and 1 to 2 mm retrolisthesis of L4 and L5 and a mild diffuse  posterior disc osteophyte complex but there is no canal narrowing and  there is perhaps slight narrowing of the left lateral recess. There is  some mild spurring and bulging disc material into the neural foramina  with mild bilateral foraminal narrowing. No additional canal or  foraminal narrowing is seen in lumbar spine.     This report was finalized on 1/24/2022 8:33 AM by Dr. Gutierrez Greene M.D.    The following portions of the  "patient's history were reviewed and updated as appropriate: allergies, current medications, past family history, past medical history, past social history, past surgical history and problem list.    Review of Systems   Constitutional: Positive for fatigue.   HENT: Negative for congestion.    Eyes: Negative for visual disturbance.   Respiratory: Positive for shortness of breath.    Cardiovascular: Negative for chest pain.   Gastrointestinal: Negative for constipation and diarrhea.   Genitourinary: Negative for difficulty urinating.   Musculoskeletal: Positive for back pain.   Neurological: Positive for tingling, weakness (bilateral legs) and numbness.   Psychiatric/Behavioral: Positive for sleep disturbance. Negative for suicidal ideas. The patient is not nervous/anxious.      I have reviewed and confirmed the accuracy of the ROS as documented by the MA/LPN/RN LEONARD Carpenter    Vitals:    10/11/22 1244   BP: 128/78   Pulse: 97   Resp: 18   Temp: 98.2 °F (36.8 °C)   SpO2: 97%   Weight: 131 kg (289 lb)   Height: 185.4 cm (72.99\")   PainSc:   3   PainLoc: Back     Objective   Physical Exam  Constitutional:       Appearance: Normal appearance.   HENT:      Head: Normocephalic.   Cardiovascular:      Rate and Rhythm: Normal rate and regular rhythm.   Pulmonary:      Effort: Pulmonary effort is normal.      Breath sounds: Normal breath sounds.   Musculoskeletal:      Cervical back: Normal range of motion.      Lumbar back: Tenderness (minimal) and bony tenderness present. Positive right straight leg raise test and positive left straight leg raise test.      Comments: Negative for lumbar facet loading    Skin:     General: Skin is warm and dry.      Capillary Refill: Capillary refill takes less than 2 seconds.   Neurological:      General: No focal deficit present.      Mental Status: He is alert and oriented to person, place, and time.      Gait: Gait abnormal (slowed).   Psychiatric:         Mood and Affect: Mood " normal.         Behavior: Behavior normal.         Thought Content: Thought content normal.         Cognition and Memory: Cognition normal.       Assessment & Plan   Diagnoses and all orders for this visit:    1. Other chronic pain (Primary)    2. DDD (degenerative disc disease), lumbar    3. Lumbar radicular pain    4. Weakness of both lower extremities    5. Lumbar facet arthropathy    --- Trial Gabapentin 300mg at night, titrate up to Gabapentin 300ng TID. Discussed medication with the patient.  Included in this discussion was the potential for side effects and adverse events.  Patient verbalized understanding and wished to proceed.  Prescription will be sent to pharmacy.  --- Refill Mobic 15mg   --- Refill Flexeril 10mg  --- Return to Dr. Costello for follow up due to increased pain   --- Follow up with Dr. Hancock as needed  --- Follow-up 1 month     I spent greater than 45 minutes caring for Wei on this date of service. This time includes time spent by me in the following activities: preparing for the visit, reviewing tests, obtaining and/or reviewing a separately obtained history, performing a medically appropriate examination and/or evaluation, counseling and educating the patient/family/caregiver, ordering medications, tests, or procedures, referring and communicating with other health care professionals, documenting information in the medical record, independently interpreting results and communicating that information with the patient/family/caregiver and care coordination      MER CHRISTOPHER  As part of the patient's treatment plan, I am prescribing controlled substances. The patient has been made aware of appropriate use of such medications, including potential risk of somnolence, limited ability to drive and/or work safely, and the potential for dependence or overdose. It has also been made clear that these medications are for use by this patient only, without concomitant use of alcohol or other  substances unless prescribed.     Patient has completed prescribing agreement detailing terms of continued prescribing of controlled substances, including monitoring MER reports, urine drug screening, and pill counts if necessary. The patient is aware that inappropriate use will results in cessation of prescribing such medications.    As the clinician, I personally reviewed the MER from 10/11/22 while the patient was in the office today.    History and physical exam exhibit continued safe and appropriate use of controlled substances.    Dictated utilizing Dragon dictation.     This document is intended for medical expert use only. Reading of this document by patients and/or patient's family without participating medical staff guidance may result in misinterpretation and unintended morbidity.   Any interpretation of such data is the responsibility of the patient and/or family member responsible for the patient in concert with their primary or specialist providers, not to be left for sources of online searches such as [place], [place] or similar queries. Relying on these approaches to knowledge may result in misinterpretation, misguided goals of care and even death should patients or family members try recommendations outside of the realm of professional medical care in a supervised way.    Patient remained masked during entire encounter. No cough present. I donned a mask and eye protection throughout entire visit. Prior to donning mask and eye protection, hand hygiene was performed, as well as when it was doffed.  I was closer than 6 feet, but not for an extended period of time. No obvious exposure to any bodily fluids.

## 2022-10-18 ENCOUNTER — HOSPITAL ENCOUNTER (OUTPATIENT)
Dept: GENERAL RADIOLOGY | Facility: HOSPITAL | Age: 57
Discharge: HOME | End: 2022-10-18
Admitting: FAMILY MEDICINE

## 2022-10-18 ENCOUNTER — OFFICE VISIT (OUTPATIENT)
Dept: FAMILY MEDICINE CLINIC | Facility: CLINIC | Age: 57
End: 2022-10-18

## 2022-10-18 VITALS
WEIGHT: 288 LBS | HEART RATE: 91 BPM | BODY MASS INDEX: 38.17 KG/M2 | OXYGEN SATURATION: 96 % | TEMPERATURE: 98.4 F | SYSTOLIC BLOOD PRESSURE: 136 MMHG | DIASTOLIC BLOOD PRESSURE: 86 MMHG | HEIGHT: 73 IN

## 2022-10-18 DIAGNOSIS — J06.9 VIRAL UPPER RESPIRATORY TRACT INFECTION: Primary | ICD-10-CM

## 2022-10-18 DIAGNOSIS — R05.1 ACUTE COUGH: ICD-10-CM

## 2022-10-18 LAB
EXPIRATION DATE: NORMAL
FLUAV AG UPPER RESP QL IA.RAPID: NOT DETECTED
FLUBV AG UPPER RESP QL IA.RAPID: NOT DETECTED
INTERNAL CONTROL: NORMAL
Lab: NORMAL
SARS-COV-2 AG UPPER RESP QL IA.RAPID: NOT DETECTED

## 2022-10-18 PROCEDURE — 71046 X-RAY EXAM CHEST 2 VIEWS: CPT

## 2022-10-18 PROCEDURE — 87428 SARSCOV & INF VIR A&B AG IA: CPT | Performed by: FAMILY MEDICINE

## 2022-10-18 PROCEDURE — 99213 OFFICE O/P EST LOW 20 MIN: CPT | Performed by: FAMILY MEDICINE

## 2022-10-18 NOTE — PROGRESS NOTES
"  Chief Complaint   Patient presents with   • Cough   • Headache       Upper Respiratory Infection: Patient complains of symptoms of a URI. Symptoms include congestion, coryza, cough and irritability. Onset of symptoms was 7 days ago, unchanged since that time. He also c/o achiness for the past 7 days .  He is drinking moderate amounts of fluids. Evaluation to date: none. Treatment to date: none.  Ill contacts at home or school or work discussed.      Vitals:    10/18/22 0953   BP: 136/86   Pulse: 91   Temp: 98.4 °F (36.9 °C)   SpO2: 96%   Weight: 131 kg (288 lb)   Height: 185.4 cm (72.99\")     Gen: alert  Ears: Tm's bulging without redness  Nose:  Congestion  Throat:  Red without exudate, some drainage, tonsils okay  Neck: no LAD  Lung: good air movement, regular RR  Heart: RR without murmur  Skin: no rash.      Assessment & Plan   Diagnoses and all orders for this visit:    1. Viral upper respiratory tract infection (Primary)    2. Acute cough  -     XR Chest 2 View; Future  -     POCT SARS-CoV-2 Antigen JODIE + Flu    Negative for Flu and covid  Patient states he is improving with conservative care  No fevers today, VSS, will obtain CXR as mild crackling RLQ lung         Tylenol or Advil as needed for pain, fever, muscle aches  Plenty of fluids  Hand washing discussed  Off work or school note given if needed.  Warm tea for throat.  Pros and cons of antibiotic use discussed    Dr. Shayna Messina MD  [place]  [facility]    "

## 2022-10-21 DIAGNOSIS — J01.00 ACUTE NON-RECURRENT MAXILLARY SINUSITIS: Primary | ICD-10-CM

## 2022-10-21 RX ORDER — AMOXICILLIN 500 MG/1
500 CAPSULE ORAL 2 TIMES DAILY
Qty: 14 CAPSULE | Refills: 0 | Status: SHIPPED | OUTPATIENT
Start: 2022-10-21 | End: 2022-10-28

## 2022-11-14 DIAGNOSIS — M54.16 LUMBAR RADICULAR PAIN: ICD-10-CM

## 2022-11-14 RX ORDER — GABAPENTIN 300 MG/1
300 CAPSULE ORAL 3 TIMES DAILY
Qty: 90 CAPSULE | Refills: 0 | Status: SHIPPED | OUTPATIENT
Start: 2022-11-14 | End: 2022-11-22 | Stop reason: DRUGHIGH

## 2022-11-22 ENCOUNTER — OFFICE VISIT (OUTPATIENT)
Dept: PAIN MEDICINE | Facility: CLINIC | Age: 57
End: 2022-11-22

## 2022-11-22 VITALS
HEIGHT: 73 IN | HEART RATE: 73 BPM | SYSTOLIC BLOOD PRESSURE: 132 MMHG | DIASTOLIC BLOOD PRESSURE: 82 MMHG | BODY MASS INDEX: 37.91 KG/M2 | OXYGEN SATURATION: 98 % | RESPIRATION RATE: 20 BRPM | TEMPERATURE: 98.1 F | WEIGHT: 286 LBS

## 2022-11-22 DIAGNOSIS — M47.816 LUMBAR FACET ARTHROPATHY: ICD-10-CM

## 2022-11-22 DIAGNOSIS — M51.36 DDD (DEGENERATIVE DISC DISEASE), LUMBAR: ICD-10-CM

## 2022-11-22 DIAGNOSIS — R29.898 WEAKNESS OF BOTH LOWER EXTREMITIES: ICD-10-CM

## 2022-11-22 DIAGNOSIS — G89.29 OTHER CHRONIC PAIN: ICD-10-CM

## 2022-11-22 DIAGNOSIS — M54.16 LUMBAR RADICULAR PAIN: Primary | ICD-10-CM

## 2022-11-22 PROCEDURE — 99214 OFFICE O/P EST MOD 30 MIN: CPT

## 2022-11-22 RX ORDER — GABAPENTIN 600 MG/1
600 TABLET ORAL 3 TIMES DAILY
Qty: 90 TABLET | Refills: 0 | Status: SHIPPED | OUTPATIENT
Start: 2022-11-22 | End: 2022-12-27 | Stop reason: SDUPTHER

## 2022-11-22 NOTE — PROGRESS NOTES
"CHIEF COMPLAINT  Back and bilateral leg pain    Subjective   Wei Salgado is a 57 y.o. male  who presents for follow-up.  He has a history of chronic low back and leg pain. He reports that his leg pain has worsened since his last office visit.      Today pain is 3/10VAS in severity. Pain is located in his low back and bilateral legs. Leg pain is worse than back pain. Describes this pain as a nearly continuous pain. He reports that they feel \"heavy, tired, and weak\". Pain is worsened by prolonged standing, walking long distances, and sitting. 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  Pain improves with rest/reposition. He has tried Armen Aspirin and Ibuprofen which has only offered minimal relief. He has taken Flexeril and Mobic in the past and feels that they were \"somewhat\" helpful. He continues with PT twice a week. He reports this is helpful while there but leg pain returns shortly after leaving.    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 Patient is being followed by Dr. Hancock with Neurology at [facility]. An EMG completed in September 2022 that was ordered by her showed the same findings. He has received 1 IVIG infusion since his last office visit.    He was started on Gabapentin 300mg TID at last office visit. He reports that this was helpful with his leg pain initially but he has noticed this has not been as effective since he received his IVIG infusion.     Patient has also seen Dr. Costello in the past for back and leg pain. At follow up appointment on 2/1/22, Dr. Costello stated that in order to address his facet arthropathy, he would need to consider complete removal of the facet and spinal fusion but at this time felt he could not justify doing so. He did not feel that this approach would help his chronic low back pain and referred him back to pain management for " interventional options.    Patient reports that he has had 2 epidurals in the past but pain relief was only temporary. He is not interested in repeating them at this time.      Procedures:  3/25/22 - L5-S1 LESI - 90% relief x 2-3 weeks  2/17/22 - L5-S1 LESI - 80% relief for 3-4 days     Surgical History:   Decompressive laminectomy at L3-L4 - June 2021    Back Pain  This is a chronic problem. The current episode started more than 1 year ago. The problem occurs intermittently. The problem has been gradually worsening since onset. The pain is present in the lumbar spine. The pain is at a severity of 7/10. The symptoms are aggravated by standing, bending, position and sitting (working on ground). Associated symptoms include leg pain, numbness (bilat foot), tingling and weakness (bilat leg). Pertinent negatives include no chest pain, fever or headaches. (Muscle spams) He has tried NSAIDs and muscle relaxant (PT, Armen Aspirin) for the symptoms. The treatment provided mild relief.   Leg Pain   The incident occurred more than 1 week ago. The injury mechanism is unknown. The pain is present in the left leg, right leg, right thigh and left thigh. The quality of the pain is described as aching, burning, shooting and cramping. The pain is at a severity of 3/10. The pain is moderate. The pain has been worsening since onset. Associated symptoms include muscle weakness (bilaterally), numbness (bilat foot) and tingling. Associated symptoms comments: Muscle spams. The symptoms are aggravated by movement and weight bearing. He has tried non-weight bearing, NSAIDs and rest (Armen Aspirin, Ibuprofen, PT) for the symptoms. The treatment provided moderate relief.      PEG Assessment   What number best describes your pain on average in the past week?7  What number best describes how, during the past week, pain has interfered with your enjoyment of life?7  What number best describes how, during the past week, pain has interfered with  "your general activity?  7    The following portions of the patient's history were reviewed and updated as appropriate: allergies, current medications, past family history, past medical history, past social history, past surgical history and problem list.    Review of Systems   Constitutional: Positive for activity change (dec). Negative for fatigue and fever.   HENT: Negative for congestion.    Eyes: Negative for visual disturbance.   Respiratory: Negative for cough and shortness of breath.    Cardiovascular: Negative for chest pain.   Gastrointestinal: Negative for constipation and diarrhea.   Genitourinary: Negative for difficulty urinating.   Musculoskeletal: Positive for arthralgias (bilat leg) and back pain.   Neurological: Positive for tingling, weakness (bilat leg) and numbness (bilat foot). Negative for dizziness, light-headedness and headaches.   Psychiatric/Behavioral: Negative for agitation, sleep disturbance and suicidal ideas. The patient is not nervous/anxious.      I have reviewed and confirmed the accuracy of the ROS as documented by the MA/LPN/RN LEONARD Carpenter    Vitals:    11/22/22 0921   BP: 132/82   Pulse: 73   Resp: 20   Temp: 98.1 °F (36.7 °C)   SpO2: 98%   Weight: 130 kg (286 lb)   Height: 185.4 cm (72.99\")   PainSc:   7   PainLoc: Back  Comment: and bilat leg     Objective   Physical Exam  Constitutional:       Appearance: Normal appearance.   HENT:      Head: Normocephalic.   Cardiovascular:      Rate and Rhythm: Normal rate and regular rhythm.   Pulmonary:      Effort: Pulmonary effort is normal.      Breath sounds: Normal breath sounds.   Musculoskeletal:      Cervical back: Normal range of motion.      Lumbar back: Tenderness (minimal) and bony tenderness present. Positive right straight leg raise test and positive left straight leg raise test.      Comments: Negative for lumbar facet loading    Skin:     General: Skin is warm and dry.      Capillary Refill: Capillary refill " takes less than 2 seconds.   Neurological:      General: No focal deficit present.      Mental Status: He is alert and oriented to person, place, and time.      Gait: Gait abnormal (slowed).   Psychiatric:         Mood and Affect: Mood normal.         Behavior: Behavior normal.         Thought Content: Thought content normal.         Cognition and Memory: Cognition normal.       Assessment & Plan   Diagnoses and all orders for this visit:    1. Lumbar radicular pain (Primary)  -     gabapentin (NEURONTIN) 600 MG tablet; Take 1 tablet by mouth 3 (Three) Times a Day. Take 1 capsule by mouth at night for 5-7 days. If no adverse reactions, may increase to 2 times per day with goal of 3 times per day.  Dispense: 90 tablet; Refill: 0    2. Other chronic pain    3. DDD (degenerative disc disease), lumbar    4. Weakness of both lower extremities    5. Lumbar facet arthropathy    --- Increased Gabapentin to 600mg TID. Discussed medication with the patient.  Included in this discussion was the potential for side effects and adverse events.  Patient verbalized understanding and wished to proceed.  Prescription will be sent to pharmacy.  --- Brief discussion about SCS. He wants to discuss this with his primary care doctor who is familiar with his medical history.  --- Patient is considering seeking care at a tertiary clinic as recommended by his PCP.  --- Follow-up 1 month     MER REPORT  As part of the patient's treatment plan, I am prescribing controlled substances. The patient has been made aware of appropriate use of such medications, including potential risk of somnolence, limited ability to drive and/or work safely, and the potential for dependence or overdose. It has also been made clear that these medications are for use by this patient only, without concomitant use of alcohol or other substances unless prescribed.     Patient has completed prescribing agreement detailing terms of continued prescribing of controlled  substances, including monitoring MER reports, urine drug screening, and pill counts if necessary. The patient is aware that inappropriate use will results in cessation of prescribing such medications.    As the clinician, I personally reviewed the MER from 11/22/22 while the patient was in the office today.    History and physical exam exhibit continued safe and appropriate use of controlled substances.    Dictated utilizing Dragon dictation.     Patient remained masked during entire encounter. No cough present. I donned a mask and eye protection throughout entire visit. Prior to donning mask and eye protection, hand hygiene was performed, as well as when it was doffed.  I was closer than 6 feet, but not for an extended period of time. No obvious exposure to any bodily fluids.

## 2022-11-30 ENCOUNTER — TELEPHONE (OUTPATIENT)
Dept: FAMILY MEDICINE CLINIC | Facility: CLINIC | Age: 57
End: 2022-11-30

## 2022-11-30 NOTE — TELEPHONE ENCOUNTER
-Can we ask him to schedule a follow up with me Joshua or injudon 30 minutes within the next two weeks before I leave practice. So  I can adequately have scheduled time to review his case and any new changes.     -Dr Manriquez is also welcome to call the office on days I am in office, I can provide him with a phone # to use.

## 2022-11-30 NOTE — TELEPHONE ENCOUNTER
Wei sent a message advising that Dr. Manriquez has been trying to reach you and would like for you to call her. I advised Gilberto that to our knowledge no from Dr. Manriquez's office has called. I also advised that he could sign a records release and we could send his records to Dr. Manriquez and that it would be up to you if you called her or not.

## 2022-11-30 NOTE — TELEPHONE ENCOUNTER
Spoke with Dr Manriquez direct primary care who has followed along with the patient for years. 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 She will reach out to [facility] and inquire about the referrals process    Patient will also need to establish with a new [facility] PCP as this provider leaving the practice Dec 19th, 2022. Will provide patient list of options.

## 2022-12-15 NOTE — PROGRESS NOTES
Chief Complaint   Patient presents with   • Leg Pain       Subjective   Wei Salgado is a 57 y.o. male.     History of Present Illness   Here for F/u review of peripheral  Neuropathy    Has been experiencing worsening peripheral neuropathy and lumbar radiculopathy along with gait imbalance and leg weakness    He is seeing Neurology as well as pain management currently.     -Had a EMG nerve conduction study from September 2022 was suggestive of moderate mixed axonal and demyelinating polyneuropathy which could be a variant of CIDP. Received IVIG and it did not help however so not suggestive of CIDP per neurology    S/p surgery for lumbar spinal stenosis. Saw Neurosurgery Dr Costello. Everything from surgery was stable    He also sees [facility] Dr Manriquez who is arranged referral to tertiary care center. Can review prior notes regarding this as well      The following portions of the patient's history were reviewed and updated as appropriate: allergies, current medications, past family history, past medical history, past social history, past surgical history and problem list.      Past Medical History:   Diagnosis Date   • Allergic    • Arthritis    • Headache    • Hyperlipidemia    • Hypertension    • Sleep apnea     USES CPAP        Past Surgical History:   Procedure Laterality Date   • BACK SURGERY     • COLONOSCOPY N/A 11/20/2020    Procedure: COLONOSCOPY INTO CECUM;  Surgeon: Jesse Frey MD;  Location: [facility] ENDOSCOPY;  Service: Gastroenterology;  Laterality: N/A;  PRE: SCREENING  POST: HEMORRHOIDS   • CYSTOSCOPY URETEROSCOPY LASER LITHOTRIPSY Left 9/27/2021    Procedure: LEFT URETEROSCOPY LASER LITHOTRIPSY, STONE BASKET, POSSIBLE STENT;  Surgeon: Jethro Valentin MD;  Location: [facility];  Service: Urology;  Laterality: Left;   • HAND SURGERY  1977   • LUMBAR EPIDURAL INJECTION N/A 2/17/2022    Procedure: lumbar epidural steroid injection lumbar4-5 or lumbar5-sacral1;  Surgeon: Mitch Acosta MD;   Location: [facility] MAIN OR;  Service: Pain Management;  Laterality: N/A;   • LUMBAR EPIDURAL INJECTION N/A 3/29/2022    Procedure: lumbar epidural steroid injection lumbar5-sacral1;  Surgeon: Mitch Acosta MD;  Location: [facility] MAIN OR;  Service: Pain Management;  Laterality: N/A;   • LUMBAR LAMINECTOMY DISCECTOMY DECOMPRESSION Bilateral 6/28/2021    Procedure: Lumbar laminectomy lumbar 3 lumbar 4 for bilateral decompression;  Surgeon: Bernabe Costello MD;  Location: [facility] MAIN OR;  Service: Neurosurgery;  Laterality: Bilateral;   • WISDOM TOOTH EXTRACTION         Family History   Problem Relation Age of Onset   • Arthritis Mother    • Mental illness Mother    • Stroke Father    • No Known Problems Sister    • No Known Problems Brother    • No Known Problems Brother    • No Known Problems Brother    • Malig Hyperthermia Neg Hx        Social History     Socioeconomic History   • Marital status:    Tobacco Use   • Smoking status: Never   • Smokeless tobacco: Never   Vaping Use   • Vaping Use: Never used   Substance and Sexual Activity   • Alcohol use: Yes     Comment: very seldom   • Drug use: Never   • Sexual activity: Defer       Current Outpatient Medications on File Prior to Visit   Medication Sig Dispense Refill   • fluticasone (FLONASE) 50 MCG/ACT nasal spray 2 sprays into the nostril(s) as directed by provider Daily.     • gabapentin (NEURONTIN) 600 MG tablet Take 1 tablet by mouth 3 (Three) Times a Day. Take 1 capsule by mouth at night for 5-7 days. If no adverse reactions, may increase to 2 times per day with goal of 3 times per day. 90 tablet 0   • meloxicam (MOBIC) 15 MG tablet Take 1 tablet by mouth Daily. 60 tablet 0   • vitamin B-12 (CYANOCOBALAMIN) 1000 MCG tablet Take 1 tablet by mouth Daily. 30 tablet 5   • [DISCONTINUED] losartan-hydrochlorothiazide (Hyzaar) 100-25 MG per tablet Take 1 tablet by mouth Daily. 90 tablet 1     No current facility-administered medications on file prior  to visit.       Review of Systems   Constitutional: Negative for fever.   HENT: Negative for congestion.    Respiratory: Negative for cough.    Cardiovascular: Negative for chest pain.   Gastrointestinal: Negative for abdominal pain.   Neurological: Negative for weakness.   Psychiatric/Behavioral: Negative for decreased concentration.           Vitals:    12/16/22 1450   BP: 110/86   Pulse: 100   Temp: 97.5 °F (36.4 °C)   SpO2: 98%      Objective   Physical Exam  Vitals reviewed.   Cardiovascular:      Rate and Rhythm: Normal rate and regular rhythm.      Pulses: Normal pulses.   Pulmonary:      Effort: Pulmonary effort is normal.   Abdominal:      General: Abdomen is flat.   Neurological:      General: No focal deficit present.      Mental Status: He is alert.   Psychiatric:         Mood and Affect: Mood normal.           Assessment & Plan   Problems Addressed this Visit    None  Visit Diagnoses     Peripheral polyneuropathy    -  Primary    Essential hypertension        Relevant Medications    losartan-hydrochlorothiazide (Hyzaar) 100-25 MG per tablet      Diagnoses       Codes Comments    Peripheral polyneuropathy    -  Primary ICD-10-CM: G62.9  ICD-9-CM: 356.9     Essential hypertension     ICD-10-CM: I10  ICD-9-CM: 401.9         Referral to tertiary care center per Dr Manriquez  Agree with Pain management increase in Gabapentin to 600mg    Would have him establish with new [facility] pcp as this one leaving practice      Shayna Messina MD

## 2022-12-16 ENCOUNTER — OFFICE VISIT (OUTPATIENT)
Dept: FAMILY MEDICINE CLINIC | Facility: CLINIC | Age: 57
End: 2022-12-16

## 2022-12-16 ENCOUNTER — OFFICE VISIT (OUTPATIENT)
Dept: PAIN MEDICINE | Facility: CLINIC | Age: 57
End: 2022-12-16

## 2022-12-16 VITALS
SYSTOLIC BLOOD PRESSURE: 110 MMHG | HEART RATE: 100 BPM | TEMPERATURE: 97.5 F | HEIGHT: 73 IN | BODY MASS INDEX: 37.77 KG/M2 | OXYGEN SATURATION: 98 % | WEIGHT: 285 LBS | DIASTOLIC BLOOD PRESSURE: 86 MMHG

## 2022-12-16 VITALS
TEMPERATURE: 98 F | HEIGHT: 73 IN | SYSTOLIC BLOOD PRESSURE: 129 MMHG | OXYGEN SATURATION: 98 % | WEIGHT: 287 LBS | HEART RATE: 75 BPM | RESPIRATION RATE: 20 BRPM | DIASTOLIC BLOOD PRESSURE: 82 MMHG | BODY MASS INDEX: 38.04 KG/M2

## 2022-12-16 DIAGNOSIS — M51.36 DDD (DEGENERATIVE DISC DISEASE), LUMBAR: ICD-10-CM

## 2022-12-16 DIAGNOSIS — G89.29 OTHER CHRONIC PAIN: ICD-10-CM

## 2022-12-16 DIAGNOSIS — R29.898 WEAKNESS OF BOTH LOWER EXTREMITIES: ICD-10-CM

## 2022-12-16 DIAGNOSIS — M54.16 LUMBAR RADICULAR PAIN: Primary | ICD-10-CM

## 2022-12-16 DIAGNOSIS — G62.9 PERIPHERAL POLYNEUROPATHY: Primary | ICD-10-CM

## 2022-12-16 DIAGNOSIS — I10 ESSENTIAL HYPERTENSION: ICD-10-CM

## 2022-12-16 PROCEDURE — 99214 OFFICE O/P EST MOD 30 MIN: CPT | Performed by: FAMILY MEDICINE

## 2022-12-16 PROCEDURE — 99213 OFFICE O/P EST LOW 20 MIN: CPT

## 2022-12-16 RX ORDER — LOSARTAN POTASSIUM AND HYDROCHLOROTHIAZIDE 25; 100 MG/1; MG/1
1 TABLET ORAL DAILY
Qty: 90 TABLET | Refills: 1 | Status: SHIPPED | OUTPATIENT
Start: 2022-12-16

## 2022-12-16 NOTE — PROGRESS NOTES
"CHIEF COMPLAINT  Back and bilateral leg pain    Subjective   Wei aSlgado is a 57 y.o. male  who presents for follow-up.  He has a history of chronic low back and leg pain. He reports that his pain has remained consistent since his last office visit.     Today pain is 2/10VAS in severity. Pain is located in his low back and bilateral legs. Leg pain is worse than back pain. Describes this pain as a nearly continuous pain. He reports that they feel \"heavy, tired, and weak\". Pain is worsened by prolonged standing, walking long distances, and sitting. 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  Pain improves with rest/reposition. He has tried Armen Aspirin and Ibuprofen which has only offered minimal relief. He has taken Flexeril and Mobic in the past and feels that they were \"somewhat\" helpful. He continues with PT twice a week. He reports this is helpful while there but leg pain returns shortly after leaving.     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 Patient is being followed by Dr. Hancock with Neurology at [facility]. An EMG completed in September 2022 that was ordered by her showed the same findings. He has received 1 IVIG infusion since his last office visit.    He was previously on Gabapentin 300mg TID. He reports that this was helpful with his leg pain initially but noticed this medication was not as effective after he received his last IVIG infusion. Gabapentin was increased to 600mg TID at last office visit. He reports that he can not tell that this increase has been effective but has only been taking it TID for about a little over a week.    Patient has also seen Dr. Costello in the past for back and leg pain. At follow up appointment on 2/1/22, Dr. Costello stated that in order to address his facet arthropathy, he would need to consider complete removal of the facet and spinal " fusion but at this time felt he could not justify doing so. He did not feel that this approach would help his chronic low back pain and referred him back to pain management for interventional options.     Patient reports that he has had 2 epidurals in the past but pain relief was only temporary. He is not interested in repeating them at this time.     Appointment to see Dr. Messina at 3:00pm today to discuss getting referral for [facility] for further evaluation.      Procedures:  3/25/22 - L5-S1 LESI - 90% relief x 2-3 weeks  2/17/22 - L5-S1 LESI - 80% relief for 3-4 days     Surgical History:   Decompressive laminectomy at L3-L4 - June 2021    Back Pain  This is a chronic problem. The current episode started more than 1 year ago. The problem occurs intermittently. The problem has been waxing and waning since onset. The pain is present in the lumbar spine. The pain is at a severity of 2/10. The symptoms are aggravated by standing, bending, position and sitting (working on ground). Associated symptoms include leg pain and tingling. Pertinent negatives include no chest pain, fever, headaches, numbness or weakness. (Muscle spams) He has tried NSAIDs and muscle relaxant (PT, Armen Aspirin) for the symptoms. The treatment provided mild relief.   Leg Pain   The incident occurred more than 1 week ago. The injury mechanism is unknown. The pain is present in the left leg, right leg, right thigh and left thigh. The quality of the pain is described as aching, burning, shooting and cramping. The pain is at a severity of 2/10. The pain is moderate. The pain has been fluctuating since onset. Associated symptoms include muscle weakness (bilaterally) and tingling. Pertinent negatives include no numbness. Associated symptoms comments: Muscle spams. The symptoms are aggravated by movement and weight bearing. He has tried non-weight bearing, NSAIDs and rest (Armen Aspirin, Ibuprofen, PT) for the symptoms. The treatment provided  "moderate relief.      PEG Assessment   What number best describes your pain on average in the past week?7  What number best describes how, during the past week, pain has interfered with your enjoyment of life?7  What number best describes how, during the past week, pain has interfered with your general activity?  7    The following portions of the patient's history were reviewed and updated as appropriate: allergies, current medications, past family history, past medical history, past social history, past surgical history and problem list.    Review of Systems   Constitutional: Negative for activity change, fatigue and fever.   HENT: Negative for congestion.    Eyes: Negative for visual disturbance.   Respiratory: Negative for cough and shortness of breath.    Cardiovascular: Negative for chest pain.   Gastrointestinal: Negative for constipation and diarrhea.   Genitourinary: Negative for difficulty urinating.   Musculoskeletal: Positive for arthralgias (bilat leg) and back pain.   Neurological: Positive for tingling. Negative for dizziness, weakness, light-headedness, numbness and headaches.   Psychiatric/Behavioral: Negative for agitation, sleep disturbance and suicidal ideas. The patient is not nervous/anxious.      I have reviewed and confirmed the accuracy of the ROS as documented by the MA/LPN/RN LEONARD Carpenter    Vitals:    12/16/22 0818   BP: 129/82   Pulse: 75   Resp: 20   Temp: 98 °F (36.7 °C)   SpO2: 98%   Weight: 130 kg (287 lb)   Height: 185.4 cm (72.99\")   PainSc:   2   PainLoc: Back  Comment: and bilat leg     Objective   Physical Exam  Constitutional:       Appearance: Normal appearance.   HENT:      Head: Normocephalic.   Cardiovascular:      Rate and Rhythm: Normal rate and regular rhythm.   Pulmonary:      Effort: Pulmonary effort is normal.      Breath sounds: Normal breath sounds.   Musculoskeletal:      Cervical back: Normal range of motion.      Lumbar back: Tenderness (minimal) and " bony tenderness present. Positive right straight leg raise test and positive left straight leg raise test.      Comments: Negative for lumbar facet loading    Skin:     General: Skin is warm and dry.      Capillary Refill: Capillary refill takes less than 2 seconds.   Neurological:      General: No focal deficit present.      Mental Status: He is alert and oriented to person, place, and time.      Gait: Gait abnormal (slowed).   Psychiatric:         Mood and Affect: Mood normal.         Behavior: Behavior normal.         Thought Content: Thought content normal.         Cognition and Memory: Cognition normal.       Assessment & Plan   Diagnoses and all orders for this visit:    1. Lumbar radicular pain (Primary)    2. Other chronic pain    3. DDD (degenerative disc disease), lumbar    4. Weakness of both lower extremities    --- Continue Gabapentin. May consider increasing to 800mg TID but he would like to discuss this with his PCP Dr. Messina. He has an appointment with her today at 3:00pm.  --- Follow-up 2 months or sooner if needed     MER REPORT  As part of the patient's treatment plan, I am prescribing controlled substances. The patient has been made aware of appropriate use of such medications, including potential risk of somnolence, limited ability to drive and/or work safely, and the potential for dependence or overdose. It has also been made clear that these medications are for use by this patient only, without concomitant use of alcohol or other substances unless prescribed.     Patient has completed prescribing agreement detailing terms of continued prescribing of controlled substances, including monitoring MER reports, urine drug screening, and pill counts if necessary. The patient is aware that inappropriate use will results in cessation of prescribing such medications.    As the clinician, I personally reviewed the MER from 12/16/22 while the patient was in the office today.    History and  physical exam exhibit continued safe and appropriate use of controlled substances.    Dictated utilizing Dragon dictation.     Patient remained masked during entire encounter. No cough present. I donned a mask and eye protection throughout entire visit. Prior to donning mask and eye protection, hand hygiene was performed, as well as when it was doffed.  I was closer than 6 feet, but not for an extended period of time. No obvious exposure to any bodily fluids.

## 2022-12-27 DIAGNOSIS — M54.16 LUMBAR RADICULAR PAIN: ICD-10-CM

## 2022-12-27 RX ORDER — MELOXICAM 15 MG/1
15 TABLET ORAL DAILY
Qty: 60 TABLET | Refills: 0 | Status: SHIPPED | OUTPATIENT
Start: 2022-12-27 | End: 2023-01-31 | Stop reason: SDUPTHER

## 2022-12-27 RX ORDER — GABAPENTIN 600 MG/1
600 TABLET ORAL 3 TIMES DAILY
Qty: 90 TABLET | Refills: 0 | Status: SHIPPED | OUTPATIENT
Start: 2022-12-27 | End: 2023-01-31 | Stop reason: SDUPTHER

## 2023-01-31 DIAGNOSIS — M54.16 LUMBAR RADICULAR PAIN: ICD-10-CM

## 2023-01-31 RX ORDER — GABAPENTIN 600 MG/1
600 TABLET ORAL 3 TIMES DAILY
Qty: 90 TABLET | Refills: 0 | Status: SHIPPED | OUTPATIENT
Start: 2023-01-31 | End: 2023-02-13

## 2023-01-31 RX ORDER — MELOXICAM 15 MG/1
15 TABLET ORAL DAILY
Qty: 60 TABLET | Refills: 0 | Status: SHIPPED | OUTPATIENT
Start: 2023-01-31

## 2023-02-02 ENCOUNTER — OFFICE VISIT (OUTPATIENT)
Dept: INTERNAL MEDICINE | Facility: CLINIC | Age: 58
End: 2023-02-02
Payer: COMMERCIAL

## 2023-02-02 VITALS
WEIGHT: 293.3 LBS | DIASTOLIC BLOOD PRESSURE: 68 MMHG | RESPIRATION RATE: 16 BRPM | BODY MASS INDEX: 38.87 KG/M2 | OXYGEN SATURATION: 96 % | TEMPERATURE: 98.2 F | HEART RATE: 59 BPM | SYSTOLIC BLOOD PRESSURE: 126 MMHG | HEIGHT: 73 IN

## 2023-02-02 DIAGNOSIS — G62.9 PERIPHERAL POLYNEUROPATHY: Primary | ICD-10-CM

## 2023-02-02 PROCEDURE — 99214 OFFICE O/P EST MOD 30 MIN: CPT | Performed by: INTERNAL MEDICINE

## 2023-02-02 RX ORDER — COVID-19 MOLECULAR TEST ASSAY
KIT MISCELLANEOUS
COMMUNITY
Start: 2023-01-09 | End: 2023-03-23 | Stop reason: SDUPTHER

## 2023-02-02 RX ORDER — ASPIRIN,CAFFEINE 35.5; 5 MG/1; MG/1
TABLET ORAL
COMMUNITY

## 2023-02-03 LAB
ALBUMIN SERPL-MCNC: 4.6 G/DL (ref 3.5–5.2)
ALBUMIN/GLOB SERPL: 1.8 G/DL
ALP SERPL-CCNC: 77 U/L (ref 39–117)
ALT SERPL-CCNC: 28 U/L (ref 1–41)
AST SERPL-CCNC: 30 U/L (ref 1–40)
BILIRUB SERPL-MCNC: 0.7 MG/DL (ref 0–1.2)
BUN SERPL-MCNC: 18 MG/DL (ref 6–20)
BUN/CREAT SERPL: 15.7 (ref 7–25)
CALCIUM SERPL-MCNC: 9.6 MG/DL (ref 8.6–10.5)
CHLORIDE SERPL-SCNC: 101 MMOL/L (ref 98–107)
CHOLEST SERPL-MCNC: 213 MG/DL (ref 0–200)
CK SERPL-CCNC: 523 U/L (ref 20–200)
CO2 SERPL-SCNC: 31.6 MMOL/L (ref 22–29)
CREAT SERPL-MCNC: 1.15 MG/DL (ref 0.76–1.27)
EGFRCR SERPLBLD CKD-EPI 2021: 74.2 ML/MIN/1.73
GLOBULIN SER CALC-MCNC: 2.5 GM/DL
GLUCOSE SERPL-MCNC: 101 MG/DL (ref 65–99)
HBA1C MFR BLD: 5.7 % (ref 4.8–5.6)
HDLC SERPL-MCNC: 40 MG/DL (ref 40–60)
LDLC SERPL CALC-MCNC: 147 MG/DL (ref 0–100)
POTASSIUM SERPL-SCNC: 3.6 MMOL/L (ref 3.5–5.2)
PROT SERPL-MCNC: 7.1 G/DL (ref 6–8.5)
SODIUM SERPL-SCNC: 139 MMOL/L (ref 136–145)
TRIGL SERPL-MCNC: 146 MG/DL (ref 0–150)
VLDLC SERPL CALC-MCNC: 26 MG/DL (ref 5–40)

## 2023-02-03 NOTE — PROGRESS NOTES
"Chief Complaint  Establish Care (Patient has had neuropathy for 2 years/[facility] can't see him due to Medicaid insurance/Dr. Manriquez had him do a Lyme Dx panel yesterday)    Subjective        Wei Salgado presents to [facility]  History of Present Illness  Here to establish care.     Longstanding history of bilateral lower extremity paresthesias/pain, lower back pain. Seen by neurology, has had 2 EMG which showed peripheral neuropathy. Most recently seen by Dr. Manriquez at [facility] and has lyme testing pending, other extensive neuropathy lab workup has been unrevealing. Initially responded well to gabapentin but then got IVIG and since then gabapentin has been ineffective. Did increase to 900mg TID per Dr. Manriquez and awaiting to see if that will help. Has trialed PT in past without much improvement. Seems to be worsening now.    He did have AIMEE testing that was negative.   MRI L lilibeth 12/2021 showed: \"1. Since the prior outside MRI of the lumbar spine on 05/14/2021, the  patient had interval surgery at L3-4 lumbar level on 06/28/2021. There  is been performance of bilateral laminectomies at L3 and bilateral  medial facetectomies at L3-4. There remains mild right and mild/moderate  left facet overgrowth with a small amount of fluid in the facet joints.  There is a diffuse posterior disc bulge. There has been interval  resolution of the canal narrowing at L3-4 but there remains slight  narrowing lateral recesses and some spurring and bulging disc material  into left foramen which mildly narrows the left foramen may abut the  exiting left L3 nerve root.  2. Otherwise, there's been no change when compared to the prior MRI of  the lumbar spine on 05/14/2021. At L4-5, there is mild bilateral facet  overgrowth and 1 to 2 mm retrolisthesis of L4 and L5 and a mild diffuse  posterior disc osteophyte complex but there is no canal narrowing and  there is perhaps slight narrowing " "of the left lateral recess. There is  some mild spurring and bulging disc material into the neural foramina  with mild bilateral foraminal narrowing. No additional canal or  foraminal narrowing is seen in lumbar spine.\"    He was going to go to [facility] but due to insurance was not able to be covered and out of pocket cost was going to be quite expensive.      Current Outpatient Medications:   •  Aspirin-Caffeine ([employer]) 500-32.5 MG tablet, Take  by mouth., Disp: , Rfl:   •  fluticasone (FLONASE) 50 MCG/ACT nasal spray, 2 sprays into the nostril(s) as directed by provider Daily., Disp: , Rfl:   •  gabapentin (NEURONTIN) 600 MG tablet, Take 1 tablet by mouth 3 (Three) Times a Day. (Patient taking differently: Take 300 mg by mouth 3 (Three) Times a Day. Patient taking 900 mg tid), Disp: 90 tablet, Rfl: 0  •  losartan-hydrochlorothiazide (Hyzaar) 100-25 MG per tablet, Take 1 tablet by mouth Daily., Disp: 90 tablet, Rfl: 1  •  meloxicam (MOBIC) 15 MG tablet, Take 1 tablet by mouth Daily., Disp: 60 tablet, Rfl: 0  •  vitamin B-12 (CYANOCOBALAMIN) 1000 MCG tablet, Take 1 tablet by mouth Daily., Disp: 30 tablet, Rfl: 5  •  BinaxNOW COVID-19 Ag Home Test kit, Use as Directed on the Package, Disp: , Rfl:   Past Medical History:   Diagnosis Date   • Allergic    • Arthritis    • Headache    • HL (hearing loss) 3 years ago maybe, not for sure.   • Hyperlipidemia    • Hypertension    • Kidney stone 2021   • Low back pain 2020   • Neuromuscular disorder (HCC) 08/2020    Neuropathy   • Sleep apnea     USES CPAP      Past Surgical History:   Procedure Laterality Date   • BACK SURGERY     • COLONOSCOPY N/A 11/20/2020    Procedure: COLONOSCOPY INTO CECUM;  Surgeon: Jesse Frey MD;  Location: [facility] ENDOSCOPY;  Service: Gastroenterology;  Laterality: N/A;  PRE: SCREENING  POST: HEMORRHOIDS   • CYSTOSCOPY URETEROSCOPY LASER LITHOTRIPSY Left 09/27/2021    Procedure: LEFT URETEROSCOPY LASER LITHOTRIPSY, STONE " "BASKET, POSSIBLE STENT;  Surgeon: Jethro Valentin MD;  Location: [facility] OR;  Service: Urology;  Laterality: Left;   • HAND SURGERY  1977   • LUMBAR EPIDURAL INJECTION N/A 02/17/2022    Procedure: lumbar epidural steroid injection lumbar4-5 or lumbar5-sacral1;  Surgeon: Mitch Acosta MD;  Location: [facility] MAIN OR;  Service: Pain Management;  Laterality: N/A;   • LUMBAR EPIDURAL INJECTION N/A 03/29/2022    Procedure: lumbar epidural steroid injection lumbar5-sacral1;  Surgeon: Mitch Acosta MD;  Location: [facility] MAIN OR;  Service: Pain Management;  Laterality: N/A;   • LUMBAR LAMINECTOMY DISCECTOMY DECOMPRESSION Bilateral 06/28/2021    Procedure: Lumbar laminectomy lumbar 3 lumbar 4 for bilateral decompression;  Surgeon: Bernabe Costello MD;  Location: [facility] MAIN OR;  Service: Neurosurgery;  Laterality: Bilateral;   • SPINE SURGERY  6/28/21    laminectomy   • WISDOM TOOTH EXTRACTION       Family History   Problem Relation Age of Onset   • Arthritis Mother    • Mental illness Mother    • Stroke Father    • Alcohol abuse Father    • No Known Problems Sister    • No Known Problems Brother    • No Known Problems Brother    • No Known Problems Brother    • Malig Hyperthermia Neg Hx      Social History     Socioeconomic History   • Marital status:    Tobacco Use   • Smoking status: Never   • Smokeless tobacco: Never   Vaping Use   • Vaping Use: Never used   Substance and Sexual Activity   • Alcohol use: Not Currently     Comment: very seldom   • Drug use: Never   • Sexual activity: Yes     Partners: Female     Birth control/protection: None        reviewed and updated    Objective   Vital Signs:  /68   Pulse 59   Temp 98.2 °F (36.8 °C)   Resp 16   Ht 185.4 cm (73\")   Wt 133 kg (293 lb 4.8 oz)   SpO2 96%   BMI 38.70 kg/m²   Estimated body mass index is 38.7 kg/m² as calculated from the following:    Height as of this encounter: 185.4 cm (73\").    Weight as of this encounter: 133 kg " (293 lb 4.8 oz).    Physical Exam  Vitals and nursing note reviewed.   Constitutional:       General: He is not in acute distress.     Appearance: Normal appearance. He is not ill-appearing.   HENT:      Head: Normocephalic and atraumatic.      Right Ear: External ear normal.      Left Ear: External ear normal.   Eyes:      General: No scleral icterus.        Right eye: No discharge.         Left eye: No discharge.      Extraocular Movements: Extraocular movements intact.      Conjunctiva/sclera: Conjunctivae normal.      Pupils: Pupils are equal, round, and reactive to light.   Cardiovascular:      Rate and Rhythm: Normal rate and regular rhythm.      Pulses: Normal pulses.      Heart sounds: Normal heart sounds. No murmur heard.  Pulmonary:      Effort: Pulmonary effort is normal.      Breath sounds: Normal breath sounds. No wheezing or rales.   Neurological:      General: No focal deficit present.      Mental Status: He is alert and oriented to person, place, and time. Mental status is at baseline.      Cranial Nerves: No cranial nerve deficit.   Psychiatric:         Mood and Affect: Mood normal.         Behavior: Behavior normal.         Thought Content: Thought content normal.        Result Review :  The following data was reviewed by: Johnathan Riggins MD on 02/02/2023:  Common labs    Common Labs 6/21/22 7/8/22 2/2/23 2/2/23 2/2/23      1120 1120 1120   Glucose   101 (A)     BUN   18     Creatinine   1.15     Sodium   139     Potassium   3.6     Chloride   101     Calcium   9.6     Total Protein 6.9  7.1     Albumin 4.0  4.6     Total Bilirubin   0.7     Alkaline Phosphatase   77     AST (SGOT)   30     ALT (SGPT)   28     WBC  6.1      Hemoglobin  13.9      Hematocrit  41.2      Platelets  234      Total Cholesterol     213 (A)   Triglycerides     146   HDL Cholesterol     40   LDL Cholesterol      147 (A)   Hemoglobin A1C    5.70 (A)    (A) Abnormal value       Comments are available for some flowsheets but are  not being displayed.           Data reviewed: prior office notes reviewd          Assessment and Plan      Wei Salgado is a 57 y.o. male presenting to [facility] care. Main concern with ongoing lower back pain and bilateral lower extremity peripheral neuropathy. Saw neurologist at [facility] [place] and had EMG that apparently showed neuropathy, started on b12 supplementation then was sent to pain management, did not respond to IVIG, did trial of gabapentin which has now become less effective. Extensive lab testing done as well that has been unrevealing. His gabapentin has already been increased to 900mg TID by Dr. Manriquez. Would consider change to alternative agent like lyrica. Will try for [facility] neurologist for second opinion/further evaluation as well. Check labs as below. F/u 1 month.     Diagnoses and all orders for this visit:    1. Peripheral polyneuropathy (Primary)  -     Comprehensive metabolic panel  -     CK  -     Hemoglobin A1c  -     Lipid panel  -     Ambulatory Referral to Neurology           I spent 45 minutes caring for Wei on this date of service. This time includes time spent by me in the following activities:preparing for the visit, reviewing tests, obtaining and/or reviewing a separately obtained history, performing a medically appropriate examination and/or evaluation , counseling and educating the patient/family/caregiver, ordering medications, tests, or procedures and documenting information in the medical record  Follow Up   Return in about 4 weeks (around 3/2/2023) for Recheck.  Patient was given instructions and counseling regarding his condition or for health maintenance advice. Please see specific information pulled into the AVS if appropriate.     Johnathan Riggins MD  [facility] Pediatric[facility]

## 2023-02-10 DIAGNOSIS — G62.9 PERIPHERAL POLYNEUROPATHY: Primary | ICD-10-CM

## 2023-02-10 RX ORDER — PREGABALIN 50 MG/1
50 CAPSULE ORAL 2 TIMES DAILY
Qty: 60 CAPSULE | Refills: 0 | Status: SHIPPED | OUTPATIENT
Start: 2023-02-10 | End: 2023-02-24 | Stop reason: SDUPTHER

## 2023-02-13 ENCOUNTER — OFFICE VISIT (OUTPATIENT)
Dept: INTERNAL MEDICINE | Facility: CLINIC | Age: 58
End: 2023-02-13
Payer: COMMERCIAL

## 2023-02-13 VITALS
HEIGHT: 73 IN | HEART RATE: 72 BPM | RESPIRATION RATE: 16 BRPM | SYSTOLIC BLOOD PRESSURE: 130 MMHG | DIASTOLIC BLOOD PRESSURE: 78 MMHG | WEIGHT: 296.8 LBS | OXYGEN SATURATION: 94 % | BODY MASS INDEX: 39.34 KG/M2 | TEMPERATURE: 98.7 F

## 2023-02-13 DIAGNOSIS — R74.8 ELEVATED CK: Primary | ICD-10-CM

## 2023-02-13 PROCEDURE — 99214 OFFICE O/P EST MOD 30 MIN: CPT | Performed by: INTERNAL MEDICINE

## 2023-02-13 NOTE — PROGRESS NOTES
"Chief Complaint  Results (Lab work f/u and additional labs)    Subjective        Wei Salgado presents to [facility]  History of Present Illness  Here for lab follow up.     Persistently elevated CK level in 500s, has been off statin since last June. Medication list reviewed and no other obvious causes. No intense exercise. Ongoing neuromuscular pain. No prior muscle biopsy. EMG with polyneuropathy diagnosis.   Prior workup unrevealing. Inflammatory markers and ROQUE negative at that time.   Symptoms start post covid infection.     Objective   Vital Signs:  /78   Pulse 72   Temp 98.7 °F (37.1 °C)   Resp 16   Ht 185.4 cm (73\")   Wt 135 kg (296 lb 12.8 oz)   SpO2 94%   BMI 39.16 kg/m²   Estimated body mass index is 39.16 kg/m² as calculated from the following:    Height as of this encounter: 185.4 cm (73\").    Weight as of this encounter: 135 kg (296 lb 12.8 oz).       Physical Exam  Vitals and nursing note reviewed.   Constitutional:       General: He is not in acute distress.     Appearance: Normal appearance. He is not toxic-appearing.   HENT:      Head: Normocephalic and atraumatic.      Right Ear: External ear normal.      Left Ear: External ear normal.      Nose: Nose normal.   Eyes:      General: No scleral icterus.        Right eye: No discharge.         Left eye: No discharge.      Extraocular Movements: Extraocular movements intact.      Conjunctiva/sclera: Conjunctivae normal.      Pupils: Pupils are equal, round, and reactive to light.   Pulmonary:      Effort: Pulmonary effort is normal.   Musculoskeletal:         General: Normal range of motion.   Skin:     General: Skin is warm.      Capillary Refill: Capillary refill takes less than 2 seconds.   Neurological:      General: No focal deficit present.      Mental Status: He is alert and oriented to person, place, and time. Mental status is at baseline.      Cranial Nerves: No cranial nerve " deficit.      Gait: Gait normal.   Psychiatric:         Mood and Affect: Mood normal.         Behavior: Behavior normal.         Thought Content: Thought content normal.         Judgment: Judgment normal.        Result Review :  The following data was reviewed by: Johnathan Riggins MD on 02/13/2023:  Common labs    Common Labs 6/21/22 7/8/22 2/2/23 2/2/23 2/2/23      1120 1120 1120   Glucose   101 (A)     BUN   18     Creatinine   1.15     Sodium   139     Potassium   3.6     Chloride   101     Calcium   9.6     Total Protein 6.9  7.1     Albumin 4.0  4.6     Total Bilirubin   0.7     Alkaline Phosphatase   77     AST (SGOT)   30     ALT (SGPT)   28     WBC  6.1      Hemoglobin  13.9      Hematocrit  41.2      Platelets  234      Total Cholesterol     213 (A)   Triglycerides     146   HDL Cholesterol     40   LDL Cholesterol      147 (A)   Hemoglobin A1C    5.70 (A)    (A) Abnormal value       Comments are available for some flowsheets but are not being displayed.           Data reviewed: prior office notes reviewed             Assessment and Plan      Wei Salgado is a 57 y.o. male presenting for f/u elevated CK level, med list reviewed, off statin for 8months or so at this point, persistently elevated CK level in 500s. No intense exercise, uncertain etiology, recheck today along with labs as below. Would consider muscle biopsy in future if persistently elevated. F/u pending lab results.     Diagnoses and all orders for this visit:    1. Elevated CK (Primary)  -     CK  -     Aldolase  -     Hepatic Function Panel  -     Urinalysis With Microscopic - Urine, Clean Catch  -     Lactate Dehydrogenase  -     C-reactive protein  -     Sedimentation rate, automated  -     ROQUE by IFA, Reflex 9-biomarkers profile           I spent 30 minutes caring for Wei on this date of service. This time includes time spent by me in the following activities:preparing for the visit, reviewing tests, obtaining and/or reviewing a  separately obtained history, performing a medically appropriate examination and/or evaluation , counseling and educating the patient/family/caregiver, ordering medications, tests, or procedures and documenting information in the medical record  Follow Up   Return for Next scheduled follow up.  Patient was given instructions and counseling regarding his condition or for health maintenance advice. Please see specific information pulled into the AVS if appropriate.

## 2023-02-17 LAB
ALBUMIN SERPL-MCNC: 4.4 G/DL (ref 3.5–5.2)
ALDOLASE SERPL-CCNC: 5.2 U/L (ref 3.3–10.3)
ALP SERPL-CCNC: 84 U/L (ref 39–117)
ALT SERPL-CCNC: 24 U/L (ref 1–41)
ANA SER QL IF: NEGATIVE
APPEARANCE UR: CLEAR
AST SERPL-CCNC: 22 U/L (ref 1–40)
BACTERIA #/AREA URNS HPF: NORMAL /HPF
BILIRUB DIRECT SERPL-MCNC: <0.2 MG/DL (ref 0–0.3)
BILIRUB SERPL-MCNC: 0.4 MG/DL (ref 0–1.2)
BILIRUB UR QL STRIP: NEGATIVE
CASTS URNS MICRO: NORMAL
CK SERPL-CCNC: 276 U/L (ref 20–200)
COLOR UR: YELLOW
CRP SERPL-MCNC: <0.3 MG/DL (ref 0–0.5)
EPI CELLS #/AREA URNS HPF: NORMAL /HPF
ERYTHROCYTE [SEDIMENTATION RATE] IN BLOOD BY WESTERGREN METHOD: 5 MM/HR (ref 0–20)
GLUCOSE UR QL STRIP: NEGATIVE
HGB UR QL STRIP: NEGATIVE
KETONES UR QL STRIP: NEGATIVE
LABORATORY COMMENT REPORT: NORMAL
LDH SERPL L TO P-CCNC: 205 U/L (ref 135–225)
LEUKOCYTE ESTERASE UR QL STRIP: NEGATIVE
NITRITE UR QL STRIP: NEGATIVE
PH UR STRIP: 6.5 [PH] (ref 5–8)
PROT SERPL-MCNC: 6.8 G/DL (ref 6–8.5)
PROT UR QL STRIP: NEGATIVE
RBC #/AREA URNS HPF: NORMAL /HPF
SP GR UR STRIP: 1.02 (ref 1–1.03)
UROBILINOGEN UR STRIP-MCNC: NORMAL MG/DL
WBC #/AREA URNS HPF: NORMAL /HPF

## 2023-02-24 DIAGNOSIS — G62.9 PERIPHERAL POLYNEUROPATHY: ICD-10-CM

## 2023-02-24 RX ORDER — PREGABALIN 50 MG/1
50 CAPSULE ORAL 2 TIMES DAILY
Qty: 60 CAPSULE | Refills: 0 | Status: SHIPPED | OUTPATIENT
Start: 2023-02-24 | End: 2023-03-23 | Stop reason: SDUPTHER

## 2023-03-23 DIAGNOSIS — G62.9 PERIPHERAL POLYNEUROPATHY: ICD-10-CM

## 2023-03-23 RX ORDER — COVID-19 MOLECULAR TEST ASSAY
1 KIT MISCELLANEOUS AS NEEDED
Qty: 1 KIT | Refills: 3 | Status: SHIPPED | OUTPATIENT
Start: 2023-03-23

## 2023-03-23 RX ORDER — PREGABALIN 50 MG/1
50 CAPSULE ORAL 2 TIMES DAILY
Qty: 60 CAPSULE | Refills: 0 | Status: SHIPPED | OUTPATIENT
Start: 2023-03-23 | End: 2023-03-28

## 2023-03-23 RX ORDER — FLUTICASONE PROPIONATE 50 MCG
2 SPRAY, SUSPENSION (ML) NASAL DAILY
Qty: 16 G | Refills: 11 | Status: SHIPPED | OUTPATIENT
Start: 2023-03-23

## 2023-03-23 NOTE — TELEPHONE ENCOUNTER
Rx Refill Note  Requested Prescriptions     Pending Prescriptions Disp Refills   • fluticasone (FLONASE) 50 MCG/ACT nasal spray       Si sprays into the nostril(s) as directed by provider Daily.   • BinaxNOW COVID-19 Ag Home Test kit     • pregabalin (Lyrica) 50 MG capsule 60 capsule 0     Sig: Take 1 capsule by mouth 2 (Two) Times a Day.      Last office visit with prescribing clinician: 2023   Last telemedicine visit with prescribing clinician: Visit date not found   Next office visit with prescribing clinician: Visit date not found                         Would you like a call back once the refill request has been completed: [] Yes [] No    If the office needs to give you a call back, can they leave a voicemail: [] Yes [] No    Jayne Mckenzie MA  23, 08:54 EDT

## 2023-03-27 ENCOUNTER — TELEPHONE (OUTPATIENT)
Dept: INTERNAL MEDICINE | Facility: CLINIC | Age: 58
End: 2023-03-27
Payer: COMMERCIAL

## 2023-03-27 NOTE — TELEPHONE ENCOUNTER
[facility] sent a fax letting us know that they denied this patient's Pregabalin        50 MG.  Not sure what you want to do.    Thank you

## 2023-03-28 DIAGNOSIS — G62.9 PERIPHERAL POLYNEUROPATHY: Primary | ICD-10-CM

## 2023-03-28 RX ORDER — PREGABALIN 75 MG/1
75 CAPSULE ORAL 2 TIMES DAILY
Qty: 60 CAPSULE | Refills: 0 | Status: SHIPPED | OUTPATIENT
Start: 2023-03-28

## 2023-03-28 NOTE — TELEPHONE ENCOUNTER
The patient sent me a message stating that the Gabapentin didn't work for him previously.  He's going to call around to different pharmacies and see how much it would be if he used [facility]. After that, he'll let us know what he wants to do.    Thank you

## 2023-04-26 DIAGNOSIS — G62.9 PERIPHERAL POLYNEUROPATHY: ICD-10-CM

## 2023-04-26 RX ORDER — PREGABALIN 75 MG/1
75 CAPSULE ORAL 2 TIMES DAILY
Qty: 60 CAPSULE | Refills: 0 | Status: SHIPPED | OUTPATIENT
Start: 2023-04-26

## 2023-05-09 ENCOUNTER — LAB (OUTPATIENT)
Dept: LAB | Facility: HOSPITAL | Age: 58
End: 2023-05-09
Payer: COMMERCIAL

## 2023-05-09 ENCOUNTER — OFFICE VISIT (OUTPATIENT)
Dept: NEUROLOGY | Facility: CLINIC | Age: 58
End: 2023-05-09
Payer: COMMERCIAL

## 2023-05-09 VITALS
OXYGEN SATURATION: 95 % | HEIGHT: 73 IN | SYSTOLIC BLOOD PRESSURE: 138 MMHG | HEART RATE: 79 BPM | DIASTOLIC BLOOD PRESSURE: 80 MMHG | WEIGHT: 287 LBS | BODY MASS INDEX: 38.04 KG/M2

## 2023-05-09 DIAGNOSIS — G62.9 POLYNEUROPATHY: Primary | ICD-10-CM

## 2023-05-09 DIAGNOSIS — G62.9 POLYNEUROPATHY: ICD-10-CM

## 2023-05-09 LAB
HCYS SERPL-MCNC: 7.5 UMOL/L (ref 0–15)
VIT B12 BLD-MCNC: 1058 PG/ML (ref 211–946)

## 2023-05-09 PROCEDURE — 83825 ASSAY OF MERCURY: CPT | Performed by: PSYCHIATRY & NEUROLOGY

## 2023-05-09 PROCEDURE — 99205 OFFICE O/P NEW HI 60 MIN: CPT | Performed by: PSYCHIATRY & NEUROLOGY

## 2023-05-09 PROCEDURE — 82175 ASSAY OF ARSENIC: CPT | Performed by: PSYCHIATRY & NEUROLOGY

## 2023-05-09 PROCEDURE — 83655 ASSAY OF LEAD: CPT | Performed by: PSYCHIATRY & NEUROLOGY

## 2023-05-09 PROCEDURE — 82784 ASSAY IGA/IGD/IGG/IGM EACH: CPT

## 2023-05-09 PROCEDURE — 86334 IMMUNOFIX E-PHORESIS SERUM: CPT

## 2023-05-09 PROCEDURE — 82607 VITAMIN B-12: CPT

## 2023-05-09 PROCEDURE — 83921 ORGANIC ACID SINGLE QUANT: CPT

## 2023-05-09 PROCEDURE — 36415 COLL VENOUS BLD VENIPUNCTURE: CPT

## 2023-05-09 PROCEDURE — 84165 PROTEIN E-PHORESIS SERUM: CPT | Performed by: PSYCHIATRY & NEUROLOGY

## 2023-05-09 PROCEDURE — 1160F RVW MEDS BY RX/DR IN RCRD: CPT | Performed by: PSYCHIATRY & NEUROLOGY

## 2023-05-09 PROCEDURE — 1159F MED LIST DOCD IN RCRD: CPT | Performed by: PSYCHIATRY & NEUROLOGY

## 2023-05-09 PROCEDURE — 84155 ASSAY OF PROTEIN SERUM: CPT | Performed by: PSYCHIATRY & NEUROLOGY

## 2023-05-09 PROCEDURE — 83090 ASSAY OF HOMOCYSTEINE: CPT

## 2023-05-09 RX ORDER — PREGABALIN 100 MG/1
100 CAPSULE ORAL 2 TIMES DAILY
Qty: 60 CAPSULE | Refills: 2 | Status: SHIPPED | OUTPATIENT
Start: 2023-05-09 | End: 2024-05-08

## 2023-05-09 NOTE — PROGRESS NOTES
Notes by MA:  Patient has been referred to our office for BLE neuropathy. Patient sts he has had issues with his legs for almost 3 years now. Patient is currently taking Lyrica 75 mg BID.        Subjective:     Dear Dr. Riggins, I had the pleasure of seeing Mr. Salgado in consultation today.  As you know, he is a 57-year-old right-handed gentleman who began to have sensory difficulties in his feet and August 2020.  This has slightly progressed over the intervening 3 years and he is beginning to have a little bit of balance issues and difficulty with pain in his feet and altered sensation in his feet along with known chronic low back pain for which she has undergone lumbar surgery.  He has had several work-ups including 2 EMG and nerve conduction studies, both of which suggest a polyneuropathy.  He also has longstanding L5-S1 radiculopathy (on the left).  Given the possibility that he had CIDP he had a trial of IVIG which failed.  He was initially on gabapentin but this no longer helps him.  He has been transitioned over to Lyrica and titrated to 75 mg twice daily.  He feels like this helps a little bit.  No problematic side effects.  He has been taking oral vitamin B12.  Repeat levels have not been performed.  He has been checking his sugar more recently and have asked him to share this data with you.  There is no family history of polyneuropathy.  He denies any history of diabetes although his A1c was borderline at 5.7 and he does have some significant blood sugar spikes on his recent testing.  He denies any alcohol use/abuse and has never been an alcoholic.  Patient ID: Wei Salgado is a 57 y.o. male.    History of Present Illness  The following portions of the patient's history were reviewed and updated as appropriate: allergies, current medications, past family history, past medical history, past social history, past surgical history and problem list.    Review of Systems   Constitutional: Positive for fatigue.  Negative for activity change and appetite change.   HENT: Negative for facial swelling and trouble swallowing.    Eyes: Negative for photophobia, pain and visual disturbance.   Respiratory: Negative for chest tightness, shortness of breath and wheezing.    Cardiovascular: Negative for chest pain, palpitations and leg swelling.   Gastrointestinal: Negative for abdominal pain, nausea and vomiting.   Musculoskeletal: Positive for back pain (lower back) and gait problem. Negative for arthralgias, joint swelling, myalgias, neck pain and neck stiffness.   Neurological: Positive for weakness (BLE) and numbness (BLE). Negative for dizziness, tremors, seizures, syncope, facial asymmetry, speech difficulty, light-headedness and headaches.   Hematological: Does not bruise/bleed easily.   Psychiatric/Behavioral: Negative for agitation, behavioral problems, confusion, decreased concentration, dysphoric mood, hallucinations, self-injury, sleep disturbance and suicidal ideas. The patient is not nervous/anxious and is not hyperactive.         Objective:    Neurologic Exam  Awake alert pleasant cooperative oriented in all spheres and socially appropriate.  Speech and language functions are well-preserved.    Cranial nerves II through XII normal and symmetric.    Motor exam reveals reasonable and symmetric tone bulk and power throughout, even distally.  No drift.  No spasticity.  No lateralization.    Sensory exam reveals a mild length dependent decrease in temperature sensation to about mid calf to knee level bilaterally and symmetrically.  He has minimal reduction in vibration sensation at the level of the toes.  He does have some proprioceptive difficulties bilaterally.    Coordination testing reveals smooth and accurate finger-nose-finger normal rapid alternating movements.  Romberg testing is positive.  Gait is slightly wide-based but with no spastic features or lateralizing features.    Tendon reflexes are 1+ and symmetric in  the arms, absent at the right knee and 1+ at the left knee.  Absent at both ankles.  Plantar signs are equivocal bilaterally.  Physical Exam  Obese.  Neck is supple.  No cervical bruits.  Assessment/Plan:     Diagnoses and all orders for this visit:    1. Polyneuropathy (Primary)  -     Vitamin B12; Future  -     Methylmalonic Acid, Serum; Future  -     Homocysteine; Future  -     Protein Elec + Interp, Serum  -     Immunofixation, Serum; Future  -     Heavy Metals, Blood  -     pregabalin (Lyrica) 100 MG capsule; Take 1 capsule by mouth 2 (Two) Times a Day.  Dispense: 60 capsule; Refill: 2     57-year-old gentleman with idiopathic peripheral polyneuropathy.  He has had a number of work-ups by other providers which I reviewed with him in detail.  We had a long discussion about idiopathic polyneuropathy, its natural history, what to expect from the future.  He has already appropriately been through balance and physical therapy and we may need to address this again in the future as this progresses.  I taken the liberty of rechecking his B12 and associated factors as well as a heavy metal screen, serum protein electrophoresis and immunofixation.  I am increasing his Lyrica to 100 mg twice daily to help symptomatically.  We will see him back in about 3 months time to check on his progress but he is encouraged to call in the interim with any problems or questions.  Thank you very much for the opportunity to participate in the care of this pleasant gentleman.    65 minutes patient care time today, including record review, examination and consultation, , and documentation.

## 2023-05-10 LAB
ALBUMIN SERPL ELPH-MCNC: 3.7 G/DL (ref 2.9–4.4)
ALBUMIN/GLOB SERPL: 1.3 {RATIO} (ref 0.7–1.7)
ALPHA1 GLOB SERPL ELPH-MCNC: 0.2 G/DL (ref 0–0.4)
ALPHA2 GLOB SERPL ELPH-MCNC: 0.7 G/DL (ref 0.4–1)
B-GLOBULIN SERPL ELPH-MCNC: 1.1 G/DL (ref 0.7–1.3)
GAMMA GLOB SERPL ELPH-MCNC: 0.9 G/DL (ref 0.4–1.8)
GLOBULIN SER CALC-MCNC: 2.9 G/DL (ref 2.2–3.9)
IGA SERPL-MCNC: 236 MG/DL (ref 90–386)
IGG SERPL-MCNC: 1039 MG/DL (ref 603–1613)
IGM SERPL-MCNC: 71 MG/DL (ref 20–172)
LABORATORY COMMENT REPORT: NORMAL
M PROTEIN SERPL ELPH-MCNC: NORMAL G/DL
PROT PATTERN SERPL ELPH-IMP: NORMAL
PROT PATTERN SERPL IFE-IMP: NORMAL
PROT SERPL-MCNC: 6.6 G/DL (ref 6–8.5)

## 2023-05-12 ENCOUNTER — TELEPHONE (OUTPATIENT)
Dept: NEUROLOGY | Facility: CLINIC | Age: 58
End: 2023-05-12
Payer: COMMERCIAL

## 2023-05-12 NOTE — TELEPHONE ENCOUNTER
----- Message from Nick Coppola MD sent at 5/12/2023 12:27 PM EDT -----  Labs all look good.  No changes are necessary.

## 2023-05-13 LAB
ARSENIC BLD-MCNC: 2 UG/L (ref 0–9)
LEAD BLDV-MCNC: <1 UG/DL (ref 0–3.4)
MERCURY BLD-MCNC: <1 UG/L (ref 0–14.9)

## 2023-05-14 LAB — METHYLMALONATE SERPL-SCNC: 89 NMOL/L (ref 0–378)

## 2023-05-16 ENCOUNTER — PATIENT ROUNDING (BHMG ONLY) (OUTPATIENT)
Dept: NEUROLOGY | Facility: CLINIC | Age: 58
End: 2023-05-16
Payer: COMMERCIAL

## 2023-05-23 DIAGNOSIS — S99.912S INJURY OF LEFT ANKLE, SEQUELA: Primary | ICD-10-CM

## 2023-06-05 DIAGNOSIS — G62.9 POLYNEUROPATHY: ICD-10-CM

## 2023-06-05 RX ORDER — PREGABALIN 100 MG/1
100 CAPSULE ORAL 2 TIMES DAILY
Qty: 60 CAPSULE | Refills: 2 | Status: SHIPPED | OUTPATIENT
Start: 2023-06-05 | End: 2024-06-04

## 2023-06-05 RX ORDER — FLUTICASONE PROPIONATE 50 MCG
2 SPRAY, SUSPENSION (ML) NASAL DAILY
Qty: 16 G | Refills: 11 | Status: SHIPPED | OUTPATIENT
Start: 2023-06-05

## 2023-06-05 NOTE — TELEPHONE ENCOUNTER
Rx Refill Note  Requested Prescriptions     Pending Prescriptions Disp Refills    fluticasone (FLONASE) 50 MCG/ACT nasal spray 16 g 11     Si sprays into the nostril(s) as directed by provider Daily.      Last office visit with prescribing clinician: 2023   Last telemedicine visit with prescribing clinician: Visit date not found   Next office visit with prescribing clinician: Visit date not found                         Would you like a call back once the refill request has been completed: [] Yes [] No    If the office needs to give you a call back, can they leave a voicemail: [] Yes [] No    Mallory Marrero MA  23, 09:11 EDT

## 2023-07-31 ENCOUNTER — HOSPITAL ENCOUNTER (OUTPATIENT)
Dept: MRI IMAGING | Facility: HOSPITAL | Age: 58
Discharge: HOME | End: 2023-07-31
Admitting: INTERNAL MEDICINE
Payer: COMMERCIAL

## 2023-07-31 DIAGNOSIS — S96.912A TEAR OF TENDON OF LEFT ANKLE, INITIAL ENCOUNTER: ICD-10-CM

## 2023-07-31 PROCEDURE — 73721 MRI JNT OF LWR EXTRE W/O DYE: CPT

## 2023-08-03 DIAGNOSIS — G62.9 POLYNEUROPATHY: ICD-10-CM

## 2023-08-04 RX ORDER — FLUTICASONE PROPIONATE 50 MCG
2 SPRAY, SUSPENSION (ML) NASAL DAILY
Qty: 16 G | Refills: 11 | Status: SHIPPED | OUTPATIENT
Start: 2023-08-04

## 2023-08-04 RX ORDER — PREGABALIN 100 MG/1
100 CAPSULE ORAL 2 TIMES DAILY
Qty: 60 CAPSULE | Refills: 2 | Status: SHIPPED | OUTPATIENT
Start: 2023-08-04 | End: 2024-08-03

## 2023-08-08 ENCOUNTER — OFFICE VISIT (OUTPATIENT)
Dept: NEUROLOGY | Facility: CLINIC | Age: 58
End: 2023-08-08
Payer: COMMERCIAL

## 2023-08-08 VITALS
BODY MASS INDEX: 38.3 KG/M2 | SYSTOLIC BLOOD PRESSURE: 120 MMHG | WEIGHT: 289 LBS | DIASTOLIC BLOOD PRESSURE: 74 MMHG | HEIGHT: 73 IN | OXYGEN SATURATION: 98 % | HEART RATE: 72 BPM

## 2023-08-08 DIAGNOSIS — G62.9 POLYNEUROPATHY: Primary | ICD-10-CM

## 2023-08-08 PROCEDURE — 1160F RVW MEDS BY RX/DR IN RCRD: CPT | Performed by: PSYCHIATRY & NEUROLOGY

## 2023-08-08 PROCEDURE — 1159F MED LIST DOCD IN RCRD: CPT | Performed by: PSYCHIATRY & NEUROLOGY

## 2023-08-08 PROCEDURE — 99213 OFFICE O/P EST LOW 20 MIN: CPT | Performed by: PSYCHIATRY & NEUROLOGY

## 2023-08-08 NOTE — PROGRESS NOTES
"Notes by MA:  Patient presents for polyneuropathy follow up. Patient reports some improvement with legs since increased lyrica 100 mg BID. Patient reports some \"prickly\" feeling in toes, mostly right foot, but at times also the left foot.       Subjective:     Patient ID: Wei Salgado is a 58 y.o. male.    Peripheral Neuropathy  Pertinent negatives include no headaches, numbness or weakness.   The following portions of the patient's history were reviewed and updated as appropriate: allergies, current medications, past family history, past medical history, past social history, past surgical history, and problem list.    Review of Systems   Neurological:  Negative for dizziness, tremors, seizures, syncope, facial asymmetry, speech difficulty, weakness, light-headedness, numbness and headaches.   Psychiatric/Behavioral:  Negative for agitation, behavioral problems, confusion, decreased concentration, dysphoric mood, hallucinations, self-injury, sleep disturbance and suicidal ideas. The patient is not nervous/anxious and is not hyperactive.       Objective:    Neurologic Exam  Awake alert pleasant cooperative oriented in all spheres and socially appropriate.  Speech and language functions are well-preserved.     Cranial nerves II through XII normal and symmetric.     Motor exam reveals reasonable and symmetric tone bulk and power throughout, even distally.  No drift.  No spasticity.  No lateralization.     Sensory exam reveals a mild length dependent decrease in temperature sensation to about mid calf to knee level bilaterally and symmetrically.  He has minimal reduction in vibration sensation at the level of the toes.  He does have some proprioceptive difficulties bilaterally.     Coordination testing reveals smooth and accurate finger-nose-finger normal rapid alternating movements.  Romberg testing is positive.  Gait is slightly wide-based but with no spastic features or lateralizing features.     Tendon reflexes " are 1+ and symmetric in the arms, absent at the right knee and 1+ at the left knee.  Absent at both ankles.  Plantar signs are equivocal bilaterally.  Physical Exam    Assessment/Plan:     Diagnoses and all orders for this visit:    1. Polyneuropathy (Primary)     Idiopathic peripheral polyneuropathy.  I reviewed his work-up since her last visit which revealed normal heavy metal screen, normal serum protein electrophoresis and normal vitamin B12 panels including methylmalonic acid and homocystine.  He is symptomatically improved on Lyrica currently at 100 mg twice daily.  He is tolerating this well without any problematic side effects or toxic findings on exam.  No changes are indicated at this time.  We would be happy to see him back at any time.

## 2023-09-11 ENCOUNTER — OFFICE VISIT (OUTPATIENT)
Dept: INTERNAL MEDICINE | Facility: CLINIC | Age: 58
End: 2023-09-11
Payer: COMMERCIAL

## 2023-09-11 VITALS
HEART RATE: 68 BPM | HEIGHT: 73 IN | TEMPERATURE: 97.7 F | RESPIRATION RATE: 16 BRPM | OXYGEN SATURATION: 97 % | DIASTOLIC BLOOD PRESSURE: 86 MMHG | BODY MASS INDEX: 38.3 KG/M2 | WEIGHT: 289 LBS | SYSTOLIC BLOOD PRESSURE: 138 MMHG

## 2023-09-11 DIAGNOSIS — R19.09 ABDOMINAL MASS OF OTHER SITE: Primary | ICD-10-CM

## 2023-09-11 DIAGNOSIS — G89.29 CHRONIC LEFT-SIDED LOW BACK PAIN WITHOUT SCIATICA: ICD-10-CM

## 2023-09-11 DIAGNOSIS — M54.50 CHRONIC LEFT-SIDED LOW BACK PAIN WITHOUT SCIATICA: ICD-10-CM

## 2023-09-11 PROCEDURE — 99214 OFFICE O/P EST MOD 30 MIN: CPT | Performed by: INTERNAL MEDICINE

## 2023-09-11 RX ORDER — NALOXONE HYDROCHLORIDE 4 MG/.1ML
SPRAY NASAL
COMMUNITY
Start: 2023-08-13

## 2023-09-11 RX ORDER — CYCLOBENZAPRINE HCL 10 MG
10 TABLET ORAL 3 TIMES DAILY PRN
Qty: 30 TABLET | Refills: 0 | Status: SHIPPED | OUTPATIENT
Start: 2023-09-11

## 2023-09-12 DIAGNOSIS — G62.9 POLYNEUROPATHY: ICD-10-CM

## 2023-09-13 RX ORDER — FLUTICASONE PROPIONATE 50 MCG
2 SPRAY, SUSPENSION (ML) NASAL DAILY
Qty: 16 G | Refills: 11 | Status: SHIPPED | OUTPATIENT
Start: 2023-09-13

## 2023-09-13 RX ORDER — PREGABALIN 100 MG/1
100 CAPSULE ORAL 2 TIMES DAILY
Qty: 60 CAPSULE | Refills: 2 | Status: SHIPPED | OUTPATIENT
Start: 2023-09-13 | End: 2024-09-12

## 2023-09-13 NOTE — PROGRESS NOTES
"Chief Complaint  Mass (Possible hernia) and Back Pain (Back pain mainly left side while sitting)    Subjective        Wei Salgado presents to [facility]  History of Present Illness  Here with concern for abdominal mass, bulges out midline when laying flat, no pain, no b symptoms, no recent trauma  L sided back pain worse while sitting, better with standing, some tightening sensation    Objective   Vital Signs:  /86   Pulse 68   Temp 97.7 °F (36.5 °C)   Resp 16   Ht 185.4 cm (73\")   Wt 131 kg (289 lb)   SpO2 97%   BMI 38.13 kg/m²   Estimated body mass index is 38.13 kg/m² as calculated from the following:    Height as of this encounter: 185.4 cm (73\").    Weight as of this encounter: 131 kg (289 lb).       Physical Exam  Vitals and nursing note reviewed.   Constitutional:       General: He is not in acute distress.     Appearance: Normal appearance. He is not toxic-appearing.   HENT:      Head: Normocephalic and atraumatic.      Right Ear: External ear normal.      Left Ear: External ear normal.      Nose: Nose normal.   Eyes:      General: No scleral icterus.        Right eye: No discharge.         Left eye: No discharge.      Extraocular Movements: Extraocular movements intact.      Conjunctiva/sclera: Conjunctivae normal.      Pupils: Pupils are equal, round, and reactive to light.   Cardiovascular:      Rate and Rhythm: Normal rate and regular rhythm.      Pulses: Normal pulses.      Heart sounds: Normal heart sounds. No murmur heard.  Pulmonary:      Effort: Pulmonary effort is normal.   Abdominal:      General: Bowel sounds are normal.      Palpations: Abdomen is soft. There is mass.   Musculoskeletal:         General: Normal range of motion.   Skin:     General: Skin is warm.      Capillary Refill: Capillary refill takes less than 2 seconds.   Neurological:      General: No focal deficit present.      Mental Status: He is alert and oriented to " person, place, and time. Mental status is at baseline.      Cranial Nerves: No cranial nerve deficit.      Gait: Gait normal.   Psychiatric:         Mood and Affect: Mood normal.         Behavior: Behavior normal.         Thought Content: Thought content normal.         Judgment: Judgment normal.      Result Review :  The following data was reviewed by: Johnathan Riggins MD on 09/11/2023:  Common labs          2/2/2023    11:20 2/13/2023    08:44 5/9/2023    10:06   Common Labs   Glucose 101      BUN 18      Creatinine 1.15      Sodium 139      Potassium 3.6      Chloride 101      Calcium 9.6      Total Protein 7.1  6.8  6.6    Albumin 4.6  4.4  3.7    Total Bilirubin 0.7  0.4     Alkaline Phosphatase 77  84     AST (SGOT) 30  22     ALT (SGPT) 28  24     Total Cholesterol 213      Triglycerides 146      HDL Cholesterol 40      LDL Cholesterol  147      Hemoglobin A1C 5.70        Data reviewed : prior office notes reviewed             Assessment and Plan   Diagnoses and all orders for this visit:    1. Abdominal mass of other site (Primary)  -     CT Abdomen Pelvis With & Without Contrast; Future  - seems more like increased abdominal musculature, clear bulge when laying flat in the midline, plan for CT to further evaluate    2. Chronic left-sided low back pain without sciatica  -     cyclobenzaprine (FLEXERIL) 10 MG tablet; Take 1 tablet by mouth 3 (Three) Times a Day As Needed for Muscle Spasms.  Dispense: 30 tablet; Refill: 0  - positional and intermittent, trial flexeril for muscle spasms         I spent 20 minutes caring for Wei on this date of service. This time includes time spent by me in the following activities:preparing for the visit, reviewing tests, obtaining and/or reviewing a separately obtained history, performing a medically appropriate examination and/or evaluation , counseling and educating the patient/family/caregiver, ordering medications, tests, or procedures, and documenting information in the  medical record  Follow Up   Return in about 4 weeks (around 10/9/2023).  Patient was given instructions and counseling regarding his condition or for health maintenance advice. Please see specific information pulled into the AVS if appropriate.     Johnathan Riggins MD  [facility]  [address] [place] [address]  Phone: [phone]

## 2023-09-20 ENCOUNTER — TELEPHONE (OUTPATIENT)
Dept: INTERNAL MEDICINE | Facility: CLINIC | Age: 58
End: 2023-09-20

## 2023-09-20 NOTE — TELEPHONE ENCOUNTER
Caller: RACHEL    Relationship:     Best call back number: [phone]     Who are you requesting to speak with (clinical staff, provider,  specific staff member): DR. PAZ OR MA    What was the call regarding: CALLER STATED THEY NEED TO SPEAK WITH SOMEONE ABOUT THE AUTHORIZATION ON THE PATIENTS FILE FOR THE APPOINTMENT ON 9/21/23 FOR A CT OF THE ABDOMIN. STATED THEY WOULD NEED TO KNOW SOMETHING BEFORE 12PM TODAY TO BE ABLE TO REACH OUT TO THE PATIENT IF HE NEEDS TO RESCHEDULE. PLEASE CALL WHEN AVAILABLE

## 2023-09-20 NOTE — TELEPHONE ENCOUNTER
I s/w Magi and let her know that it's okay to move this patient's CT a few days out due to waiting on insurance approval.    Thank you

## 2023-09-28 ENCOUNTER — HOSPITAL ENCOUNTER (OUTPATIENT)
Dept: CT IMAGING | Facility: HOSPITAL | Age: 58
Discharge: HOME | End: 2023-09-28
Admitting: INTERNAL MEDICINE
Payer: COMMERCIAL

## 2023-09-28 DIAGNOSIS — R19.09 ABDOMINAL MASS OF OTHER SITE: ICD-10-CM

## 2023-09-28 PROCEDURE — 74176 CT ABD & PELVIS W/O CONTRAST: CPT

## 2023-12-22 DIAGNOSIS — I10 ESSENTIAL HYPERTENSION: ICD-10-CM

## 2023-12-22 RX ORDER — LOSARTAN POTASSIUM AND HYDROCHLOROTHIAZIDE 25; 100 MG/1; MG/1
1 TABLET ORAL DAILY
Qty: 90 TABLET | Refills: 0 | Status: SHIPPED | OUTPATIENT
Start: 2023-12-22

## 2023-12-22 NOTE — TELEPHONE ENCOUNTER
Rx Refill Note  Requested Prescriptions     Pending Prescriptions Disp Refills    losartan-hydrochlorothiazide (HYZAAR) 100-25 MG per tablet [Pharmacy Med Name: Losartan Potassium-HCTZ 100-25 MG Oral Tablet] 90 tablet 0     Sig: Take 1 tablet by mouth once daily      Last office visit with prescribing clinician: 9/11/2023   Last telemedicine visit with prescribing clinician: Visit date not found   Next office visit with prescribing clinician: Visit date not found                         Would you like a call back once the refill request has been completed: [] Yes [] No    If the office needs to give you a call back, can they leave a voicemail: [] Yes [] No    Jayne Mckenzie MA  12/22/23, 11:31 EST

## 2024-01-08 ENCOUNTER — OFFICE VISIT (OUTPATIENT)
Dept: INTERNAL MEDICINE | Facility: CLINIC | Age: 59
End: 2024-01-08
Payer: COMMERCIAL

## 2024-01-08 VITALS
RESPIRATION RATE: 16 BRPM | OXYGEN SATURATION: 97 % | SYSTOLIC BLOOD PRESSURE: 136 MMHG | HEIGHT: 73 IN | TEMPERATURE: 98.3 F | BODY MASS INDEX: 39.18 KG/M2 | WEIGHT: 295.6 LBS | DIASTOLIC BLOOD PRESSURE: 70 MMHG | HEART RATE: 67 BPM

## 2024-01-08 DIAGNOSIS — R42 DIZZINESS: Primary | ICD-10-CM

## 2024-01-08 DIAGNOSIS — Z12.5 PROSTATE CANCER SCREENING: ICD-10-CM

## 2024-01-08 DIAGNOSIS — E78.5 HYPERLIPIDEMIA, UNSPECIFIED HYPERLIPIDEMIA TYPE: ICD-10-CM

## 2024-01-08 PROCEDURE — 1159F MED LIST DOCD IN RCRD: CPT | Performed by: INTERNAL MEDICINE

## 2024-01-08 PROCEDURE — 93000 ELECTROCARDIOGRAM COMPLETE: CPT | Performed by: INTERNAL MEDICINE

## 2024-01-08 PROCEDURE — 1160F RVW MEDS BY RX/DR IN RCRD: CPT | Performed by: INTERNAL MEDICINE

## 2024-01-08 PROCEDURE — 99214 OFFICE O/P EST MOD 30 MIN: CPT | Performed by: INTERNAL MEDICINE

## 2024-01-08 RX ORDER — FLUTICASONE PROPIONATE 50 MCG
2 SPRAY, SUSPENSION (ML) NASAL DAILY
COMMUNITY

## 2024-01-08 NOTE — PROGRESS NOTES
"Chief Complaint  Dizziness (Dizziness on and off for about a month)    Subjective        Wei Salgado presents to [facility]  History of Present Illness  Here with intermittent episodes of dizziness and lightheadedness  Some intermittent ear fullness  No chest pain or shortness of breath  No recent illness  No vomiting  He cannot really explain the symptoms, not worse with head movements  Does not have great water intake    Objective   Vital Signs:  /70   Pulse 67   Temp 98.3 °F (36.8 °C)   Resp 16   Ht 185.4 cm (73\")   Wt 134 kg (295 lb 9.6 oz)   SpO2 97%   BMI 39.00 kg/m²   Estimated body mass index is 39 kg/m² as calculated from the following:    Height as of this encounter: 185.4 cm (73\").    Weight as of this encounter: 134 kg (295 lb 9.6 oz).       Physical Exam  Vitals and nursing note reviewed.   Constitutional:       General: He is not in acute distress.     Appearance: Normal appearance. He is not toxic-appearing.   HENT:      Head: Normocephalic and atraumatic.      Right Ear: Tympanic membrane, ear canal and external ear normal.      Left Ear: Tympanic membrane, ear canal and external ear normal.      Nose: Nose normal.      Mouth/Throat:      Mouth: Mucous membranes are moist.      Pharynx: No oropharyngeal exudate or posterior oropharyngeal erythema.   Eyes:      General: No scleral icterus.        Right eye: No discharge.         Left eye: No discharge.      Extraocular Movements: Extraocular movements intact.      Conjunctiva/sclera: Conjunctivae normal.      Pupils: Pupils are equal, round, and reactive to light.   Cardiovascular:      Rate and Rhythm: Normal rate and regular rhythm.      Pulses: Normal pulses.      Heart sounds: Normal heart sounds. No murmur heard.  Pulmonary:      Effort: Pulmonary effort is normal.      Breath sounds: No wheezing or rales.   Musculoskeletal:         General: Normal range of motion.   Skin:     " General: Skin is warm.      Capillary Refill: Capillary refill takes less than 2 seconds.   Neurological:      General: No focal deficit present.      Mental Status: He is alert and oriented to person, place, and time. Mental status is at baseline.      Cranial Nerves: No cranial nerve deficit.      Gait: Gait normal.   Psychiatric:         Mood and Affect: Mood normal.         Behavior: Behavior normal.         Thought Content: Thought content normal.         Judgment: Judgment normal.        Result Review :  The following data was reviewed by: Johnathan Riggins MD on 01/08/2024:  Common labs          2/2/2023    11:20 2/13/2023    08:44 5/9/2023    10:06   Common Labs   Glucose 101      BUN 18      Creatinine 1.15      Sodium 139      Potassium 3.6      Chloride 101      Calcium 9.6      Total Protein 7.1  6.8  6.6    Albumin 4.6  4.4  3.7    Total Bilirubin 0.7  0.4     Alkaline Phosphatase 77  84     AST (SGOT) 30  22     ALT (SGPT) 28  24     Total Cholesterol 213      Triglycerides 146      HDL Cholesterol 40      LDL Cholesterol  147      Hemoglobin A1C 5.70        Data reviewed : prior office notes reviewed        ECG 12 Lead    Date/Time: 1/8/2024 10:00 AM  Performed by: Johnathan Riggins MD    Authorized by: Johnathan Riggins MD  Comparison: not compared with previous ECG   Rhythm: sinus bradycardia  Rate: normal  Conduction: conduction normal  ST Segments: ST segments normal  T Waves: T waves normal  QRS axis: normal  Other: no other findings  Comments: Sinus sybil with sinus arrythmia, HR 55, otherwise unremarkable            Assessment and Plan   Diagnoses and all orders for this visit:    1. Dizziness (Primary)  -     Comprehensive Metabolic Panel  -     Hemoglobin A1c  -     CBC & Differential  -     TSH  -     Ferritin  -     Iron Profile  -     Vitamin B12  -     ROQUE  -     ECG 12 Lead    2. Hyperlipidemia, unspecified hyperlipidemia type  -     Lipid panel    3. Prostate cancer screening  -     PSA  SCREENING    Nonspecific dizziness - EKG unrevealing, check labs, he is not having episode currently, encouraged good water intake, monitor BP to see if that is contributing.        I spent 20 minutes caring for Wei on this date of service. This time includes time spent by me in the following activities:preparing for the visit, reviewing tests, obtaining and/or reviewing a separately obtained history, performing a medically appropriate examination and/or evaluation , counseling and educating the patient/family/caregiver, ordering medications, tests, or procedures, and documenting information in the medical record  Follow Up   Return for Next scheduled follow up.  Patient was given instructions and counseling regarding his condition or for health maintenance advice. Please see specific information pulled into the AVS if appropriate.     Johnathan Riggins MD  [facility] [place] [facility]  [address]  Phone: [phone]

## 2024-01-09 LAB
ALBUMIN SERPL-MCNC: 4.2 G/DL (ref 3.8–4.9)
ALBUMIN/GLOB SERPL: 1.8 {RATIO} (ref 1.2–2.2)
ALP SERPL-CCNC: 80 IU/L (ref 44–121)
ALT SERPL-CCNC: 22 IU/L (ref 0–44)
ANA SER QL: NEGATIVE
AST SERPL-CCNC: 19 IU/L (ref 0–40)
BASOPHILS # BLD AUTO: 0.1 X10E3/UL (ref 0–0.2)
BASOPHILS NFR BLD AUTO: 1 %
BILIRUB SERPL-MCNC: 0.5 MG/DL (ref 0–1.2)
BUN SERPL-MCNC: 16 MG/DL (ref 6–24)
BUN/CREAT SERPL: 19 (ref 9–20)
CALCIUM SERPL-MCNC: 9.1 MG/DL (ref 8.7–10.2)
CHLORIDE SERPL-SCNC: 104 MMOL/L (ref 96–106)
CHOLEST SERPL-MCNC: 224 MG/DL (ref 100–199)
CO2 SERPL-SCNC: 24 MMOL/L (ref 20–29)
CREAT SERPL-MCNC: 0.85 MG/DL (ref 0.76–1.27)
EGFRCR SERPLBLD CKD-EPI 2021: 101 ML/MIN/1.73
EOSINOPHIL # BLD AUTO: 0.2 X10E3/UL (ref 0–0.4)
EOSINOPHIL NFR BLD AUTO: 3 %
ERYTHROCYTE [DISTWIDTH] IN BLOOD BY AUTOMATED COUNT: 13 % (ref 11.6–15.4)
FERRITIN SERPL-MCNC: 151 NG/ML (ref 30–400)
GLOBULIN SER CALC-MCNC: 2.4 G/DL (ref 1.5–4.5)
GLUCOSE SERPL-MCNC: 109 MG/DL (ref 70–99)
HBA1C MFR BLD: 5.6 % (ref 4.8–5.6)
HCT VFR BLD AUTO: 41.4 % (ref 37.5–51)
HDLC SERPL-MCNC: 39 MG/DL
HGB BLD-MCNC: 14 G/DL (ref 13–17.7)
IMM GRANULOCYTES # BLD AUTO: 0 X10E3/UL (ref 0–0.1)
IMM GRANULOCYTES NFR BLD AUTO: 1 %
IRON SATN MFR SERPL: 27 % (ref 15–55)
IRON SERPL-MCNC: 92 UG/DL (ref 38–169)
LDLC SERPL CALC-MCNC: 159 MG/DL (ref 0–99)
LYMPHOCYTES # BLD AUTO: 2.3 X10E3/UL (ref 0.7–3.1)
LYMPHOCYTES NFR BLD AUTO: 38 %
MCH RBC QN AUTO: 30.4 PG (ref 26.6–33)
MCHC RBC AUTO-ENTMCNC: 33.8 G/DL (ref 31.5–35.7)
MCV RBC AUTO: 90 FL (ref 79–97)
MONOCYTES # BLD AUTO: 0.5 X10E3/UL (ref 0.1–0.9)
MONOCYTES NFR BLD AUTO: 8 %
NEUTROPHILS # BLD AUTO: 3 X10E3/UL (ref 1.4–7)
NEUTROPHILS NFR BLD AUTO: 49 %
PLATELET # BLD AUTO: 226 X10E3/UL (ref 150–450)
POTASSIUM SERPL-SCNC: 4.2 MMOL/L (ref 3.5–5.2)
PROT SERPL-MCNC: 6.6 G/DL (ref 6–8.5)
PSA SERPL-MCNC: 1.4 NG/ML (ref 0–4)
RBC # BLD AUTO: 4.61 X10E6/UL (ref 4.14–5.8)
SODIUM SERPL-SCNC: 143 MMOL/L (ref 134–144)
TIBC SERPL-MCNC: 335 UG/DL (ref 250–450)
TRIGL SERPL-MCNC: 144 MG/DL (ref 0–149)
TSH SERPL DL<=0.005 MIU/L-ACNC: 2.27 UIU/ML (ref 0.45–4.5)
UIBC SERPL-MCNC: 243 UG/DL (ref 111–343)
VIT B12 SERPL-MCNC: >2000 PG/ML (ref 232–1245)
VLDLC SERPL CALC-MCNC: 26 MG/DL (ref 5–40)
WBC # BLD AUTO: 6.1 X10E3/UL (ref 3.4–10.8)

## 2024-01-11 ENCOUNTER — TELEPHONE (OUTPATIENT)
Dept: INTERNAL MEDICINE | Facility: CLINIC | Age: 59
End: 2024-01-11

## 2024-01-11 NOTE — TELEPHONE ENCOUNTER
"  Caller: Wei Salgado \"Gilberto\"    Relationship: Self    Best call back number: [phone]    What is the best time to reach you: ANYTIME     Who are you requesting to speak with (clinical staff, provider,  specific staff member): CLINICAL     What was the call regarding: PATIENT WOULD LIKE TO KNOW IF HE SHOULD BE CONCERNED ABOUT HIS GLUCOSE LEVELS.     PLEASE ADVISE     "

## 2024-02-05 RX ORDER — FLUTICASONE PROPIONATE 50 MCG
2 SPRAY, SUSPENSION (ML) NASAL DAILY
Qty: 16 G | Refills: 5 | Status: SHIPPED | OUTPATIENT
Start: 2024-02-05

## 2024-03-15 RX ORDER — FLUTICASONE PROPIONATE 50 MCG
2 SPRAY, SUSPENSION (ML) NASAL DAILY
Qty: 16 G | Refills: 5 | Status: SHIPPED | OUTPATIENT
Start: 2024-03-15

## 2024-03-21 DIAGNOSIS — I10 ESSENTIAL HYPERTENSION: ICD-10-CM

## 2024-03-21 RX ORDER — LOSARTAN POTASSIUM AND HYDROCHLOROTHIAZIDE 25; 100 MG/1; MG/1
1 TABLET ORAL DAILY
Qty: 90 TABLET | Refills: 0 | Status: SHIPPED | OUTPATIENT
Start: 2024-03-21

## 2024-04-22 RX ORDER — FLUTICASONE PROPIONATE 50 MCG
2 SPRAY, SUSPENSION (ML) NASAL DAILY
Qty: 16 G | Refills: 5 | Status: SHIPPED | OUTPATIENT
Start: 2024-04-22

## 2024-05-07 ENCOUNTER — TELEPHONE (OUTPATIENT)
Dept: INTERNAL MEDICINE | Facility: CLINIC | Age: 59
End: 2024-05-07
Payer: COMMERCIAL

## 2024-05-07 NOTE — TELEPHONE ENCOUNTER
"Okay for hub to read*  Freda with [facility] (previously [facility]) called to request the patients prescription for cpap/supplies with last office visit notes. I did see Dr. Riggins's message  \"would be nice to know what he was using before in terms of settings\", I did see his previous order from [employer] in media from 02/27/2024. Would that be sufficient to use?   "

## 2024-05-08 NOTE — TELEPHONE ENCOUNTER
You can fax the [facility] order and the last office note, please.    Thank you

## 2024-05-13 NOTE — TELEPHONE ENCOUNTER
Freda has faxed us a new prescription form and requests that we make an addendum for his most recent visit notes mentioning his SIVAN as well.

## 2024-05-15 ENCOUNTER — OFFICE VISIT (OUTPATIENT)
Dept: INTERNAL MEDICINE | Facility: CLINIC | Age: 59
End: 2024-05-15
Payer: COMMERCIAL

## 2024-05-15 VITALS
DIASTOLIC BLOOD PRESSURE: 74 MMHG | HEART RATE: 68 BPM | OXYGEN SATURATION: 98 % | SYSTOLIC BLOOD PRESSURE: 124 MMHG | RESPIRATION RATE: 16 BRPM | BODY MASS INDEX: 35.68 KG/M2 | HEIGHT: 73 IN | WEIGHT: 269.2 LBS

## 2024-05-15 DIAGNOSIS — I10 PRIMARY HYPERTENSION: ICD-10-CM

## 2024-05-15 DIAGNOSIS — L82.1 SEBORRHEIC KERATOSIS: Primary | ICD-10-CM

## 2024-05-15 DIAGNOSIS — E78.5 HYPERLIPIDEMIA, UNSPECIFIED HYPERLIPIDEMIA TYPE: ICD-10-CM

## 2024-05-15 PROBLEM — G61.81 CIDP (CHRONIC INFLAMMATORY DEMYELINATING POLYNEUROPATHY): Status: ACTIVE | Noted: 2024-05-15

## 2024-05-15 PROCEDURE — 1159F MED LIST DOCD IN RCRD: CPT | Performed by: INTERNAL MEDICINE

## 2024-05-15 PROCEDURE — 1125F AMNT PAIN NOTED PAIN PRSNT: CPT | Performed by: INTERNAL MEDICINE

## 2024-05-15 PROCEDURE — 1160F RVW MEDS BY RX/DR IN RCRD: CPT | Performed by: INTERNAL MEDICINE

## 2024-05-15 PROCEDURE — 99214 OFFICE O/P EST MOD 30 MIN: CPT | Performed by: INTERNAL MEDICINE

## 2024-05-15 NOTE — PROGRESS NOTES
"Chief Complaint  Mass (Growth behind LT knee/Pain in RT leg/Lower back pain/Discuss weight loss plateau)    Subjective        Wei Salgado presents to [facility]  History of Present Illness  Here for follow up  Started doing keto and down 30 lbs or so, BMI down to 35  He is not exercising, has plateau  Has growth behind left knee c/w seborrheic keratosis       Objective   Vital Signs:  /74   Pulse 68   Resp 16   Ht 185.4 cm (73\")   Wt 122 kg (269 lb 3.2 oz)   SpO2 98%   BMI 35.52 kg/m²   Estimated body mass index is 35.52 kg/m² as calculated from the following:    Height as of this encounter: 185.4 cm (73\").    Weight as of this encounter: 122 kg (269 lb 3.2 oz).       Class 2 Severe Obesity (BMI >=35 and <=39.9). Obesity-related health conditions include the following: hypertension. Obesity is improving with treatment. BMI is is above average; BMI management plan is completed. We discussed low calorie, low carb based diet program, portion control, increasing exercise, joining a fitness center or start home based exercise program, Weight Watchers or other Commercial based weight reduction program, and pharmacologic options including phentermine, glp1 .      Physical Exam  Vitals and nursing note reviewed.   Constitutional:       General: He is not in acute distress.     Appearance: Normal appearance. He is not toxic-appearing.   HENT:      Head: Normocephalic and atraumatic.      Right Ear: External ear normal.      Left Ear: External ear normal.      Nose: Nose normal.   Eyes:      General: No scleral icterus.        Right eye: No discharge.         Left eye: No discharge.      Extraocular Movements: Extraocular movements intact.      Conjunctiva/sclera: Conjunctivae normal.      Pupils: Pupils are equal, round, and reactive to light.   Cardiovascular:      Rate and Rhythm: Normal rate and regular rhythm.      Pulses: Normal pulses.      Heart sounds: " Normal heart sounds. No murmur heard.  Pulmonary:      Effort: Pulmonary effort is normal.   Musculoskeletal:         General: Normal range of motion.   Skin:     General: Skin is warm.      Capillary Refill: Capillary refill takes less than 2 seconds.      Comments: Seborrheic keratosis posterior left knee   Neurological:      General: No focal deficit present.      Mental Status: He is alert and oriented to person, place, and time. Mental status is at baseline.      Cranial Nerves: No cranial nerve deficit.      Gait: Gait normal.   Psychiatric:         Mood and Affect: Mood normal.         Behavior: Behavior normal.         Thought Content: Thought content normal.         Judgment: Judgment normal.        Result Review :    The following data was reviewed by: Johnathan Riggins MD on 05/15/2024:  Common labs          1/8/2024    08:13   Common Labs   Glucose 109    BUN 16    Creatinine 0.85    Sodium 143    Potassium 4.2    Chloride 104    Calcium 9.1    Total Protein 6.6    Albumin 4.2    Total Bilirubin 0.5    Alkaline Phosphatase 80    AST (SGOT) 19    ALT (SGPT) 22    WBC 6.1    Hemoglobin 14.0    Hematocrit 41.4    Platelets 226    Total Cholesterol 224    Triglycerides 144    HDL Cholesterol 39    LDL Cholesterol  159    Hemoglobin A1C 5.6    PSA 1.4      Data reviewed : prior office notes reviewed             Assessment and Plan     Diagnoses and all orders for this visit:    1. Seborrheic keratosis (Primary)  Not bothersome, no treatment required  Can see derm for cryotherapy if becomes more irritating/itchy    2. BMI 35.0-35.9,adult  -     Hemoglobin A1c  -     Lipid panel  -     Comprehensive metabolic panel  -     Testosterone, Free, Total  -     FSH & LH  Has done quite well with keto down 30 lbs since April 1   We discussed other treatment options as well  Increase physical activity  Log/track food intake    3. Hyperlipidemia, unspecified hyperlipidemia type  -     Lipid panel    4. Primary hypertension  -      Comprehensive metabolic panel  Controlled continue losartan hctz 100-25mg daily    5.SIVAN on CPAP  Reports good compliance, symptoms have significantly improved on CPAP therapy     I spent 20 minutes caring for Wei on this date of service. This time includes time spent by me in the following activities:preparing for the visit, reviewing tests, obtaining and/or reviewing a separately obtained history, performing a medically appropriate examination and/or evaluation , counseling and educating the patient/family/caregiver, ordering medications, tests, or procedures, and documenting information in the medical record  Follow Up     No follow-ups on file.  Patient was given instructions and counseling regarding his condition or for health maintenance advice. Please see specific information pulled into the AVS if appropriate.     Johnathan Riggins MD  [facility]  [address]  Phone: [phone]

## 2024-05-17 LAB
ALBUMIN SERPL-MCNC: 4.6 G/DL (ref 3.5–5.2)
ALBUMIN/GLOB SERPL: 1.8 G/DL
ALP SERPL-CCNC: 78 U/L (ref 39–117)
ALT SERPL-CCNC: 31 U/L (ref 1–41)
AST SERPL-CCNC: 33 U/L (ref 1–40)
BILIRUB SERPL-MCNC: 0.5 MG/DL (ref 0–1.2)
BUN SERPL-MCNC: 24 MG/DL (ref 6–20)
BUN/CREAT SERPL: 27.3 (ref 7–25)
CALCIUM SERPL-MCNC: 9.6 MG/DL (ref 8.6–10.5)
CHLORIDE SERPL-SCNC: 103 MMOL/L (ref 98–107)
CHOLEST SERPL-MCNC: 204 MG/DL (ref 0–200)
CO2 SERPL-SCNC: 26.3 MMOL/L (ref 22–29)
CREAT SERPL-MCNC: 0.88 MG/DL (ref 0.76–1.27)
EGFRCR SERPLBLD CKD-EPI 2021: 99.7 ML/MIN/1.73
FSH SERPL-ACNC: 6.3 MIU/ML (ref 1.5–12.4)
GLOBULIN SER CALC-MCNC: 2.5 GM/DL
GLUCOSE SERPL-MCNC: 102 MG/DL (ref 65–99)
HBA1C MFR BLD: 5.3 % (ref 4.8–5.6)
HDLC SERPL-MCNC: 38 MG/DL (ref 40–60)
LDLC SERPL CALC-MCNC: 148 MG/DL (ref 0–100)
LH SERPL-ACNC: 4.4 MIU/ML (ref 1.7–8.6)
POTASSIUM SERPL-SCNC: 4.3 MMOL/L (ref 3.5–5.2)
PROT SERPL-MCNC: 7.1 G/DL (ref 6–8.5)
SODIUM SERPL-SCNC: 142 MMOL/L (ref 136–145)
TESTOST FREE SERPL-MCNC: 5 PG/ML (ref 7.2–24)
TESTOST SERPL-MCNC: 416 NG/DL (ref 264–916)
TRIGL SERPL-MCNC: 101 MG/DL (ref 0–150)
VLDLC SERPL CALC-MCNC: 18 MG/DL (ref 5–40)

## 2024-05-20 ENCOUNTER — TELEPHONE (OUTPATIENT)
Dept: INTERNAL MEDICINE | Facility: CLINIC | Age: 59
End: 2024-05-20
Payer: COMMERCIAL

## 2024-05-20 NOTE — TELEPHONE ENCOUNTER
Could you please put an addendum to one of Gilberto's recent notes stating that his SIVAN is doing well due to using his C-Pap?  I need to fax it over to [employer] so he can get his C-Pap supplies.    Thank you

## 2024-06-12 RX ORDER — FLUTICASONE PROPIONATE 50 MCG
2 SPRAY, SUSPENSION (ML) NASAL DAILY
Qty: 16 G | Refills: 5 | Status: SHIPPED | OUTPATIENT
Start: 2024-06-12

## 2024-06-17 ENCOUNTER — PATIENT MESSAGE (OUTPATIENT)
Dept: INTERNAL MEDICINE | Facility: CLINIC | Age: 59
End: 2024-06-17
Payer: COMMERCIAL

## 2024-06-17 DIAGNOSIS — I10 ESSENTIAL HYPERTENSION: ICD-10-CM

## 2024-06-17 RX ORDER — LOSARTAN POTASSIUM AND HYDROCHLOROTHIAZIDE 25; 100 MG/1; MG/1
1 TABLET ORAL DAILY
Qty: 90 TABLET | Refills: 0 | Status: SHIPPED | OUTPATIENT
Start: 2024-06-17

## 2024-06-20 NOTE — TELEPHONE ENCOUNTER
I faxed the order and the office note with addendum again on 6/20/24 to Freda Hartman ([phone])

## 2024-07-15 DIAGNOSIS — H91.90 HEARING LOSS, UNSPECIFIED HEARING LOSS TYPE, UNSPECIFIED LATERALITY: Primary | ICD-10-CM

## 2024-08-19 RX ORDER — FLUTICASONE PROPIONATE 50 MCG
2 SPRAY, SUSPENSION (ML) NASAL DAILY
Qty: 16 G | Refills: 5 | Status: SHIPPED | OUTPATIENT
Start: 2024-08-19

## 2024-09-21 DIAGNOSIS — I10 ESSENTIAL HYPERTENSION: ICD-10-CM

## 2024-09-23 RX ORDER — LOSARTAN POTASSIUM AND HYDROCHLOROTHIAZIDE 25; 100 MG/1; MG/1
1 TABLET ORAL DAILY
Qty: 90 TABLET | Refills: 0 | Status: SHIPPED | OUTPATIENT
Start: 2024-09-23

## 2024-09-23 RX ORDER — FLUTICASONE PROPIONATE 50 UG/1
2 SPRAY, METERED NASAL DAILY
Qty: 16 G | Refills: 5 | Status: SHIPPED | OUTPATIENT
Start: 2024-09-23

## 2024-12-20 DIAGNOSIS — I10 ESSENTIAL HYPERTENSION: ICD-10-CM

## 2024-12-20 RX ORDER — LOSARTAN POTASSIUM AND HYDROCHLOROTHIAZIDE 25; 100 MG/1; MG/1
1 TABLET ORAL DAILY
Qty: 90 TABLET | Refills: 0 | Status: SHIPPED | OUTPATIENT
Start: 2024-12-20

## 2025-01-03 ENCOUNTER — OFFICE VISIT (OUTPATIENT)
Dept: INTERNAL MEDICINE | Facility: CLINIC | Age: 60
End: 2025-01-03
Payer: COMMERCIAL

## 2025-01-03 VITALS
RESPIRATION RATE: 16 BRPM | OXYGEN SATURATION: 95 % | HEART RATE: 80 BPM | WEIGHT: 269 LBS | BODY MASS INDEX: 35.65 KG/M2 | TEMPERATURE: 99.1 F | HEIGHT: 73 IN

## 2025-01-03 DIAGNOSIS — Z20.828 RSV EXPOSURE: Primary | ICD-10-CM

## 2025-01-03 LAB
EXPIRATION DATE: ABNORMAL
EXPIRATION DATE: NORMAL
FLUAV AG UPPER RESP QL IA.RAPID: NOT DETECTED
FLUBV AG UPPER RESP QL IA.RAPID: NOT DETECTED
INTERNAL CONTROL: ABNORMAL
INTERNAL CONTROL: NORMAL
Lab: ABNORMAL
Lab: NORMAL
RSV AG SPEC QL: DETECTED
SARS-COV-2 AG UPPER RESP QL IA.RAPID: NOT DETECTED

## 2025-01-03 PROCEDURE — 99213 OFFICE O/P EST LOW 20 MIN: CPT | Performed by: INTERNAL MEDICINE

## 2025-01-03 PROCEDURE — 87807 RSV ASSAY W/OPTIC: CPT | Performed by: INTERNAL MEDICINE

## 2025-01-03 PROCEDURE — 1125F AMNT PAIN NOTED PAIN PRSNT: CPT | Performed by: INTERNAL MEDICINE

## 2025-01-03 PROCEDURE — 87428 SARSCOV & INF VIR A&B AG IA: CPT | Performed by: INTERNAL MEDICINE

## 2025-01-03 RX ORDER — DEXTROMETHORPHAN HYDROBROMIDE AND PROMETHAZINE HYDROCHLORIDE 15; 6.25 MG/5ML; MG/5ML
5 SYRUP ORAL 4 TIMES DAILY PRN
Qty: 120 ML | Refills: 1 | Status: SHIPPED | OUTPATIENT
Start: 2025-01-03

## 2025-01-03 NOTE — PROGRESS NOTES
"Chief Complaint  Nasal Congestion and Cough    Subjective        Wei Salgado presents to [facility]  Cough      3 to 4 days of nasal congestion and cough.  He is not really taking any medication for it other than over-the-counter.  No significant fever chills or shortness of breath.  A lot of his family had upper respiratory illnesses around the holidays.  Had a lot of grandchildren around him as well that were sick  Objective   Vital Signs:  Pulse 80   Temp 99.1 °F (37.3 °C) (Temporal)   Resp 16   Ht 185.4 cm (73\")   Wt 122 kg (269 lb)   SpO2 95%   BMI 35.49 kg/m²   Estimated body mass index is 35.49 kg/m² as calculated from the following:    Height as of this encounter: 185.4 cm (73\").    Weight as of this encounter: 122 kg (269 lb).            Physical Exam  Constitutional:       Appearance: Normal appearance.   HENT:      Head: Normocephalic and atraumatic.      Right Ear: Tympanic membrane and ear canal normal.      Left Ear: Tympanic membrane and ear canal normal.      Nose: Congestion present.      Mouth/Throat:      Pharynx: Oropharynx is clear. No oropharyngeal exudate or posterior oropharyngeal erythema.   Eyes:      Pupils: Pupils are equal, round, and reactive to light.   Cardiovascular:      Rate and Rhythm: Normal rate and regular rhythm.      Pulses: Normal pulses.      Heart sounds: Normal heart sounds. No murmur heard.  Pulmonary:      Effort: Pulmonary effort is normal. No respiratory distress.      Breath sounds: Normal breath sounds. No stridor. No wheezing.   Abdominal:      General: Abdomen is flat.      Palpations: Abdomen is soft.   Musculoskeletal:      Cervical back: Normal range of motion and neck supple.      Right lower leg: No edema.      Left lower leg: No edema.   Skin:     General: Skin is warm and dry.      Capillary Refill: Capillary refill takes less than 2 seconds.   Neurological:      General: No focal deficit present. "      Mental Status: He is alert and oriented to person, place, and time.        Result Review :                Assessment and Plan   Diagnoses and all orders for this visit:    1. RSV exposure (Primary)  -     Covid-19 + Flu A&B AG, Veritor  -     POCT RSV    Other orders  -     promethazine-dextromethorphan (PROMETHAZINE-DM) 6.25-15 MG/5ML syrup; Take 5 mL by mouth 4 (Four) Times a Day As Needed for Cough.  Dispense: 120 mL; Refill: 1    RSV URI.  No lower respiratory tract involvement clinically.  No fever or chills or secondary bacterial infection evident.  Cautioned him on any recurrent fever or persistent symptoms we can reevaluate.  Promethazine DM as needed.  Tylenol or ibuprofen as needed and recheck if no improvement or problems         Follow Up   No follow-ups on file.  Patient was given instructions and counseling regarding his condition or for health maintenance advice. Please see specific information pulled into the AVS if appropriate.

## 2025-01-30 RX ORDER — FLUTICASONE PROPIONATE 50 MCG
2 SPRAY, SUSPENSION (ML) NASAL DAILY
Qty: 16 G | Refills: 5 | Status: SHIPPED | OUTPATIENT
Start: 2025-01-30

## 2025-02-07 ENCOUNTER — PATIENT MESSAGE (OUTPATIENT)
Dept: INTERNAL MEDICINE | Facility: CLINIC | Age: 60
End: 2025-02-07
Payer: COMMERCIAL

## 2025-02-13 ENCOUNTER — OFFICE VISIT (OUTPATIENT)
Dept: INTERNAL MEDICINE | Facility: CLINIC | Age: 60
End: 2025-02-13
Payer: COMMERCIAL

## 2025-02-13 VITALS
SYSTOLIC BLOOD PRESSURE: 142 MMHG | HEIGHT: 73 IN | HEART RATE: 88 BPM | OXYGEN SATURATION: 95 % | BODY MASS INDEX: 36.18 KG/M2 | WEIGHT: 273 LBS | DIASTOLIC BLOOD PRESSURE: 80 MMHG | RESPIRATION RATE: 20 BRPM

## 2025-02-13 DIAGNOSIS — J30.1 SEASONAL ALLERGIC RHINITIS DUE TO POLLEN: ICD-10-CM

## 2025-02-13 DIAGNOSIS — J32.9 SINUSITIS, UNSPECIFIED CHRONICITY, UNSPECIFIED LOCATION: Primary | ICD-10-CM

## 2025-02-13 PROCEDURE — 99214 OFFICE O/P EST MOD 30 MIN: CPT | Performed by: INTERNAL MEDICINE

## 2025-02-13 PROCEDURE — 1160F RVW MEDS BY RX/DR IN RCRD: CPT | Performed by: INTERNAL MEDICINE

## 2025-02-13 PROCEDURE — 1125F AMNT PAIN NOTED PAIN PRSNT: CPT | Performed by: INTERNAL MEDICINE

## 2025-02-13 PROCEDURE — 1159F MED LIST DOCD IN RCRD: CPT | Performed by: INTERNAL MEDICINE

## 2025-02-13 RX ORDER — AZELASTINE 1 MG/ML
2 SPRAY, METERED NASAL 2 TIMES DAILY
Qty: 30 ML | Refills: 12 | Status: SHIPPED | OUTPATIENT
Start: 2025-02-13

## 2025-02-13 RX ORDER — LEVOCETIRIZINE DIHYDROCHLORIDE 5 MG/1
5 TABLET, FILM COATED ORAL EVERY EVENING
Qty: 30 TABLET | Refills: 2 | Status: SHIPPED | OUTPATIENT
Start: 2025-02-13

## 2025-02-13 NOTE — PROGRESS NOTES
Wei Salgado is a 59 y.o. male, who presents with a chief complaint of   Chief Complaint   Patient presents with    URI     Had RSV 1 month ago  Still has the cough           Wei Salgado is a 58 y/o M with PMH of HTN, HLD, SIVAN on CPAP, LBP, and neuropathy who presents with ~1 month of cough after being diagnosed with RSV. Patient was last seen by Dr. Langford on 1/3/25 for cough and was diagnosed with RSV. Patient reports that after 1 week of diagnosis he had an episode of nausea that improved with rest and diarrhea. He reports no further GI symptoms since that time. He does note that he has had had persistent cough during this entire time. He reports that he has been taking over the counter medications such as guaifenesin and Claratin D to help with his cough but has not noticed any improvement. He notes that he has also been having nasal congestion and sinus pressure and feels that after taking the OTC medications he has been having more rhinorrhea and has been having occasional production of green/clear sputum for the past 1 week. He reports that his cough is worse when he lies down/reclines and attributes that to the rhinorrhea. Patient does report associated symptoms of sore throat which he attributes to coughing. Patient denies any fevers/chills, night sweats, ear pain/congestion, or any association of cough with eating. Patient does report having a history of seasonal allergies and taking Claratin to help with his symptoms but reports that taking Claratin recently has not helped with his symptoms. Patient does note that during [place] his family did have multiple viral symptoms and now reports that other than him and his son (who also has persistent cough), every one else has had their symptoms resolve.         The following portions of the patient's history were reviewed and updated as appropriate: allergies, current medications, past family history, past medical history, past social history, past  surgical history and problem list.    Allergies: Patient has no known allergies.    Review of Systems   Constitutional:  Negative for chills and fever.   HENT:  Positive for postnasal drip, rhinorrhea, sinus pressure and sore throat. Negative for ear pain and hearing loss.    Eyes:  Negative for pain, discharge and redness.   Respiratory:  Positive for cough. Negative for shortness of breath and wheezing.    Cardiovascular:  Negative for chest pain and palpitations.   Gastrointestinal:  Negative for abdominal pain, constipation, diarrhea, nausea and vomiting.   Genitourinary:  Negative for dysuria and hematuria.   Musculoskeletal:  Negative for arthralgias and myalgias.   Skin:  Negative for rash.   Neurological:  Positive for headaches. Negative for dizziness.             Wt Readings from Last 3 Encounters:   02/13/25 124 kg (273 lb)   01/03/25 122 kg (269 lb)   05/15/24 122 kg (269 lb 3.2 oz)     Temp Readings from Last 3 Encounters:   01/03/25 99.1 °F (37.3 °C) (Temporal)   01/08/24 98.3 °F (36.8 °C)   09/11/23 97.7 °F (36.5 °C)     BP Readings from Last 3 Encounters:   02/13/25 142/80   05/15/24 124/74   01/08/24 136/70     Pulse Readings from Last 3 Encounters:   02/13/25 88   01/03/25 80   05/15/24 68     Body mass index is 36.02 kg/m².  SpO2 Readings from Last 3 Encounters:   02/13/25 95%   01/03/25 95%   05/15/24 98%          Physical Exam  Constitutional:       General: He is not in acute distress.     Appearance: Normal appearance. He is not ill-appearing.   HENT:      Head: Normocephalic and atraumatic.      Nose: Nose normal.      Mouth/Throat:      Mouth: Mucous membranes are moist.      Pharynx: Oropharynx is clear. No posterior oropharyngeal erythema.   Eyes:      General: No scleral icterus.     Extraocular Movements: Extraocular movements intact.      Conjunctiva/sclera: Conjunctivae normal.   Cardiovascular:      Rate and Rhythm: Normal rate and regular rhythm.      Pulses: Normal pulses.       Heart sounds: Normal heart sounds.   Pulmonary:      Effort: Pulmonary effort is normal. No respiratory distress.      Breath sounds: Normal breath sounds. No stridor. No wheezing, rhonchi or rales.   Abdominal:      General: Bowel sounds are normal. There is no distension.      Palpations: Abdomen is soft.      Tenderness: There is no abdominal tenderness. There is no guarding.   Musculoskeletal:         General: Normal range of motion.      Cervical back: Normal range of motion.      Right lower leg: No edema.      Left lower leg: No edema.   Skin:     General: Skin is warm and dry.   Neurological:      General: No focal deficit present.      Mental Status: He is alert and oriented to person, place, and time. Mental status is at baseline.   Psychiatric:         Mood and Affect: Mood normal.         Behavior: Behavior normal.         Thought Content: Thought content normal.         Judgment: Judgment normal.         Results for orders placed or performed in visit on 01/03/25   POCT RSV    Collection Time: 01/03/25  2:54 PM    Specimen: Swab   Result Value Ref Range    Respiratory Syncytial Virus detected     Internal Control Passed Passed    Lot Number [id number]     Expiration Date 10/23/2025    Covid-19 + Flu A&B AG, Veritor    Collection Time: 01/03/25  2:55 PM    Specimen: Swab   Result Value Ref Range    SARS Antigen Not Detected Not Detected, Presumptive Negative    Influenza A Antigen JODIE Not Detected Not Detected    Influenza B Antigen JODIE Not Detected Not Detected    Internal Control Passed Passed    Lot Number [id number]     Expiration Date 10/23/2025      Result Review :   The following data was reviewed by: Johnathan Riggins MD on 02/13/2025:  Common labs          5/15/2024    08:26   Common Labs   Glucose 102    BUN 24    Creatinine 0.88    Sodium 142    Potassium 4.3    Chloride 103    Calcium 9.6    Albumin 4.6    Total Bilirubin 0.5    Alkaline Phosphatase 78    AST (SGOT) 33    ALT (SGPT) 31    Total  Cholesterol 204    Triglycerides 101    HDL Cholesterol 38    LDL Cholesterol  148    Hemoglobin A1C 5.30      Data reviewed : prior office notes reviewed            Assessment and Plan    Diagnoses and all orders for this visit:    1. Sinusitis, unspecified chronicity, unspecified location (Primary)  -     amoxicillin-clavulanate (AUGMENTIN) 875-125 MG per tablet; Take 1 tablet by mouth 2 (Two) Times a Day.  Dispense: 20 tablet; Refill: 0    2. Seasonal allergic rhinitis due to pollen  -     azelastine (ASTELIN) 0.1 % nasal spray; Administer 2 sprays into the nostril(s) as directed by provider 2 (Two) Times a Day. Use in each nostril as directed  Dispense: 30 mL; Refill: 12  -     levocetirizine (XYZAL) 5 MG tablet; Take 1 tablet by mouth Every Evening.  Dispense: 30 tablet; Refill: 2       Wei Salgado is a 58 y/o M with PMH of HTN, HLD, SIVAN on CPAP, LBP, and neuropathy who presents with ~1 month of cough after being diagnosed with RSV.    #Subacute cough  #Sinus pressure  - Patient diagnosed with RSV on 1/3/25 with persistent cough (6 weeks)  - No improvement in cough with OTC medications (guaifenesin or claratin), but does report rhinorrhea  - Reports daily use of Flonase with CPAP, but no improvement in symptoms  - Patient denies flu/covid/pneumonia vaccinations  - DDX: postinfectious cough, upper airway cough syndrome, rhinosinusitis, vs additional viral infection  Plan:  - Will add on azelastine nasal spray for possible UACS  - Will start levocetirizine for possible allergic symptoms  - Start emperic antibiotics with Augmentin for possible rhinosinusitis          I spent 15 minutes caring for Wei on this date of service. This time includes time spent by me in the following activities:preparing for the visit, reviewing tests, obtaining and/or reviewing a separately obtained history, performing a medically appropriate examination and/or evaluation , counseling and educating the patient/family/caregiver,  ordering medications, tests, or procedures, and documenting information in the medical record      Outpatient Medications Prior to Visit   Medication Sig Dispense Refill    Aspirin-Caffeine ([employer]) 500-32.5 MG tablet Take  by mouth.      fluticasone (FLONASE) 50 MCG/ACT nasal spray Administer 2 sprays into the nostril(s) as directed by provider Daily. 16 g 5    losartan-hydrochlorothiazide (HYZAAR) 100-25 MG per tablet Take 1 tablet by mouth once daily 90 tablet 0    MAGNESIUM GLYCINATE PO Take  by mouth.      vitamin B-12 (CYANOCOBALAMIN) 1000 MCG tablet Take 1 tablet by mouth Daily. 30 tablet 5    promethazine-dextromethorphan (PROMETHAZINE-DM) 6.25-15 MG/5ML syrup Take 5 mL by mouth 4 (Four) Times a Day As Needed for Cough. 120 mL 1     No facility-administered medications prior to visit.     New Medications Ordered This Visit   Medications    azelastine (ASTELIN) 0.1 % nasal spray     Sig: Administer 2 sprays into the nostril(s) as directed by provider 2 (Two) Times a Day. Use in each nostril as directed     Dispense:  30 mL     Refill:  12    amoxicillin-clavulanate (AUGMENTIN) 875-125 MG per tablet     Sig: Take 1 tablet by mouth 2 (Two) Times a Day.     Dispense:  20 tablet     Refill:  0    levocetirizine (XYZAL) 5 MG tablet     Sig: Take 1 tablet by mouth Every Evening.     Dispense:  30 tablet     Refill:  2     [unfilled]  Medications Discontinued During This Encounter   Medication Reason    promethazine-dextromethorphan (PROMETHAZINE-DM) 6.25-15 MG/5ML syrup *Therapy completed           Patient was given instructions and counseling regarding her condition or for health maintenance advice. Please see specific information pulled into the AVS if appropriate.    Patient seen and examined personally by me following resident evaluation. Agree with assessment and plan as above unless documented below.    Johnathan Riggins MD   [facility]

## 2025-03-10 RX ORDER — FLUTICASONE PROPIONATE 50 MCG
2 SPRAY, SUSPENSION (ML) NASAL DAILY
Qty: 16 G | Refills: 5 | Status: SHIPPED | OUTPATIENT
Start: 2025-03-10

## 2025-03-15 ENCOUNTER — HOSPITAL ENCOUNTER (EMERGENCY)
Facility: HOSPITAL | Age: 60
Discharge: HOME | End: 2025-03-15
Attending: STUDENT IN AN ORGANIZED HEALTH CARE EDUCATION/TRAINING PROGRAM
Payer: COMMERCIAL

## 2025-03-15 VITALS
BODY MASS INDEX: 36.23 KG/M2 | WEIGHT: 273.37 LBS | SYSTOLIC BLOOD PRESSURE: 128 MMHG | TEMPERATURE: 98.6 F | HEIGHT: 73 IN | DIASTOLIC BLOOD PRESSURE: 78 MMHG | OXYGEN SATURATION: 97 % | HEART RATE: 111 BPM | RESPIRATION RATE: 17 BRPM

## 2025-03-15 DIAGNOSIS — U07.1 COVID-19: Primary | ICD-10-CM

## 2025-03-15 LAB
FLUAV RNA RESP QL NAA+PROBE: NOT DETECTED
FLUBV RNA RESP QL NAA+PROBE: NOT DETECTED
RSV RNA RESP QL NAA+PROBE: NOT DETECTED
SARS-COV-2 RNA RESP QL NAA+PROBE: DETECTED

## 2025-03-15 PROCEDURE — 99283 EMERGENCY DEPT VISIT LOW MDM: CPT | Performed by: STUDENT IN AN ORGANIZED HEALTH CARE EDUCATION/TRAINING PROGRAM

## 2025-03-15 PROCEDURE — 87637 SARSCOV2&INF A&B&RSV AMP PRB: CPT | Performed by: STUDENT IN AN ORGANIZED HEALTH CARE EDUCATION/TRAINING PROGRAM

## 2025-03-15 PROCEDURE — 63710000001 ONDANSETRON ODT 4 MG TABLET DISPERSIBLE: Performed by: STUDENT IN AN ORGANIZED HEALTH CARE EDUCATION/TRAINING PROGRAM

## 2025-03-15 RX ORDER — ONDANSETRON 4 MG/1
4 TABLET, ORALLY DISINTEGRATING ORAL ONCE
Status: COMPLETED | OUTPATIENT
Start: 2025-03-15 | End: 2025-03-15

## 2025-03-15 RX ORDER — ONDANSETRON 4 MG/1
4 TABLET, ORALLY DISINTEGRATING ORAL EVERY 8 HOURS PRN
Qty: 15 TABLET | Refills: 0 | Status: SHIPPED | OUTPATIENT
Start: 2025-03-15 | End: 2025-03-20

## 2025-03-15 RX ORDER — BROMPHENIRAMINE MALEATE, PSEUDOEPHEDRINE HYDROCHLORIDE, AND DEXTROMETHORPHAN HYDROBROMIDE 2; 30; 10 MG/5ML; MG/5ML; MG/5ML
5 SYRUP ORAL 4 TIMES DAILY PRN
Qty: 118 ML | Refills: 0 | Status: SHIPPED | OUTPATIENT
Start: 2025-03-15 | End: 2025-03-22

## 2025-03-15 RX ADMIN — ONDANSETRON 4 MG: 4 TABLET, ORALLY DISINTEGRATING ORAL at 05:38

## 2025-03-15 NOTE — ED PROVIDER NOTES
Subjective   History of Present Illness  Pt is a 59 y.o. male with PMH as listed who presents for   Chief Complaint   Patient presents with    Fever       Patient is a 59-year-old male presents with rhinorrhea congestion body aches for the past 1 to 2 days.  His daughter and son all have the same symptoms, had a fever at home with Tmax of one 1.6.  He took ibuprofen, resolution of fever currently afebrile at 98.6 Fahrenheit here.  No chest pain shortness of breath nausea vomiting currently has no other new complaints currently.  States that the bodyaches improved with ibuprofen.      Review of Systems    Past Medical History:   Diagnosis Date    Allergic     Arthritis     Headache     HL (hearing loss) 3 years ago maybe, not for sure.    Hyperlipidemia     Hypertension     Kidney stone 2021    Low back pain 2020    Neuromuscular disorder 08/2020    Neuropathy    Sleep apnea     USES CPAP        No Known Allergies    Past Surgical History:   Procedure Laterality Date    BACK SURGERY      COLONOSCOPY N/A 11/20/2020    Procedure: COLONOSCOPY INTO CECUM;  Surgeon: Jesse Frey MD;  Location: [facility] ENDOSCOPY;  Service: Gastroenterology;  Laterality: N/A;  PRE: SCREENING  POST: HEMORRHOIDS    CYSTOSCOPY URETEROSCOPY LASER LITHOTRIPSY Left 09/27/2021    Procedure: LEFT URETEROSCOPY LASER LITHOTRIPSY, STONE BASKET, POSSIBLE STENT;  Surgeon: Jethro Valentin MD;  Location: [facility] OR;  Service: Urology;  Laterality: Left;    HAND SURGERY  1977    LUMBAR EPIDURAL INJECTION N/A 02/17/2022    Procedure: lumbar epidural steroid injection lumbar4-5 or lumbar5-sacral1;  Surgeon: Mitch Acosta MD;  Location: [facility] MAIN OR;  Service: Pain Management;  Laterality: N/A;    LUMBAR EPIDURAL INJECTION N/A 03/29/2022    Procedure: lumbar epidural steroid injection lumbar5-sacral1;  Surgeon: Mitch Acosta MD;  Location: [facility] MAIN OR;  Service: Pain Management;  Laterality: N/A;    LUMBAR LAMINECTOMY DISCECTOMY  DECOMPRESSION Bilateral 06/28/2021    Procedure: Lumbar laminectomy lumbar 3 lumbar 4 for bilateral decompression;  Surgeon: Bernabe Costello MD;  Location: [facility] MAIN OR;  Service: Neurosurgery;  Laterality: Bilateral;    SPINE SURGERY  6/28/21    laminectomy    WISDOM TOOTH EXTRACTION         Family History   Problem Relation Age of Onset    Arthritis Mother     Mental illness Mother     Stroke Father     Alcohol abuse Father     No Known Problems Sister     No Known Problems Brother     No Known Problems Brother     No Known Problems Brother     Malig Hyperthermia Neg Hx        Social History     Socioeconomic History    Marital status:    Tobacco Use    Smoking status: Never    Smokeless tobacco: Never   Vaping Use    Vaping status: Never Used   Substance and Sexual Activity    Alcohol use: Not Currently     Comment: very seldom    Drug use: Never    Sexual activity: Yes     Partners: Female     Birth control/protection: None           Objective   Physical Exam  Constitutional:       Appearance: Normal appearance.   HENT:      Head: Normocephalic and atraumatic.      Mouth/Throat:      Mouth: Mucous membranes are moist.      Pharynx: Oropharynx is clear.   Eyes:      Conjunctiva/sclera: Conjunctivae normal.   Cardiovascular:      Rate and Rhythm: Normal rate and regular rhythm.   Pulmonary:      Effort: Pulmonary effort is normal.      Breath sounds: Normal breath sounds.   Abdominal:      General: Abdomen is flat.   Musculoskeletal:      Cervical back: Neck supple.   Skin:     General: Skin is warm and dry.   Neurological:      Mental Status: He is alert.   Psychiatric:         Mood and Affect: Mood normal.         Procedures           ED Course  ED Course as of 03/15/25 0533   Sat Mar 15, 2025   0436 Patient is a 59-year-old male presents with rhinorrhea congestion body aches for the past 1 to 2 days.  His daughter and son all have the same symptoms, had a fever at home with Tmax of one 1.6.   He took ibuprofen, resolution of fever currently afebrile at 98.6 Fahrenheit here.  No chest pain shortness of breath nausea vomiting currently has no other new complaints currently.  States that the bodyaches improved with ibuprofen.  Would like to be swabbed for COVID flu RSV will swab patient and monitor. [JF]   0520 Patient started to have some mild nausea, given Zofran. [JF]   0533 COVID-19 positive, discussed patient plan for discharge with PCP follow-up prescription for Zofran and cough suppressant sent to patient's pharmacy.  Patient understands and agrees.  All questions answered. [JF]      ED Course User Index  [JF] Huber Cheng MD                                                       Medical Decision Making  My differential diagnosis for fever includes but is not limited:  To viral infections including COVID-19, bacterial infections, fungal infections, fever of unknown origin, auto regulatory dysfunction, hyperthermia, heat exhaustion, heat stroke, malignant neuroleptic syndrome and others.      Problems Addressed:  COVID-19: complicated acute illness or injury    Amount and/or Complexity of Data Reviewed  Labs: ordered. Decision-making details documented in ED Course.    Risk  Prescription drug management.        Final diagnoses:   COVID-19       ED Disposition  ED Disposition       ED Disposition   Discharge    Condition   Stable    Comment   --               Johnathan Riggins MD  [address] [id number]  [phone]    Schedule an appointment as soon as possible for a visit in 2 days  For re-evaluation         Medication List        New Prescriptions      brompheniramine-pseudoephedrine-DM 30-2-10 MG/5ML syrup  Take 5 mL by mouth 4 (Four) Times a Day As Needed for Congestion or Cough for up to 7 days.     ondansetron ODT 4 MG disintegrating tablet  Commonly known as: ZOFRAN-ODT  Place 1 tablet on the tongue Every 8 (Eight) Hours As Needed for Vomiting or Nausea for up to 5 days.                Where to Get Your Medications        These medications were sent to [id number] - [place], [place] - [address] - [phone]  - [phone] [facility] [facility] [address]      Phone: [phone]   brompheniramine-pseudoephedrine-DM 30-2-10 MG/5ML syrup  ondansetron ODT 4 MG disintegrating tablet            Huber Cheng MD  03/15/25 4041

## 2025-03-20 DIAGNOSIS — I10 ESSENTIAL HYPERTENSION: ICD-10-CM

## 2025-03-20 RX ORDER — LOSARTAN POTASSIUM AND HYDROCHLOROTHIAZIDE 25; 100 MG/1; MG/1
1 TABLET ORAL DAILY
Qty: 90 TABLET | Refills: 0 | Status: SHIPPED | OUTPATIENT
Start: 2025-03-20

## 2025-03-28 ENCOUNTER — OFFICE VISIT (OUTPATIENT)
Dept: INTERNAL MEDICINE | Facility: CLINIC | Age: 60
End: 2025-03-28
Payer: COMMERCIAL

## 2025-03-28 VITALS
BODY MASS INDEX: 34.27 KG/M2 | SYSTOLIC BLOOD PRESSURE: 128 MMHG | HEART RATE: 105 BPM | HEIGHT: 73 IN | DIASTOLIC BLOOD PRESSURE: 74 MMHG | RESPIRATION RATE: 16 BRPM | OXYGEN SATURATION: 95 % | WEIGHT: 258.6 LBS

## 2025-03-28 DIAGNOSIS — G47.33 OSA ON CPAP: Primary | ICD-10-CM

## 2025-03-28 PROCEDURE — 1125F AMNT PAIN NOTED PAIN PRSNT: CPT | Performed by: INTERNAL MEDICINE

## 2025-03-28 PROCEDURE — 99213 OFFICE O/P EST LOW 20 MIN: CPT | Performed by: INTERNAL MEDICINE

## 2025-03-28 NOTE — PROGRESS NOTES
"Chief Complaint  Follow-up (Needs new Cpap prescription )    Subjective        Wei Salgado presents to [facility]  History of Present Illness    Here for new CPAP script   Wearing every night for sleep apnea  Feeling much better with using   Moderate SIVAN, wearing every night  Needs new script sent to [facility]    Objective   Vital Signs:  /74   Pulse 105   Resp 16   Ht 185.4 cm (73\")   Wt 117 kg (258 lb 9.6 oz)   SpO2 95%   BMI 34.12 kg/m²   Estimated body mass index is 34.12 kg/m² as calculated from the following:    Height as of this encounter: 185.4 cm (73\").    Weight as of this encounter: 117 kg (258 lb 9.6 oz).        Physical Exam  Vitals and nursing note reviewed.   Constitutional:       General: He is not in acute distress.     Appearance: Normal appearance. He is not toxic-appearing.   HENT:      Head: Normocephalic and atraumatic.      Right Ear: External ear normal.      Left Ear: External ear normal.      Nose: Nose normal.   Eyes:      General: No scleral icterus.        Right eye: No discharge.         Left eye: No discharge.      Extraocular Movements: Extraocular movements intact.      Conjunctiva/sclera: Conjunctivae normal.      Pupils: Pupils are equal, round, and reactive to light.   Cardiovascular:      Rate and Rhythm: Normal rate and regular rhythm.      Pulses: Normal pulses.      Heart sounds: Normal heart sounds. No murmur heard.  Pulmonary:      Effort: Pulmonary effort is normal.   Musculoskeletal:         General: Normal range of motion.   Skin:     General: Skin is warm.      Capillary Refill: Capillary refill takes less than 2 seconds.   Neurological:      General: No focal deficit present.      Mental Status: He is alert and oriented to person, place, and time. Mental status is at baseline.      Cranial Nerves: No cranial nerve deficit.      Gait: Gait normal.   Psychiatric:         Mood and Affect: Mood normal.    "      Behavior: Behavior normal.         Thought Content: Thought content normal.         Judgment: Judgment normal.        Result Review :  The following data was reviewed by: Johnathan Riggins MD on 03/28/2025:  Common labs          5/15/2024    08:26   Common Labs   Glucose 102    BUN 24    Creatinine 0.88    Sodium 142    Potassium 4.3    Chloride 103    Calcium 9.6    Albumin 4.6    Total Bilirubin 0.5    Alkaline Phosphatase 78    AST (SGOT) 33    ALT (SGPT) 31    Total Cholesterol 204    Triglycerides 101    HDL Cholesterol 38    LDL Cholesterol  148    Hemoglobin A1C 5.30      Data reviewed : prior office notes reviewed           Assessment and Plan   Diagnoses and all orders for this visit:    1. SIVAN on CPAP (Primary)  -     PAP Therapy  Longstanding therapy, needs new machine, script sent over to [facility]   He is compliant with therapy and has noted significant improvement in symptoms       F/u 6 months    Johnathan Riggins MD  [facility]  [address] [place] [address]  Phone: [phone]

## 2025-04-08 RX ORDER — FLUTICASONE PROPIONATE 50 MCG
2 SPRAY, SUSPENSION (ML) NASAL DAILY
Qty: 16 G | Refills: 5 | Status: SHIPPED | OUTPATIENT
Start: 2025-04-08

## 2025-05-05 DIAGNOSIS — J30.1 SEASONAL ALLERGIC RHINITIS DUE TO POLLEN: ICD-10-CM

## 2025-05-05 RX ORDER — LEVOCETIRIZINE DIHYDROCHLORIDE 5 MG/1
5 TABLET, FILM COATED ORAL EVERY EVENING
Qty: 30 TABLET | Refills: 0 | Status: SHIPPED | OUTPATIENT
Start: 2025-05-05

## 2025-05-29 DIAGNOSIS — J30.1 SEASONAL ALLERGIC RHINITIS DUE TO POLLEN: ICD-10-CM

## 2025-05-29 RX ORDER — LEVOCETIRIZINE DIHYDROCHLORIDE 5 MG/1
5 TABLET, FILM COATED ORAL EVERY EVENING
Qty: 30 TABLET | Refills: 0 | Status: SHIPPED | OUTPATIENT
Start: 2025-05-29

## 2025-05-29 RX ORDER — FLUTICASONE PROPIONATE 50 MCG
2 SPRAY, SUSPENSION (ML) NASAL DAILY
Qty: 16 G | Refills: 5 | Status: SHIPPED | OUTPATIENT
Start: 2025-05-29

## 2025-05-29 NOTE — TELEPHONE ENCOUNTER
Rx Refill Note  Requested Prescriptions     Pending Prescriptions Disp Refills    fluticasone (FLONASE) 50 MCG/ACT nasal spray 16 g 5     Sig: Administer 2 sprays into the nostril(s) as directed by provider Daily.      Last office visit with prescribing clinician: 3/28/2025   Last telemedicine visit with prescribing clinician: Visit date not found   Next office visit with prescribing clinician: Visit date not found                         Would you like a call back once the refill request has been completed: [] Yes [] No    If the office needs to give you a call back, can they leave a voicemail: [] Yes [] No    Jayne Mckenzie MA  05/29/25, 07:30 EDT

## 2025-06-13 DIAGNOSIS — I10 ESSENTIAL HYPERTENSION: ICD-10-CM

## 2025-06-13 RX ORDER — LOSARTAN POTASSIUM AND HYDROCHLOROTHIAZIDE 25; 100 MG/1; MG/1
1 TABLET ORAL DAILY
Qty: 90 TABLET | Refills: 0 | Status: SHIPPED | OUTPATIENT
Start: 2025-06-13

## 2025-06-13 NOTE — TELEPHONE ENCOUNTER
Rx Refill Note  Requested Prescriptions     Pending Prescriptions Disp Refills    losartan-hydrochlorothiazide (HYZAAR) 100-25 MG per tablet [Pharmacy Med Name: Losartan Potassium-HCTZ 100-25 MG Oral Tablet] 90 tablet 0     Sig: Take 1 tablet by mouth once daily      Last office visit with prescribing clinician: 3/28/2025   Last telemedicine visit with prescribing clinician: Visit date not found   Next office visit with prescribing clinician: Visit date not found                         Would you like a call back once the refill request has been completed: [] Yes [] No    If the office needs to give you a call back, can they leave a voicemail: [] Yes [] No    Jayne Mckenzie MA  06/13/25, 10:59 EDT

## 2025-06-26 DIAGNOSIS — J30.1 SEASONAL ALLERGIC RHINITIS DUE TO POLLEN: ICD-10-CM

## 2025-06-26 RX ORDER — LEVOCETIRIZINE DIHYDROCHLORIDE 5 MG/1
5 TABLET, FILM COATED ORAL EVERY EVENING
Qty: 30 TABLET | Refills: 0 | Status: SHIPPED | OUTPATIENT
Start: 2025-06-26

## 2025-07-02 ENCOUNTER — OFFICE VISIT (OUTPATIENT)
Dept: INTERNAL MEDICINE | Facility: CLINIC | Age: 60
End: 2025-07-02
Payer: COMMERCIAL

## 2025-07-02 VITALS
BODY MASS INDEX: 35.57 KG/M2 | HEIGHT: 73 IN | SYSTOLIC BLOOD PRESSURE: 124 MMHG | DIASTOLIC BLOOD PRESSURE: 68 MMHG | RESPIRATION RATE: 16 BRPM | OXYGEN SATURATION: 98 % | HEART RATE: 67 BPM | WEIGHT: 268.4 LBS

## 2025-07-02 DIAGNOSIS — A04.8 H. PYLORI INFECTION: Primary | ICD-10-CM

## 2025-07-02 PROCEDURE — 1159F MED LIST DOCD IN RCRD: CPT | Performed by: INTERNAL MEDICINE

## 2025-07-02 PROCEDURE — 1160F RVW MEDS BY RX/DR IN RCRD: CPT | Performed by: INTERNAL MEDICINE

## 2025-07-02 PROCEDURE — 99213 OFFICE O/P EST LOW 20 MIN: CPT | Performed by: INTERNAL MEDICINE

## 2025-07-02 PROCEDURE — 1125F AMNT PAIN NOTED PAIN PRSNT: CPT | Performed by: INTERNAL MEDICINE

## 2025-07-02 NOTE — PROGRESS NOTES
"Chief Complaint  Follow-up (Exposed to H-Pylori)    Subjective        Wei Salgado presents to [facility]  History of Present Illness  Here for follow up   Wife did have reported hpylori and we discussed checking him and his son to ensure they also did not have  He is asymptomatic, no GI issues, no GERD, no significant reflux     Objective   Vital Signs:  /68   Pulse 67   Resp 16   Ht 185.4 cm (73\")   Wt 122 kg (268 lb 6.4 oz)   SpO2 98%   BMI 35.41 kg/m²   Estimated body mass index is 35.41 kg/m² as calculated from the following:    Height as of this encounter: 185.4 cm (73\").    Weight as of this encounter: 122 kg (268 lb 6.4 oz).    Physical Exam  Vitals and nursing note reviewed.   Constitutional:       General: He is not in acute distress.     Appearance: Normal appearance. He is not toxic-appearing.   HENT:      Head: Normocephalic and atraumatic.      Right Ear: External ear normal.      Left Ear: External ear normal.      Nose: Nose normal.   Eyes:      General: No scleral icterus.        Right eye: No discharge.         Left eye: No discharge.      Extraocular Movements: Extraocular movements intact.      Conjunctiva/sclera: Conjunctivae normal.      Pupils: Pupils are equal, round, and reactive to light.   Cardiovascular:      Rate and Rhythm: Normal rate and regular rhythm.      Pulses: Normal pulses.      Heart sounds: Normal heart sounds. No murmur heard.  Pulmonary:      Effort: Pulmonary effort is normal.   Musculoskeletal:         General: Normal range of motion.   Skin:     General: Skin is warm.      Capillary Refill: Capillary refill takes less than 2 seconds.   Neurological:      General: No focal deficit present.      Mental Status: He is alert and oriented to person, place, and time. Mental status is at baseline.      Cranial Nerves: No cranial nerve deficit.      Gait: Gait normal.   Psychiatric:         Mood and Affect: Mood " normal.         Behavior: Behavior normal.         Thought Content: Thought content normal.         Judgment: Judgment normal.        Result Review :  The following data was reviewed by: Johnathan Riggins MD on 07/02/2025:    Data reviewed : prior office notes reviewed          Assessment and Plan   Diagnoses and all orders for this visit:    1. H. pylori infection (Primary)  -     H. Pylori Breath Test - Breath, Lung    Discussed testing and potential treatment options if positive, return precautions discussed.     Johnathan Riggins MD  [facility]  [address] [place] [address]  Phone: [phone]

## 2025-07-03 LAB — UREA BREATH TEST QL: NEGATIVE

## 2025-07-20 DIAGNOSIS — J30.1 SEASONAL ALLERGIC RHINITIS DUE TO POLLEN: ICD-10-CM

## 2025-07-21 ENCOUNTER — HOSPITAL ENCOUNTER (EMERGENCY)
Facility: HOSPITAL | Age: 60
Discharge: HOME | End: 2025-07-21
Attending: EMERGENCY MEDICINE | Admitting: EMERGENCY MEDICINE
Payer: COMMERCIAL

## 2025-07-21 ENCOUNTER — APPOINTMENT (OUTPATIENT)
Dept: GENERAL RADIOLOGY | Facility: HOSPITAL | Age: 60
End: 2025-07-21
Payer: COMMERCIAL

## 2025-07-21 VITALS
RESPIRATION RATE: 18 BRPM | TEMPERATURE: 98.4 F | WEIGHT: 270 LBS | HEIGHT: 73 IN | SYSTOLIC BLOOD PRESSURE: 134 MMHG | HEART RATE: 76 BPM | DIASTOLIC BLOOD PRESSURE: 90 MMHG | OXYGEN SATURATION: 98 % | BODY MASS INDEX: 35.78 KG/M2

## 2025-07-21 DIAGNOSIS — M54.50 MIDLINE LOW BACK PAIN WITHOUT SCIATICA, UNSPECIFIED CHRONICITY: Primary | ICD-10-CM

## 2025-07-21 PROCEDURE — 72100 X-RAY EXAM L-S SPINE 2/3 VWS: CPT

## 2025-07-21 PROCEDURE — 63710000001 PREDNISONE PER 1 MG: Performed by: EMERGENCY MEDICINE

## 2025-07-21 PROCEDURE — 99283 EMERGENCY DEPT VISIT LOW MDM: CPT | Performed by: EMERGENCY MEDICINE

## 2025-07-21 RX ORDER — LEVOCETIRIZINE DIHYDROCHLORIDE 5 MG/1
5 TABLET, FILM COATED ORAL EVERY EVENING
Qty: 30 TABLET | Refills: 0 | Status: SHIPPED | OUTPATIENT
Start: 2025-07-21

## 2025-07-21 RX ORDER — FLUTICASONE PROPIONATE 50 MCG
2 SPRAY, SUSPENSION (ML) NASAL DAILY
Qty: 16 G | Refills: 5 | Status: SHIPPED | OUTPATIENT
Start: 2025-07-21

## 2025-07-21 RX ORDER — IBUPROFEN 400 MG/1
800 TABLET, FILM COATED ORAL ONCE
Status: COMPLETED | OUTPATIENT
Start: 2025-07-21 | End: 2025-07-21

## 2025-07-21 RX ORDER — METHYLPREDNISOLONE 4 MG/1
TABLET ORAL
Qty: 21 TABLET | Refills: 0 | Status: SHIPPED | OUTPATIENT
Start: 2025-07-21 | End: 2025-07-28

## 2025-07-21 RX ORDER — CYCLOBENZAPRINE HCL 10 MG
10 TABLET ORAL 3 TIMES DAILY PRN
Qty: 30 TABLET | Refills: 0 | Status: SHIPPED | OUTPATIENT
Start: 2025-07-21

## 2025-07-21 RX ORDER — PREDNISONE 20 MG/1
60 TABLET ORAL ONCE
Status: COMPLETED | OUTPATIENT
Start: 2025-07-21 | End: 2025-07-21

## 2025-07-21 RX ADMIN — PREDNISONE 60 MG: 20 TABLET ORAL at 21:20

## 2025-07-21 RX ADMIN — IBUPROFEN 800 MG: 400 TABLET ORAL at 21:20

## 2025-07-21 NOTE — ED PROVIDER NOTES
Subjective   History of Present Illness    Chief complaint: Back pain    Location: Lower midline    Quality/Severity: Moderate    Timing/Onset/Duration: Intermittent for the last couple months    Modifying Factors: Worse after playing golf    Associated Symptoms: No numbness, tingling, weakness in the legs related to neuropathy, no worse than usual today.  No change in bladder or bowel function.  No fever or chills.    Narrative: This 60-year-old white male presents with low back pain over the last 2 months.  Patient states the pain increased today.  Patient has a history of back surgery to his lower back.  Patient has a history of spinal stenosis.  He is unsure whether he has surgical hardware in his back or not.  The patient does not have a ride.    PCP:Johnathan Riggins MD    Spine surgeon:       Review of Systems   Constitutional:  Negative for chills and fever.   Musculoskeletal:  Positive for back pain.   Skin:  Negative for rash.   Neurological:  Positive for weakness (Chronic due to neuropathy). Negative for numbness.       Past Medical History:   Diagnosis Date    Allergic     Arthritis     Headache     HL (hearing loss) 3 years ago maybe, not for sure.    Hyperlipidemia     Hypertension     Kidney stone 2021    Low back pain 2020    Neuromuscular disorder 08/2020    Neuropathy    Sleep apnea     USES CPAP        No Known Allergies    Past Surgical History:   Procedure Laterality Date    BACK SURGERY      COLONOSCOPY N/A 11/20/2020    Procedure: COLONOSCOPY INTO CECUM;  Surgeon: Jesse Frey MD;  Location: [facility] ENDOSCOPY;  Service: Gastroenterology;  Laterality: N/A;  PRE: SCREENING  POST: HEMORRHOIDS    CYSTOSCOPY URETEROSCOPY LASER LITHOTRIPSY Left 09/27/2021    Procedure: LEFT URETEROSCOPY LASER LITHOTRIPSY, STONE BASKET, POSSIBLE STENT;  Surgeon: Jethro Valentin MD;  Location: [facility] OR;  Service: Urology;  Laterality: Left;    HAND SURGERY  1977    LUMBAR EPIDURAL INJECTION N/A  02/17/2022    Procedure: lumbar epidural steroid injection lumbar4-5 or lumbar5-sacral1;  Surgeon: Mitch Acosta MD;  [place] [facility] [place];  Service: Pain Management;  Laterality: N/A;    LUMBAR EPIDURAL INJECTION N/A 03/29/2022    Procedure: lumbar epidural steroid injection lumbar5-sacral1;  Surgeon: Mitch Acosta MD;  Location: [facility] MA[facility] OR;  Service: Pain Management;  Laterality: N/A;    LUMBAR LAMINECTOMY DISCECTOMY DECOMPRESSION Bilateral 06/28/2021    Procedure: Lumbar laminectomy lumbar 3 lumbar 4 for bilateral decompression;  Surgeon: Bernabe Costello MD;  Location: [facility] MAIN OR;  Service: Neurosurgery;  Laterality: Bilateral;    SPINE SURGERY  6/28/21    laminectomy    WISDOM TOOTH EXTRACTION         Family History   Problem Relation Age of Onset    Arthritis Mother     Mental illness Mother     Stroke Father     Alcohol abuse Father     No Known Problems Sister     No Known Problems Brother     No Known Problems Brother     No Known Problems Brother     Malig Hyperthermia Neg Hx        Social History     Socioeconomic History    Marital status:    Tobacco Use    Smoking status: Never    Smokeless tobacco: Never   Vaping Use    Vaping status: Never Used   Substance and Sexual Activity    Alcohol use: Not Currently     Comment: very seldom    Drug use: Never    Sexual activity: Yes     Partners: Female     Birth control/protection: None           Objective   Physical Exam  Vitals (Temperature is 8.4 °F, pulse 76, respirations 18, /90, room pulse ox 98%.) reviewed.   Constitutional:       Appearance: Normal appearance.   Musculoskeletal:      Comments: There is no midline bony tenderness or deformity.  There is no bony step-offs.  No CVA tenderness.   Neurological:      Mental Status: He is alert.      Sensory: Sensory deficit present.      Motor: No weakness.         Procedures           ED Course      21:12 EDT, 07/21/25:  Patient was reassessed.  He has no new  complaints.  His vital signs reviewed and are stable.  Neurological exam: Const alert and oriented x 4 with no focal deficits noted.    21:12 EDT, 07/21/25:  Patient was given 800 mg of Motrin and 60 mg of prednisone p.o.  The patient will have a prescription for Medrol Dosepak and Flexeril sent to his preferred pharmacy.  Patient should rest.  The patient should ice the areas that are sore for 20 minutes every 2-3 hours while awake for 2 to 3 days, and then apply moist heat for 20 minutes every 2-3 hours while awake for 2 to 3 days.  The patient should follow-up with Dr. Riggins in 1 week.  He should return to the emergency department there is worsening pain, numbness, tingling, weakness, change in bladder or bowel function, worse in any way at all.  The patient questions were answered he will be discharged in good condition.                                                 Medical Decision Making      Final diagnoses:   Midline low back pain without sciatica, unspecified chronicity       ED Disposition  ED Disposition       None            No follow-up provider specified.       Medication List      No changes were made to your prescriptions during this visit.       No orders to display     Labs Reviewed - No data to display  No results found.    Final diagnoses:   None         ED Medications:  Medications - No data to display    New Medications:     Medication List        ASK your doctor about these medications      azelastine 0.1 % nasal spray  Commonly known as: ASTELIN  Administer 2 sprays into the nostril(s) as directed by provider 2 (Two) Times a Day. Use in each nostril as directed     Armen Back & Body 500-32.5 MG tablet  Generic drug: Aspirin-Caffeine     fluticasone 50 MCG/ACT nasal spray  Commonly known as: FLONASE  Administer 2 sprays into the nostril(s) as directed by provider Daily.     levocetirizine 5 MG tablet  Commonly known as: XYZAL  Take 1 tablet by mouth Every Evening.      losartan-hydrochlorothiazide 100-25 MG per tablet  Commonly known as: HYZAAR  Take 1 tablet by mouth once daily     MAGNESIUM GLYCINATE PO     vitamin B-12 1000 MCG tablet  Commonly known as: CYANOCOBALAMIN  Take 1 tablet by mouth Daily.              Stopped Medications:     Medication List        ASK your doctor about these medications      azelastine 0.1 % nasal spray  Commonly known as: ASTELIN  Administer 2 sprays into the nostril(s) as directed by provider 2 (Two) Times a Day. Use in each nostril as directed     Armen Back & Body 500-32.5 MG tablet  Generic drug: Aspirin-Caffeine     fluticasone 50 MCG/ACT nasal spray  Commonly known as: FLONASE  Administer 2 sprays into the nostril(s) as directed by provider Daily.     levocetirizine 5 MG tablet  Commonly known as: XYZAL  Take 1 tablet by mouth Every Evening.     losartan-hydrochlorothiazide 100-25 MG per tablet  Commonly known as: HYZAAR  Take 1 tablet by mouth once daily     MAGNESIUM GLYCINATE PO     vitamin B-12 1000 MCG tablet  Commonly known as: CYANOCOBALAMIN  Take 1 tablet by mouth Daily.                   Desean Cruz MD  07/21/25 [id number]

## 2025-07-22 NOTE — DISCHARGE INSTRUCTIONS
Take the Medrol Dosepak as prescribed.  Take Flexeril as needed as prescribed.  Tylenol as needed as prescribed for pain.  Call Dr. Riggins in the morning for follow-up appointment within 1 week.  Return to the emergency department if there is increased pain, numbness, tingling, weakness, change in bladder or bowel function, worse in any way at all.

## 2025-07-23 ENCOUNTER — TELEPHONE (OUTPATIENT)
Dept: INTERNAL MEDICINE | Facility: CLINIC | Age: 60
End: 2025-07-23

## 2025-07-28 ENCOUNTER — OFFICE VISIT (OUTPATIENT)
Dept: INTERNAL MEDICINE | Facility: CLINIC | Age: 60
End: 2025-07-28
Payer: COMMERCIAL

## 2025-07-28 VITALS
DIASTOLIC BLOOD PRESSURE: 80 MMHG | SYSTOLIC BLOOD PRESSURE: 126 MMHG | OXYGEN SATURATION: 95 % | RESPIRATION RATE: 16 BRPM | WEIGHT: 270.6 LBS | HEIGHT: 73 IN | HEART RATE: 64 BPM | BODY MASS INDEX: 35.86 KG/M2

## 2025-07-28 DIAGNOSIS — M54.16 LUMBAR RADICULOPATHY: Primary | ICD-10-CM

## 2025-07-28 DIAGNOSIS — M54.50 LOW BACK PAIN, UNSPECIFIED BACK PAIN LATERALITY, UNSPECIFIED CHRONICITY, UNSPECIFIED WHETHER SCIATICA PRESENT: ICD-10-CM

## 2025-07-28 PROCEDURE — 1160F RVW MEDS BY RX/DR IN RCRD: CPT | Performed by: INTERNAL MEDICINE

## 2025-07-28 PROCEDURE — 1125F AMNT PAIN NOTED PAIN PRSNT: CPT | Performed by: INTERNAL MEDICINE

## 2025-07-28 PROCEDURE — 99214 OFFICE O/P EST MOD 30 MIN: CPT | Performed by: INTERNAL MEDICINE

## 2025-07-28 PROCEDURE — 1159F MED LIST DOCD IN RCRD: CPT | Performed by: INTERNAL MEDICINE

## 2025-07-28 NOTE — PROGRESS NOTES
Chief Complaint  Hospital Follow Up Visit (Went to  on 7/21/25 for Back Pain)    Mohit Salgado presents to [facility]  History of Present Illness    History of Present Illness  The patient presents for back pain.    He experienced severe back pain, which he initially attributed to kidney stones. The pain was intense, radiating through his hip and spine. He was prescribed a course of tapering steroids and muscle relaxers, which he completed on 07/27/2025. Although the pain has subsided significantly, he still experiences some discomfort. He is considering whether to continue with the muscle relaxers or only take them as needed. He is also contemplating physical therapy and an MRI of his spine, although he has a fear of enclosed spaces. He has been managing his back pain with over-the-counter medications such as Back and Body and ibuprofen for the past year. An x-ray of his spine showed no abnormalities. He underwent a laminectomy in the past, but it did not alleviate his symptoms. He now walks with a limp and has to be cautious while walking to avoid twisting his ankle.      PAST SURGICAL HISTORY:  Laminectomy    Results  Imaging   - X-ray of spine: No abnormalities      Current Outpatient Medications:     Aspirin-Caffeine (Armen Back & Body) 500-32.5 MG tablet, Take  by mouth., Disp: , Rfl:     azelastine (ASTELIN) 0.1 % nasal spray, Administer 2 sprays into the nostril(s) as directed by provider 2 (Two) Times a Day. Use in each nostril as directed, Disp: 30 mL, Rfl: 12    cyclobenzaprine (FLEXERIL) 10 MG tablet, Take 1 tablet by mouth 3 (Three) Times a Day As Needed for Muscle Spasms., Disp: 30 tablet, Rfl: 0    fluticasone (FLONASE) 50 MCG/ACT nasal spray, Administer 2 sprays into the nostril(s) as directed by provider Daily., Disp: 16 g, Rfl: 5    levocetirizine (XYZAL) 5 MG tablet, Take 1 tablet by mouth Every Evening., Disp: 30 tablet, Rfl:  0    losartan-hydrochlorothiazide (HYZAAR) 100-25 MG per tablet, Take 1 tablet by mouth once daily, Disp: 90 tablet, Rfl: 0    MAGNESIUM GLYCINATE PO, Take  by mouth., Disp: , Rfl:     vitamin B-12 (CYANOCOBALAMIN) 1000 MCG tablet, Take 1 tablet by mouth Daily., Disp: 30 tablet, Rfl: 5  Past Medical History:   Diagnosis Date    Allergic     Arthritis     Headache     HL (hearing loss) 3 years ago maybe, not for sure.    Hyperlipidemia     Hypertension     Kidney stone 2021    Low back pain 2020    Neuromuscular disorder 08/2020    Neuropathy    Sleep apnea     USES CPAP      Past Surgical History:   Procedure Laterality Date    BACK SURGERY      COLONOSCOPY N/A 11/20/2020    Procedure: COLONOSCOPY INTO CECUM;  Surgeon: Jesse Frey MD;  Location: [facility] ENDOSCOPY;  Service: Gastroenterology;  Laterality: N/A;  PRE: SCREENING  POST: HEMORRHOIDS    CYSTOSCOPY URETEROSCOPY LASER LITHOTRIPSY Left 09/27/2021    Procedure: LEFT URETEROSCOPY LASER LITHOTRIPSY, STONE BASKET, POSSIBLE STENT;  Surgeon: Jethro Valentin MD;  Location: [facility] OR;  Service: Urology;  Laterality: Left;    HAND SURGERY  1977    LUMBAR EPIDURAL INJECTION N/A 02/17/2022    Procedure: lumbar epidural steroid injection lumbar4-5 or lumbar5-sacral1;  Surgeon: Mitch Acosta MD;  [place] [facility] [place]  Service: Pain Management;  Laterality: N/A;    LUMBAR EPIDURAL INJECTION N/A 03/29/2022    Procedure: lumbar epidural steroid injection lumbar5-sacral1;  Surgeon: Mitch Acosta MD;  Location: [facility] OR;  Service: Pain Management;  Laterality: N/A;    LUMBAR LAMINECTOMY DISCECTOMY DECOMPRESSION Bilateral 06/28/2021    Procedure: Lumbar laminectomy lumbar 3 lumbar 4 for bilateral decompression;  Surgeon: Bernabe Costello MD;  Location: [facility] MAIN OR;  Service: Neurosurgery;  Laterality: Bilateral;    SPINE SURGERY  6/28/21    laminectomy    WISDOM TOOTH EXTRACTION       Family History   Problem Relation Age of Onset     "Arthritis Mother     Mental illness Mother     Stroke Father     Alcohol abuse Father     No Known Problems Sister     No Known Problems Brother     No Known Problems Brother     No Known Problems Brother     Malig Hyperthermia Neg Hx      Social History     Socioeconomic History    Marital status:    Tobacco Use    Smoking status: Never    Smokeless tobacco: Never   Vaping Use    Vaping status: Never Used   Substance and Sexual Activity    Alcohol use: Not Currently     Comment: very seldom    Drug use: Never    Sexual activity: Yes     Partners: Female     Birth control/protection: None        reviewed and updated    Objective   Vital Signs:  /80   Pulse 64   Resp 16   Ht 185.4 cm (73\")   Wt 123 kg (270 lb 9.6 oz)   SpO2 95%   BMI 35.70 kg/m²   Estimated body mass index is 35.7 kg/m² as calculated from the following:    Height as of this encounter: 185.4 cm (73\").    Weight as of this encounter: 123 kg (270 lb 9.6 oz).        Physical Exam  Vitals and nursing note reviewed.   Constitutional:       General: He is not in acute distress.     Appearance: Normal appearance. He is not toxic-appearing.   HENT:      Head: Normocephalic and atraumatic.      Right Ear: External ear normal.      Left Ear: External ear normal.      Nose: Nose normal.   Eyes:      General: No scleral icterus.        Right eye: No discharge.         Left eye: No discharge.      Extraocular Movements: Extraocular movements intact.      Conjunctiva/sclera: Conjunctivae normal.      Pupils: Pupils are equal, round, and reactive to light.   Cardiovascular:      Rate and Rhythm: Normal rate and regular rhythm.      Pulses: Normal pulses.      Heart sounds: Normal heart sounds. No murmur heard.  Pulmonary:      Effort: Pulmonary effort is normal.      Breath sounds: No wheezing or rales.   Musculoskeletal:         General: Normal range of motion.   Skin:     General: Skin is warm.      Capillary Refill: Capillary refill takes " less than 2 seconds.   Neurological:      General: No focal deficit present.      Mental Status: He is alert and oriented to person, place, and time. Mental status is at baseline.      Cranial Nerves: No cranial nerve deficit.      Gait: Gait normal.   Psychiatric:         Mood and Affect: Mood normal.         Behavior: Behavior normal.         Thought Content: Thought content normal.         Judgment: Judgment normal.          Physical Exam      Result Review :  The following data was reviewed by: Johnathan Riggins MD on 07/28/2025:    Data reviewed : prior office notes reviewed          Assessment and Plan   Diagnoses and all orders for this visit:    1. Lumbar radiculopathy (Primary)  -     MRI Lumbar Spine With & Without Contrast; Future    2. Low back pain, unspecified back pain laterality, unspecified chronicity, unspecified whether sciatica present  -     Ambulatory Referral to Physical Therapy for Evaluation & Treatment             Assessment & Plan  1. Back pain.  - Reports experiencing excruciating back pain radiating through the hip and spine area, which has improved significantly after a course of tapering steroids and muscle relaxers.  - Completed the last dose of steroids on 07/27/2025; currently experiencing slight discomfort.  - MRI of the spine will be ordered to further evaluate his condition; physical therapy is recommended to help with muscle cramping and improve overall back function.  - Advised to continue using muscle relaxers as needed and consider taking creatine supplements to potentially enhance muscle mass and reduce fatigue.       Patient or patient representative verbalized consent for the use of Ambient Listening during the visit with  Johnathan Riggins MD for chart documentation. 7/28/2025  13:03 EDT    Johnathan Riggins MD  [facility]

## 2025-08-07 ENCOUNTER — OFFICE VISIT (OUTPATIENT)
Dept: INTERNAL MEDICINE | Facility: CLINIC | Age: 60
End: 2025-08-07
Payer: COMMERCIAL

## 2025-08-07 ENCOUNTER — HOSPITAL ENCOUNTER (OUTPATIENT)
Dept: PHYSICAL THERAPY | Facility: HOSPITAL | Age: 60
Setting detail: THERAPIES SERIES
Discharge: HOME | End: 2025-08-07
Payer: COMMERCIAL

## 2025-08-07 VITALS
HEIGHT: 73 IN | OXYGEN SATURATION: 96 % | HEART RATE: 66 BPM | DIASTOLIC BLOOD PRESSURE: 78 MMHG | BODY MASS INDEX: 35.81 KG/M2 | WEIGHT: 270.2 LBS | RESPIRATION RATE: 16 BRPM | SYSTOLIC BLOOD PRESSURE: 128 MMHG

## 2025-08-07 DIAGNOSIS — G47.33 OSA ON CPAP: ICD-10-CM

## 2025-08-07 DIAGNOSIS — F40.240 CLAUSTROPHOBIA: Primary | ICD-10-CM

## 2025-08-07 DIAGNOSIS — M54.50 LOW BACK PAIN, UNSPECIFIED BACK PAIN LATERALITY, UNSPECIFIED CHRONICITY, UNSPECIFIED WHETHER SCIATICA PRESENT: Primary | ICD-10-CM

## 2025-08-07 PROCEDURE — 97161 PT EVAL LOW COMPLEX 20 MIN: CPT

## 2025-08-07 PROCEDURE — 99214 OFFICE O/P EST MOD 30 MIN: CPT | Performed by: INTERNAL MEDICINE

## 2025-08-07 PROCEDURE — 97110 THERAPEUTIC EXERCISES: CPT

## 2025-08-07 PROCEDURE — 1125F AMNT PAIN NOTED PAIN PRSNT: CPT | Performed by: INTERNAL MEDICINE

## 2025-08-07 RX ORDER — IBUPROFEN 200 MG
TABLET ORAL
COMMUNITY
Start: 2025-08-07

## 2025-08-07 RX ORDER — DIAZEPAM 5 MG/1
5 TABLET ORAL ONCE AS NEEDED
Qty: 2 TABLET | Refills: 0 | Status: SHIPPED | OUTPATIENT
Start: 2025-08-07

## 2025-08-12 ENCOUNTER — HOSPITAL ENCOUNTER (OUTPATIENT)
Dept: PHYSICAL THERAPY | Facility: HOSPITAL | Age: 60
Setting detail: THERAPIES SERIES
Discharge: HOME | End: 2025-08-12
Payer: COMMERCIAL

## 2025-08-12 DIAGNOSIS — M54.50 LOW BACK PAIN, UNSPECIFIED BACK PAIN LATERALITY, UNSPECIFIED CHRONICITY, UNSPECIFIED WHETHER SCIATICA PRESENT: Primary | ICD-10-CM

## 2025-08-12 PROCEDURE — 97140 MANUAL THERAPY 1/> REGIONS: CPT

## 2025-08-12 PROCEDURE — 97110 THERAPEUTIC EXERCISES: CPT

## 2025-08-12 PROCEDURE — G0283 ELEC STIM OTHER THAN WOUND: HCPCS

## 2025-08-19 ENCOUNTER — HOSPITAL ENCOUNTER (OUTPATIENT)
Dept: PHYSICAL THERAPY | Facility: HOSPITAL | Age: 60
Setting detail: THERAPIES SERIES
Discharge: HOME | End: 2025-08-19
Payer: COMMERCIAL

## 2025-08-19 DIAGNOSIS — M54.50 LOW BACK PAIN, UNSPECIFIED BACK PAIN LATERALITY, UNSPECIFIED CHRONICITY, UNSPECIFIED WHETHER SCIATICA PRESENT: Primary | ICD-10-CM

## 2025-08-19 PROCEDURE — 97110 THERAPEUTIC EXERCISES: CPT

## 2025-08-19 PROCEDURE — 97140 MANUAL THERAPY 1/> REGIONS: CPT

## 2025-08-19 PROCEDURE — G0283 ELEC STIM OTHER THAN WOUND: HCPCS

## 2025-08-20 RX ORDER — FLUTICASONE PROPIONATE 50 MCG
2 SPRAY, SUSPENSION (ML) NASAL DAILY
Qty: 16 G | Refills: 5 | Status: SHIPPED | OUTPATIENT
Start: 2025-08-20

## 2025-08-26 ENCOUNTER — HOSPITAL ENCOUNTER (OUTPATIENT)
Dept: PHYSICAL THERAPY | Facility: HOSPITAL | Age: 60
Setting detail: THERAPIES SERIES
Discharge: HOME | End: 2025-08-26
Payer: COMMERCIAL

## 2025-08-26 DIAGNOSIS — M54.50 LOW BACK PAIN, UNSPECIFIED BACK PAIN LATERALITY, UNSPECIFIED CHRONICITY, UNSPECIFIED WHETHER SCIATICA PRESENT: Primary | ICD-10-CM

## 2025-08-26 DIAGNOSIS — J30.1 SEASONAL ALLERGIC RHINITIS DUE TO POLLEN: ICD-10-CM

## 2025-08-26 PROCEDURE — 97110 THERAPEUTIC EXERCISES: CPT

## 2025-08-26 PROCEDURE — G0283 ELEC STIM OTHER THAN WOUND: HCPCS

## 2025-08-26 RX ORDER — LEVOCETIRIZINE DIHYDROCHLORIDE 5 MG/1
5 TABLET, FILM COATED ORAL EVERY EVENING
Qty: 30 TABLET | Refills: 0 | Status: SHIPPED | OUTPATIENT
Start: 2025-08-26

## (undated) DEVICE — SEAL PRT BIOP ADJ STRL

## (undated) DEVICE — TOOL MR8-14MH30 MR8 14CM MATCH 3MM: Brand: MIDAS REX MR8

## (undated) DEVICE — DRP MICROSCOPE 4 BINOCULAR CV 54X150IN

## (undated) DEVICE — SYR CONTRL LUERLOK 10CC

## (undated) DEVICE — PK NEURO SPINE 40

## (undated) DEVICE — GLV SURG SENSICARE W/ALOE PF LF 7.5 STRL

## (undated) DEVICE — SPNG GZ WOVN 4X4IN 12PLY 10/BX STRL

## (undated) DEVICE — LN SMPL CO2 SHTRM SD STREAM W/M LUER

## (undated) DEVICE — URETERAL STENT
Type: IMPLANTABLE DEVICE | Site: URETER | Status: NON-FUNCTIONAL
Brand: CONTOUR™
Removed: 2021-09-27

## (undated) DEVICE — GLV SURG SENSICARE PI LF PF 7.5 GRN STRL

## (undated) DEVICE — TOOL MR8-F3/9TA30 MR8 F3/9CM TAPER 3MM: Brand: MIDAS REX MR8

## (undated) DEVICE — EXTENSION SET, MALE LUER LOCK ADAPTER WITH RETRACTABLE COLLAR

## (undated) DEVICE — CANN O2 ETCO2 FITS ALL CONN CO2 SMPL A/ 7IN DISP LF

## (undated) DEVICE — SYRINGE, LUER LOCK, 60ML: Brand: MEDLINE

## (undated) DEVICE — SENSR O2 OXIMAX FNGR A/ 18IN NONSTR

## (undated) DEVICE — COTTON BALLS, DOUBLE STRUNG: Brand: DEROYAL

## (undated) DEVICE — NDL EPID TUOHY W/WINGS 20G 4.5IN

## (undated) DEVICE — TRANSPOSAL ULTRAFLEX DUO/QUAD ULTRA CART MANIFOLD

## (undated) DEVICE — SKIN PREP TRAY W/CHG: Brand: MEDLINE INDUSTRIES, INC.

## (undated) DEVICE — PENCL ES MEGADINE EZ/CLEAN BUTN W/HOLSTR 10FT

## (undated) DEVICE — ACCY PA700 LUBRICANT DIFFUSER MR7 4 PACK: Brand: MIDAS REX

## (undated) DEVICE — NEEDLE, QUINCKE 22GX3.5": Brand: MEDLINE INDUSTRIES, INC.

## (undated) DEVICE — FIBR LASR MH 1P/U 200

## (undated) DEVICE — GLV SURG TRIUMPH PF LTX 7.5 STRL

## (undated) DEVICE — ANTIBACTERIAL UNDYED BRAIDED (POLYGLACTIN 910), SYNTHETIC ABSORBABLE SUTURE: Brand: COATED VICRYL

## (undated) DEVICE — EPIDURAL TRAY: Brand: MEDLINE INDUSTRIES, INC.

## (undated) DEVICE — URETERAL DILATATION SYSTEM

## (undated) DEVICE — 3M™ STERI-STRIP™ ANTIMICROBIAL SKIN CLOSURES 1/2 INCH X 4 INCHES 50/CARTON 4 CARTONS/CASE A1847: Brand: 3M™ STERI-STRIP™

## (undated) DEVICE — APPL CHLORAPREP HI/LITE 26ML ORNG

## (undated) DEVICE — PATIENT RETURN ELECTRODE, SINGLE-USE, CONTACT QUALITY MONITORING, ADULT, WITH 9FT CORD, FOR PATIENTS WEIGING OVER 33LBS. (15KG): Brand: MEGADYNE

## (undated) DEVICE — NEEDLE, QUINCKE, 20GX3.5": Brand: MEDLINE

## (undated) DEVICE — FIBR LASR HOLMIUM SINGLEFLEX400 FLT/TP DISP

## (undated) DEVICE — SUT VIC UD BR COAT OS/4 0 27IN SLVR J694H

## (undated) DEVICE — Device: Brand: PORTEX

## (undated) DEVICE — THE TORRENT IRRIGATION SCOPE CONNECTOR IS USED WITH THE TORRENT IRRIGATION TUBING TO PROVIDE IRRIGATION FLUIDS SUCH AS STERILE WATER DURING GASTROINTESTINAL ENDOSCOPIC PROCEDURES WHEN USED IN CONJUNCTION WITH AN IRRIGATION PUMP (OR ELECTROSURGICAL UNIT).: Brand: TORRENT

## (undated) DEVICE — TUBING, SUCTION, 1/4" X 10', STRAIGHT: Brand: MEDLINE

## (undated) DEVICE — SUT VIC 3/0 X1 27IN J458H

## (undated) DEVICE — GLV SURG SIGNATURE ESSENTIAL PF LTX SZ7.5

## (undated) DEVICE — KT ORCA ORCAPOD DISP STRL

## (undated) DEVICE — PK CYSTO 90

## (undated) DEVICE — GLV SURG BIOGEL LTX PF 8

## (undated) DEVICE — CONN TBG Y 5 IN 1 LF STRL

## (undated) DEVICE — SMOKE EVACUATION TUBING WITH 7/8 IN TO 1/4 IN REDUCER: Brand: BUFFALO FILTER

## (undated) DEVICE — GLV SURG BIOGEL LTX PF 7 1/2

## (undated) DEVICE — SYS IRR PUMP SGL ACTN VAC SYR 10CC

## (undated) DEVICE — NDL SPINE 22G 5IN BLK

## (undated) DEVICE — NITINOL STONE RETRIEVAL BASKET: Brand: ZERO TIP

## (undated) DEVICE — NDL SPINE 22G 31/2IN BLK

## (undated) DEVICE — CONTAINER,SPECIMEN,OR STERILE,4OZ: Brand: MEDLINE

## (undated) DEVICE — ADAPT CLN BIOGUARD AIR/H2O DISP

## (undated) DEVICE — NITINOL WIRE WITH HYDROPHILIC TIP: Brand: SENSOR

## (undated) DEVICE — CATH URETRL FLXITP POLLACK STD 5F 70CM

## (undated) DEVICE — DISPOSABLE BIPOLAR CABLE 12FT. (3.6M): Brand: KIRWAN